# Patient Record
Sex: FEMALE | Race: WHITE | Employment: OTHER | URBAN - METROPOLITAN AREA
[De-identification: names, ages, dates, MRNs, and addresses within clinical notes are randomized per-mention and may not be internally consistent; named-entity substitution may affect disease eponyms.]

---

## 2022-05-18 ENCOUNTER — OFFICE VISIT (OUTPATIENT)
Dept: INTERNAL MEDICINE CLINIC | Facility: CLINIC | Age: 82
End: 2022-05-18
Payer: MEDICARE

## 2022-05-18 VITALS
HEART RATE: 58 BPM | SYSTOLIC BLOOD PRESSURE: 112 MMHG | BODY MASS INDEX: 32.1 KG/M2 | HEIGHT: 61 IN | WEIGHT: 170 LBS | TEMPERATURE: 97 F | DIASTOLIC BLOOD PRESSURE: 56 MMHG

## 2022-05-18 DIAGNOSIS — R01.1 MURMUR: ICD-10-CM

## 2022-05-18 DIAGNOSIS — I10 PRIMARY HYPERTENSION: Primary | ICD-10-CM

## 2022-05-18 DIAGNOSIS — Z12.31 ENCOUNTER FOR SCREENING MAMMOGRAM FOR MALIGNANT NEOPLASM OF BREAST: ICD-10-CM

## 2022-05-18 DIAGNOSIS — H93.13 TINNITUS OF BOTH EARS: ICD-10-CM

## 2022-05-18 DIAGNOSIS — E78.5 DYSLIPIDEMIA: ICD-10-CM

## 2022-05-18 DIAGNOSIS — H90.0 CONDUCTIVE HEARING LOSS, BILATERAL: ICD-10-CM

## 2022-05-18 DIAGNOSIS — F32.A ANXIETY AND DEPRESSION: ICD-10-CM

## 2022-05-18 DIAGNOSIS — F41.9 ANXIETY AND DEPRESSION: ICD-10-CM

## 2022-05-18 DIAGNOSIS — E04.1 THYROID NODULE: ICD-10-CM

## 2022-05-18 DIAGNOSIS — Z78.0 POSTMENOPAUSAL: ICD-10-CM

## 2022-05-18 DIAGNOSIS — G56.01 CARPAL TUNNEL SYNDROME OF RIGHT WRIST: ICD-10-CM

## 2022-05-18 DIAGNOSIS — M79.89 LEG SWELLING: ICD-10-CM

## 2022-05-18 DIAGNOSIS — Z87.898 HISTORY OF PALPITATIONS: ICD-10-CM

## 2022-05-18 PROCEDURE — 99204 OFFICE O/P NEW MOD 45 MIN: CPT | Performed by: NURSE PRACTITIONER

## 2022-05-18 RX ORDER — NADOLOL 40 MG/1
40 TABLET ORAL DAILY
COMMUNITY
Start: 2022-04-27

## 2022-05-18 RX ORDER — ALPRAZOLAM 0.5 MG/1
0.5 TABLET ORAL 3 TIMES DAILY PRN
COMMUNITY
Start: 2022-04-27 | End: 2022-05-18

## 2022-05-18 RX ORDER — SIMVASTATIN 20 MG
20 TABLET ORAL EVERY EVENING
COMMUNITY
Start: 2022-02-03

## 2022-05-18 RX ORDER — SERTRALINE HYDROCHLORIDE 100 MG/1
100 TABLET, FILM COATED ORAL DAILY
COMMUNITY
Start: 2022-02-04

## 2022-05-18 RX ORDER — ALPRAZOLAM 0.5 MG/1
0.5 TABLET ORAL 2 TIMES DAILY PRN
Refills: 0 | COMMUNITY
Start: 2022-05-18

## 2022-05-18 RX ORDER — FUROSEMIDE 40 MG/1
40 TABLET ORAL DAILY PRN
COMMUNITY
Start: 2022-03-01

## 2022-05-18 RX ORDER — AMLODIPINE BESYLATE AND BENAZEPRIL HYDROCHLORIDE 5; 10 MG/1; MG/1
1 CAPSULE ORAL DAILY
COMMUNITY
Start: 2022-02-04

## 2022-05-23 ENCOUNTER — LAB ENCOUNTER (OUTPATIENT)
Dept: LAB | Age: 82
End: 2022-05-23
Attending: NURSE PRACTITIONER
Payer: MEDICARE

## 2022-05-23 DIAGNOSIS — R73.9 HYPERGLYCEMIA: Primary | ICD-10-CM

## 2022-05-23 DIAGNOSIS — F41.9 ANXIETY AND DEPRESSION: ICD-10-CM

## 2022-05-23 DIAGNOSIS — R73.9 HYPERGLYCEMIA: ICD-10-CM

## 2022-05-23 DIAGNOSIS — E78.5 DYSLIPIDEMIA: ICD-10-CM

## 2022-05-23 DIAGNOSIS — I10 PRIMARY HYPERTENSION: ICD-10-CM

## 2022-05-23 DIAGNOSIS — F32.A ANXIETY AND DEPRESSION: ICD-10-CM

## 2022-05-23 LAB
ALBUMIN SERPL-MCNC: 3.5 G/DL (ref 3.4–5)
ALBUMIN/GLOB SERPL: 1 {RATIO} (ref 1–2)
ALP LIVER SERPL-CCNC: 64 U/L
ALT SERPL-CCNC: 23 U/L
ANION GAP SERPL CALC-SCNC: 7 MMOL/L (ref 0–18)
AST SERPL-CCNC: 17 U/L (ref 15–37)
BASOPHILS # BLD AUTO: 0.04 X10(3) UL (ref 0–0.2)
BASOPHILS NFR BLD AUTO: 0.6 %
BILIRUB SERPL-MCNC: 0.5 MG/DL (ref 0.1–2)
BUN BLD-MCNC: 12 MG/DL (ref 7–18)
CALCIUM BLD-MCNC: 8.9 MG/DL (ref 8.5–10.1)
CHLORIDE SERPL-SCNC: 107 MMOL/L (ref 98–112)
CHOLEST SERPL-MCNC: 155 MG/DL (ref ?–200)
CO2 SERPL-SCNC: 28 MMOL/L (ref 21–32)
CREAT BLD-MCNC: 0.81 MG/DL
EOSINOPHIL # BLD AUTO: 0.17 X10(3) UL (ref 0–0.7)
EOSINOPHIL NFR BLD AUTO: 2.7 %
ERYTHROCYTE [DISTWIDTH] IN BLOOD BY AUTOMATED COUNT: 13.2 %
EST. AVERAGE GLUCOSE BLD GHB EST-MCNC: 131 MG/DL (ref 68–126)
FASTING PATIENT LIPID ANSWER: YES
FASTING STATUS PATIENT QL REPORTED: YES
GLOBULIN PLAS-MCNC: 3.6 G/DL (ref 2.8–4.4)
GLUCOSE BLD-MCNC: 117 MG/DL (ref 70–99)
HBA1C MFR BLD: 6.2 % (ref ?–5.7)
HCT VFR BLD AUTO: 45.2 %
HDLC SERPL-MCNC: 57 MG/DL (ref 40–59)
HGB BLD-MCNC: 14.2 G/DL
IMM GRANULOCYTES # BLD AUTO: 0.02 X10(3) UL (ref 0–1)
IMM GRANULOCYTES NFR BLD: 0.3 %
LDLC SERPL CALC-MCNC: 79 MG/DL (ref ?–100)
LYMPHOCYTES # BLD AUTO: 1.42 X10(3) UL (ref 1–4)
LYMPHOCYTES NFR BLD AUTO: 22.6 %
MCH RBC QN AUTO: 28.6 PG (ref 26–34)
MCHC RBC AUTO-ENTMCNC: 31.4 G/DL (ref 31–37)
MCV RBC AUTO: 91.1 FL
MONOCYTES # BLD AUTO: 0.51 X10(3) UL (ref 0.1–1)
MONOCYTES NFR BLD AUTO: 8.1 %
NEUTROPHILS # BLD AUTO: 4.11 X10 (3) UL (ref 1.5–7.7)
NEUTROPHILS # BLD AUTO: 4.11 X10(3) UL (ref 1.5–7.7)
NEUTROPHILS NFR BLD AUTO: 65.7 %
NONHDLC SERPL-MCNC: 98 MG/DL (ref ?–130)
OSMOLALITY SERPL CALC.SUM OF ELEC: 295 MOSM/KG (ref 275–295)
PLATELET # BLD AUTO: 216 10(3)UL (ref 150–450)
POTASSIUM SERPL-SCNC: 3.9 MMOL/L (ref 3.5–5.1)
PROT SERPL-MCNC: 7.1 G/DL (ref 6.4–8.2)
RBC # BLD AUTO: 4.96 X10(6)UL
SODIUM SERPL-SCNC: 142 MMOL/L (ref 136–145)
TRIGL SERPL-MCNC: 106 MG/DL (ref 30–149)
TSI SER-ACNC: 1.56 MIU/ML (ref 0.36–3.74)
VLDLC SERPL CALC-MCNC: 17 MG/DL (ref 0–30)
WBC # BLD AUTO: 6.3 X10(3) UL (ref 4–11)

## 2022-05-23 PROCEDURE — 80061 LIPID PANEL: CPT

## 2022-05-23 PROCEDURE — 80053 COMPREHEN METABOLIC PANEL: CPT

## 2022-05-23 PROCEDURE — 85025 COMPLETE CBC W/AUTO DIFF WBC: CPT

## 2022-05-23 PROCEDURE — 84443 ASSAY THYROID STIM HORMONE: CPT

## 2022-05-23 PROCEDURE — 36415 COLL VENOUS BLD VENIPUNCTURE: CPT

## 2022-05-23 PROCEDURE — 83036 HEMOGLOBIN GLYCOSYLATED A1C: CPT

## 2022-05-25 ENCOUNTER — TELEPHONE (OUTPATIENT)
Dept: INTERNAL MEDICINE CLINIC | Facility: CLINIC | Age: 82
End: 2022-05-25

## 2022-05-25 DIAGNOSIS — R20.2 PARESTHESIAS: Primary | ICD-10-CM

## 2022-05-25 NOTE — TELEPHONE ENCOUNTER
Pts grand daughter Pilar Millan called (not on HIPPA but pt gave verbal OK to speak with her) and stated that pts Rt hand finger tips fell lie they are burning and its painful all the way to her elbow. Cant use hand to hold coffee or put hearing aid in. Pt was up all night due to the pain.      Took tylenol didn't help     Please advise

## 2022-05-25 NOTE — TELEPHONE ENCOUNTER
LOV 5/18/22    \"Carpal tunnel syndrome  Will try bracing. Would like to trial NSIDs but need labs first  She will have tomorrow am.\"    Reports pain to right forearm, fingertips burning/tingling, and  getting weaker. Brace not helping. Initial onset 1 month ago. Granddaughter states SD told pt to call if worse and we would give her a referral. They are requesting ortho. Aware will discuss with SD for recommendations and call back. Not interested in visit here.

## 2022-05-27 ENCOUNTER — OFFICE VISIT (OUTPATIENT)
Dept: ORTHOPEDICS CLINIC | Facility: CLINIC | Age: 82
End: 2022-05-27
Payer: MEDICARE

## 2022-05-27 VITALS — BODY MASS INDEX: 32.1 KG/M2 | HEIGHT: 61 IN | WEIGHT: 170 LBS

## 2022-05-27 DIAGNOSIS — G56.01 CARPAL TUNNEL SYNDROME OF RIGHT WRIST: Primary | ICD-10-CM

## 2022-05-27 PROCEDURE — 99203 OFFICE O/P NEW LOW 30 MIN: CPT | Performed by: ORTHOPAEDIC SURGERY

## 2022-06-01 NOTE — TELEPHONE ENCOUNTER
Which pharmacy:[facility]     Prescription Refill Request - Patient advised can take 48-72 hours. Name of Medication (strength, dose, qty requested:       sertraline 100 MG Oral Tab   2/4/2022     Sig - Route: Take 100 mg by mouth daily. - Oral    Class: Historical        amLODIPine Besy-Benazepril HCl 5-10 MG Oral Cap   2/4/2022     Sig - Route:  Take 1 capsule by mouth daily. - Oral

## 2022-06-02 RX ORDER — AMLODIPINE BESYLATE AND BENAZEPRIL HYDROCHLORIDE 5; 10 MG/1; MG/1
1 CAPSULE ORAL DAILY
Qty: 90 CAPSULE | Refills: 0 | Status: SHIPPED | OUTPATIENT
Start: 2022-06-02

## 2022-06-02 RX ORDER — SERTRALINE HYDROCHLORIDE 100 MG/1
100 TABLET, FILM COATED ORAL DAILY
Qty: 90 TABLET | Refills: 0 | Status: SHIPPED | OUTPATIENT
Start: 2022-06-02

## 2022-06-02 NOTE — TELEPHONE ENCOUNTER
Last VISIT 05/18/22     Last CPE Not on file    Last REFILL Not on file     Last LABS 05/23/22 CPE labs done    Future Appointments   Date Time Provider Cornelio Nelson                 6/16/2022 10:20 AM JENNIFER Seo [facility]         Per PROTOCOL? Not on protocol      Please Approve or Deny.

## 2022-06-08 ENCOUNTER — PROCEDURE VISIT (OUTPATIENT)
Dept: PHYSICAL MEDICINE AND REHAB | Facility: CLINIC | Age: 82
End: 2022-06-08
Payer: MEDICARE

## 2022-06-08 DIAGNOSIS — R20.2 NUMBNESS AND TINGLING IN RIGHT HAND: ICD-10-CM

## 2022-06-08 DIAGNOSIS — R20.0 NUMBNESS AND TINGLING IN RIGHT HAND: ICD-10-CM

## 2022-06-09 ENCOUNTER — OFFICE VISIT (OUTPATIENT)
Dept: ORTHOPEDICS CLINIC | Facility: CLINIC | Age: 82
End: 2022-06-09
Payer: MEDICARE

## 2022-06-09 ENCOUNTER — TELEPHONE (OUTPATIENT)
Dept: ORTHOPEDICS CLINIC | Facility: CLINIC | Age: 82
End: 2022-06-09

## 2022-06-09 VITALS — BODY MASS INDEX: 30.36 KG/M2 | WEIGHT: 165 LBS | HEIGHT: 62 IN

## 2022-06-09 DIAGNOSIS — G56.01 CARPAL TUNNEL SYNDROME ON RIGHT: Primary | ICD-10-CM

## 2022-06-09 DIAGNOSIS — G56.01 CARPAL TUNNEL SYNDROME OF RIGHT WRIST: Primary | ICD-10-CM

## 2022-06-09 PROCEDURE — 99214 OFFICE O/P EST MOD 30 MIN: CPT | Performed by: ORTHOPAEDIC SURGERY

## 2022-06-09 NOTE — TELEPHONE ENCOUNTER
Surgeon: Dr. Dany Hernandez    Date of Surgery: 6/17       Post Op Appt: 6/30 @ 1140AM    Prior Authorization:   Inpatient or Outpatient: Outpatient  Facility: [facility]  Work Comp: NO  CPT: 04646  DX: G56.01    Surgical Assistant: Regino Quinonez     Preadmission Testing: PER ANESTHESIA GUIDELINES     Pre-Op Clearance Requested: HISTORY AND PHYSICAL and NONE    DME:  NONE NEEDED    Rehab Services Ordered: NONE     Disability Paperwork: NONE    On [place] Calendar: YES    Notes: [facility]

## 2022-06-09 NOTE — PROGRESS NOTES
OR BOOKING SHEET   Nerve Decompression/Repair  Name: Estela Viera  MRN: [id number]   : 1940  Diagnosis:  [x] Carpal tunnel syndrome of right wrist [G56.01]    Disposition:    [x] Outpatient  [] Overnight for BLUE  [] Overnight for observation and pain control  [] Inpatient procedure    Operative Time:    [] 45 min     [x] 1 hr     []  1.5 hr     [] 2 hr     []  2.5 hr      [] 3 hr      Antibiotics:   [x]  Ancef   []  Clindamycin     Laterality:   [x] RIGHT [] LEFT        [] BILATERAL    Surgical Consent:  Right carpal tunnel release    Procedure Codes:  [] Endoscopic Carpal Tunnel Release (41965)    [] Cubital Tunnel Release (06438)    [x] Open Carpal Tunnel Release (47873)  [] Guyon's Canal Release (90351)  [] Digital Nerve Repair, one nerve (07964)  [] Digital Nerve Repair, additional nerve (72652)  [] Use of Operative Microscope (16973)    Anesthesia Type:  [] General  [] Regional  [] Brett Block  [] Monitored Anesthesia Care  [] Local Only (1% lidocaine, 0.5% marcaine - No Epi - 1:1 mix)  [x] Local Only (1% lidocaine WITH epi, 0.5% marcaine - 1:1 mix)    Additional info:   [] PCP Clearance Needed  [] Cardiac Clearance    Equipment:  []  Local (1% lidocaine, 0.5% marcaine - No Epi - 1:1 mix)  [] Local (1% lidocaine WITH epi, 0.5% marcaine - 1:1 mix)  []  Microaire Endoscopic System  [x]  Lead Hand  []  Sterile Tourniquet  [] Operative Microscope    Positioning:   []  Supine with arm table on OR Table  [x]  Supine with arm table on transport cart    Assistant request:   [x]  Yes - Chen Rice  []  No    Anesthesiologist Request:   None

## 2022-06-17 ENCOUNTER — HOSPITAL ENCOUNTER (OUTPATIENT)
Facility: HOSPITAL | Age: 82
Setting detail: HOSPITAL OUTPATIENT SURGERY
Discharge: HOME | End: 2022-06-17
Attending: ORTHOPAEDIC SURGERY | Admitting: ORTHOPAEDIC SURGERY
Payer: MEDICARE

## 2022-06-17 VITALS
SYSTOLIC BLOOD PRESSURE: 149 MMHG | OXYGEN SATURATION: 95 % | DIASTOLIC BLOOD PRESSURE: 67 MMHG | RESPIRATION RATE: 16 BRPM | HEIGHT: 62 IN | TEMPERATURE: 97 F | WEIGHT: 167.56 LBS | HEART RATE: 51 BPM | BODY MASS INDEX: 30.83 KG/M2

## 2022-06-17 DIAGNOSIS — G56.01 CARPAL TUNNEL SYNDROME ON RIGHT: ICD-10-CM

## 2022-06-17 PROCEDURE — 01N50ZZ RELEASE MEDIAN NERVE, OPEN APPROACH: ICD-10-PCS | Performed by: ORTHOPAEDIC SURGERY

## 2022-06-17 RX ORDER — CEFAZOLIN SODIUM/WATER 2 G/20 ML
2 SYRINGE (ML) INTRAVENOUS ONCE
Status: DISCONTINUED | OUTPATIENT
Start: 2022-06-17 | End: 2022-06-17 | Stop reason: HOSPADM

## 2022-06-17 RX ORDER — HYDROCODONE BITARTRATE AND ACETAMINOPHEN 5; 325 MG/1; MG/1
1 TABLET ORAL EVERY 6 HOURS PRN
Qty: 5 TABLET | Refills: 0 | Status: SHIPPED | OUTPATIENT
Start: 2022-06-17

## 2022-06-17 RX ORDER — LIDOCAINE HYDROCHLORIDE 10 MG/ML
INJECTION, SOLUTION INFILTRATION; PERINEURAL AS NEEDED
Status: DISCONTINUED | OUTPATIENT
Start: 2022-06-17 | End: 2022-06-17 | Stop reason: HOSPADM

## 2022-06-17 RX ORDER — BUPIVACAINE HYDROCHLORIDE 5 MG/ML
INJECTION, SOLUTION EPIDURAL; INTRACAUDAL AS NEEDED
Status: DISCONTINUED | OUTPATIENT
Start: 2022-06-17 | End: 2022-06-17 | Stop reason: HOSPADM

## 2022-06-17 NOTE — INTERVAL H&P NOTE
Pre-op Diagnosis: Carpal tunnel syndrome on right [G56.01]    The above referenced H&P was reviewed by CUCO Malave on 6/17/2022, the patient was examined and no significant changes have occurred in the patient's condition since the H&P was performed. I discussed with the patient and/or legal representative the potential benefits, risks and side effects of this procedure; the likelihood of the patient achieving goals; and potential problems that might occur during recuperation. I discussed reasonable alternatives to the procedure, including risks, benefits and side effects related to the alternatives and risks related to not receiving this procedure. We will proceed with procedure as planned.

## 2022-06-17 NOTE — OPERATIVE REPORT
Operative Note    Patient Name: Nohemy Last    Preoperative Diagnosis:     1. Carpal tunnel syndrome on right [G56.01]    Postoperative Diagnosis:     1. Carpal tunnel syndrome on right [G56.01]    Surgeon(s) and Role:     Marla Antoine MD - Primary     Assistant: PA: CUCO Norman     A PA was needed for the successful completion of this case. She was essential for the proper positioning of patient, manipulation of instruments, proper exposure, manipulation of soft tissue, and wound closure. Procedures:     1. Right carpal tunnel release (67987)    Antibiotics: Ancef 2g    Surgical Findings: thickened TCL    Anesthesia: Local    Complications: None    Implants: None    Specimen: None    Condition: Stable    Estimated Blood Loss: 1mL    Indications:  80year old female with EMG/NCV confirmed carpal tunnel syndrome. The risks associated with the procedure, expected  outcome, the expected time to recovery, and the rehab required were reviewed. Any patient's questions were answered. Procedure:  Patient was met in the preoperative holding area where consent was verified, laterality was marked with the surgeon's initials, and the H&P was updated. Patient was brought to the operating room on a transport cart. Patient was placed in supine position. SCDs were placed. An upper arm tourniquet was placed. The extremity was prepped and draped in usual sterile fashion. A surgical timeout was performed. Local (1% lidocaine with epi, 0.5% Marcaine) was infiltrated in the distal forearm and along the incision site of the carpal tunnel release. The limb was elevated and exsanguinated using Esmarch. Tourniquet was raised to 250 mmHg. A 15 blade was used to make an incision along the radial border of the ring finger with the distal extent being the hook of the hamate and the proximal extent being the pisiform. 15 blade was used to make an incision through the dermis.   A Ray-Catalina was used to perform blunt dissection onto the palmar fascia. Palmar fascia was incised longitudinally. Blunt dissection was once again used to identify the transverse carpal ligament which was then divided longitudinally along the entirety of its length. A tight carpal tunnel was noted. The soft tissue was dissected off the proximal edge of transverse carpal ligament and antebrachial fascia superficial and deep to it using RAFAL Rodríguez. Lance scissors was used to perform a push cut to release at. The wound was irrigated with sterile normal saline. Tourniquet was let down and bipolar cautery was used to achieve hemostasis. Closure: The incision was then closed with 4-0 nylon in a horizontal mattress fashion. Dressing/Splint:  Bacitracin, 4 x 4 gauze, and a loosely wrapped Ace wrap was used to hold the dressing in place. Post Operative:  Patient was woken up from anesthesia and taken to PACU for further recovery and discharge home. Nisreen Nogueira MD  Hand, Wrist, & Elbow Surgery  [facility]  [place] [address], [address]  [email or web address]  t: [id number]  f: [phone]

## 2022-06-30 ENCOUNTER — OFFICE VISIT (OUTPATIENT)
Dept: ORTHOPEDICS CLINIC | Facility: CLINIC | Age: 82
End: 2022-06-30
Payer: MEDICARE

## 2022-06-30 VITALS — BODY MASS INDEX: 30.73 KG/M2 | HEIGHT: 62 IN | WEIGHT: 167 LBS

## 2022-06-30 DIAGNOSIS — G56.01 CARPAL TUNNEL SYNDROME ON RIGHT: Primary | ICD-10-CM

## 2022-06-30 PROCEDURE — 99024 POSTOP FOLLOW-UP VISIT: CPT | Performed by: PHYSICIAN ASSISTANT

## 2022-07-15 RX ORDER — ALPRAZOLAM 0.5 MG/1
0.5 TABLET ORAL DAILY PRN
Qty: 30 TABLET | Refills: 0 | Status: SHIPPED | OUTPATIENT
Start: 2022-07-15

## 2022-07-15 NOTE — TELEPHONE ENCOUNTER
Last OV: 5/18/22 new pt est care    Future Appointments   Date Time Provider Cornelio Nelson   7/28/2022 10:15 AM [facility]   7/28/2022 10:45 AM Zan Sawant MD [facility]   8/4/2022 10:00 AM Clerance Dancer, MD [facility]        Latest labs: 5/23/22 a1C, TSH, Lipid, CMP and CBC    Latest RX: ALPRAZolam 0.5 MG Oral Tab no hx on historical reports    Per protocol, not on protocol. Rx pending.

## 2022-08-04 ENCOUNTER — OFFICE VISIT (OUTPATIENT)
Dept: INTERNAL MEDICINE CLINIC | Facility: CLINIC | Age: 82
End: 2022-08-04
Payer: MEDICARE

## 2022-08-04 VITALS
TEMPERATURE: 98 F | WEIGHT: 168 LBS | RESPIRATION RATE: 18 BRPM | HEIGHT: 61.81 IN | SYSTOLIC BLOOD PRESSURE: 126 MMHG | DIASTOLIC BLOOD PRESSURE: 74 MMHG | BODY MASS INDEX: 30.91 KG/M2 | HEART RATE: 68 BPM | OXYGEN SATURATION: 99 %

## 2022-08-04 DIAGNOSIS — M25.561 CHRONIC PAIN OF RIGHT KNEE: ICD-10-CM

## 2022-08-04 DIAGNOSIS — E04.1 THYROID NODULE: ICD-10-CM

## 2022-08-04 DIAGNOSIS — E78.5 DYSLIPIDEMIA: ICD-10-CM

## 2022-08-04 DIAGNOSIS — R01.1 MURMUR: ICD-10-CM

## 2022-08-04 DIAGNOSIS — G89.29 CHRONIC PAIN OF RIGHT KNEE: ICD-10-CM

## 2022-08-04 DIAGNOSIS — G56.01 CARPAL TUNNEL SYNDROME OF RIGHT WRIST: ICD-10-CM

## 2022-08-04 DIAGNOSIS — H90.0 CONDUCTIVE HEARING LOSS, BILATERAL: ICD-10-CM

## 2022-08-04 DIAGNOSIS — H93.13 TINNITUS OF BOTH EARS: ICD-10-CM

## 2022-08-04 DIAGNOSIS — I10 PRIMARY HYPERTENSION: ICD-10-CM

## 2022-08-04 DIAGNOSIS — Z00.00 ENCOUNTER FOR ANNUAL HEALTH EXAMINATION: Primary | ICD-10-CM

## 2022-08-04 DIAGNOSIS — R73.03 PREDIABETES: ICD-10-CM

## 2022-08-04 DIAGNOSIS — F32.A ANXIETY AND DEPRESSION: ICD-10-CM

## 2022-08-04 DIAGNOSIS — F41.9 ANXIETY AND DEPRESSION: ICD-10-CM

## 2022-08-04 PROCEDURE — G0438 PPPS, INITIAL VISIT: HCPCS | Performed by: INTERNAL MEDICINE

## 2022-08-04 PROCEDURE — 99214 OFFICE O/P EST MOD 30 MIN: CPT | Performed by: INTERNAL MEDICINE

## 2022-08-04 RX ORDER — ALPRAZOLAM 0.5 MG/1
0.5 TABLET ORAL 2 TIMES DAILY PRN
Qty: 60 TABLET | Refills: 0 | Status: SHIPPED | OUTPATIENT
Start: 2022-08-04

## 2022-08-05 ENCOUNTER — TELEPHONE (OUTPATIENT)
Dept: INTERNAL MEDICINE CLINIC | Facility: CLINIC | Age: 82
End: 2022-08-05

## 2022-08-05 NOTE — TELEPHONE ENCOUNTER
This was intentional. The last script sent was a temporary reduction. We are working to reduce this regimen.

## 2022-08-05 NOTE — TELEPHONE ENCOUNTER
Pt is calling to inform us pharmacy will not fill this medication until after the 15th of this month. Too soon to fill. Calling to see how we can help her. ALPRAZolam 0.5 MG Oral Tab 60 tablet 0 8/4/2022     Sig - Route:  Take 1 tablet (0.5 mg total) by mouth 2 (two) times daily as needed for Sleep or Anxiety. - Oral    Sent to pharmacy as: ALPRAZolam 0.5 MG Oral Tablet (Xanax)        Pt also stated she is no longer taking the Hydrocodone

## 2022-08-05 NOTE — TELEPHONE ENCOUNTER
CMA called pharmacy and discussed with tech. Per tech, they are in process of filling it and issue has been resolved on pharmacy end. Pt will get a call once available. CMA called and informed pt.

## 2022-08-08 ENCOUNTER — TELEPHONE (OUTPATIENT)
Dept: ORTHOPEDICS CLINIC | Facility: CLINIC | Age: 82
End: 2022-08-08

## 2022-08-08 DIAGNOSIS — M25.561 RIGHT KNEE PAIN, UNSPECIFIED CHRONICITY: ICD-10-CM

## 2022-08-08 DIAGNOSIS — M25.562 LEFT KNEE PAIN, UNSPECIFIED CHRONICITY: Primary | ICD-10-CM

## 2022-08-17 NOTE — TELEPHONE ENCOUNTER
Patient is coming in for Raman knee pain. Patient has not had any imaging done. Please place X-ray order accordingly. Patient has been notified to come in earlier prior to appointment. Thank you.     Future Appointments   Date Time Provider Cornelio Leslie   9/14/2022  4:00 PM Cody Saleh MD [facility]
Reviewed patients chart, xray orders are required. Order placed for buddy knee xrays.  Please contact patient advise to arrive 30 mins prior to patients appt to complete x-ray order and schedule patients xray appt-Thank you    Future Appointments   Date Time Provider Cornelio Leslie   9/14/2022  4:00 PM Deann Gardner MD [facility]
Simple: Patient demonstrates quick and easy understanding/Verbalized Understanding

## 2022-08-29 ENCOUNTER — WALK IN (OUTPATIENT)
Dept: URGENT CARE | Age: 82
End: 2022-08-29

## 2022-08-29 VITALS
SYSTOLIC BLOOD PRESSURE: 106 MMHG | WEIGHT: 170 LBS | RESPIRATION RATE: 20 BRPM | HEIGHT: 62 IN | TEMPERATURE: 97.7 F | OXYGEN SATURATION: 98 % | DIASTOLIC BLOOD PRESSURE: 70 MMHG | HEART RATE: 64 BPM | BODY MASS INDEX: 31.28 KG/M2

## 2022-08-29 DIAGNOSIS — J06.9 ACUTE UPPER RESPIRATORY INFECTION: Primary | ICD-10-CM

## 2022-08-29 DIAGNOSIS — R06.89 ADVENTITIOUS BREATH SOUNDS: ICD-10-CM

## 2022-08-29 DIAGNOSIS — H10.9 BACTERIAL CONJUNCTIVITIS: ICD-10-CM

## 2022-08-29 PROCEDURE — U0003 INFECTIOUS AGENT DETECTION BY NUCLEIC ACID (DNA OR RNA); SEVERE ACUTE RESPIRATORY SYNDROME CORONAVIRUS 2 (SARS-COV-2) (CORONAVIRUS DISEASE [COVID-19]), AMPLIFIED PROBE TECHNIQUE, MAKING USE OF HIGH THROUGHPUT TECHNOLOGIES AS DESCRIBED BY CMS-2020-01-R: HCPCS | Performed by: CLINICAL MEDICAL LABORATORY

## 2022-08-29 PROCEDURE — U0005 INFEC AGEN DETEC AMPLI PROBE: HCPCS | Performed by: CLINICAL MEDICAL LABORATORY

## 2022-08-29 PROCEDURE — 99203 OFFICE O/P NEW LOW 30 MIN: CPT | Performed by: NURSE PRACTITIONER

## 2022-08-29 RX ORDER — AMLODIPINE BESYLATE AND BENAZEPRIL HYDROCHLORIDE 5; 10 MG/1; MG/1
CAPSULE ORAL
COMMUNITY
Start: 2022-06-03

## 2022-08-29 RX ORDER — POLYMYXIN B SULFATE AND TRIMETHOPRIM 1; 10000 MG/ML; [USP'U]/ML
1 SOLUTION OPHTHALMIC EVERY 4 HOURS
Qty: 10 ML | Refills: 0 | Status: SHIPPED | OUTPATIENT
Start: 2022-08-29 | End: 2022-09-05

## 2022-08-29 RX ORDER — AZITHROMYCIN 250 MG/1
250 TABLET, FILM COATED ORAL DAILY
Qty: 6 TABLET | Refills: 0 | Status: SHIPPED | OUTPATIENT
Start: 2022-08-29 | End: 2022-09-03

## 2022-08-29 RX ORDER — SERTRALINE HYDROCHLORIDE 100 MG/1
TABLET, FILM COATED ORAL
COMMUNITY
Start: 2022-06-03

## 2022-08-29 RX ORDER — NADOLOL 40 MG/1
40 TABLET ORAL DAILY
COMMUNITY
Start: 2022-07-14

## 2022-08-29 RX ORDER — SIMVASTATIN 20 MG
20 TABLET ORAL DAILY
COMMUNITY
Start: 2022-07-14

## 2022-08-29 RX ORDER — ALPRAZOLAM 0.5 MG/1
TABLET ORAL
COMMUNITY
Start: 2022-08-05 | End: 2023-04-11 | Stop reason: DRUGHIGH

## 2022-08-29 RX ORDER — BENZONATATE 100 MG/1
CAPSULE ORAL
Qty: 30 CAPSULE | Refills: 0 | Status: SHIPPED | OUTPATIENT
Start: 2022-08-29 | End: 2023-04-11 | Stop reason: SDUPTHER

## 2022-08-29 RX ORDER — FUROSEMIDE 40 MG/1
40 TABLET ORAL DAILY
COMMUNITY
Start: 2022-07-14

## 2022-08-30 LAB
SARS-COV-2 RNA RESP QL NAA+PROBE: NOT DETECTED
SERVICE CMNT-IMP: NORMAL
SERVICE CMNT-IMP: NORMAL

## 2022-08-30 RX ORDER — ALPRAZOLAM 0.25 MG/1
0.25 TABLET ORAL 2 TIMES DAILY PRN
Qty: 60 TABLET | Refills: 0 | Status: SHIPPED | OUTPATIENT
Start: 2022-08-30 | End: 2022-09-09

## 2022-09-01 RX ORDER — ALPRAZOLAM 0.25 MG/1
0.25 TABLET ORAL 2 TIMES DAILY PRN
Qty: 60 TABLET | Refills: 0 | OUTPATIENT
Start: 2022-09-01

## 2022-10-10 ENCOUNTER — PATIENT MESSAGE (OUTPATIENT)
Dept: INTERNAL MEDICINE CLINIC | Facility: CLINIC | Age: 82
End: 2022-10-10

## 2022-10-10 RX ORDER — ALPRAZOLAM 0.25 MG/1
0.25 TABLET ORAL 2 TIMES DAILY PRN
Qty: 60 TABLET | Refills: 0 | Status: SHIPPED | OUTPATIENT
Start: 2022-10-10

## 2022-10-10 NOTE — TELEPHONE ENCOUNTER
CMA called and discussed with pt. ARELI then called Susanne Poole to ensure no meds were on hold at the pharmacy, which they are not. Pending new order of Alprazolam ( Last fill date 09/09/22 qty 60 w/0 refills) Please review and approve or deny.

## 2022-11-07 RX ORDER — ALPRAZOLAM 0.25 MG/1
TABLET ORAL
Qty: 60 TABLET | Refills: 0 | Status: SHIPPED | OUTPATIENT
Start: 2022-11-07

## 2022-11-08 ENCOUNTER — OFFICE VISIT (OUTPATIENT)
Dept: INTERNAL MEDICINE CLINIC | Facility: CLINIC | Age: 82
End: 2022-11-08
Payer: MEDICARE

## 2022-11-08 VITALS
WEIGHT: 161 LBS | DIASTOLIC BLOOD PRESSURE: 74 MMHG | RESPIRATION RATE: 12 BRPM | OXYGEN SATURATION: 97 % | SYSTOLIC BLOOD PRESSURE: 136 MMHG | HEIGHT: 62 IN | HEART RATE: 78 BPM | BODY MASS INDEX: 29.63 KG/M2

## 2022-11-08 DIAGNOSIS — R10.31 RLQ ABDOMINAL PAIN: Primary | ICD-10-CM

## 2022-11-08 PROCEDURE — 99213 OFFICE O/P EST LOW 20 MIN: CPT | Performed by: INTERNAL MEDICINE

## 2022-11-19 ENCOUNTER — HOSPITAL ENCOUNTER (OUTPATIENT)
Dept: ULTRASOUND IMAGING | Age: 82
Discharge: HOME | End: 2022-11-19
Attending: INTERNAL MEDICINE
Payer: MEDICARE

## 2022-11-19 ENCOUNTER — LAB ENCOUNTER (OUTPATIENT)
Dept: LAB | Age: 82
End: 2022-11-19
Attending: INTERNAL MEDICINE
Payer: MEDICARE

## 2022-11-19 DIAGNOSIS — R10.31 RLQ ABDOMINAL PAIN: ICD-10-CM

## 2022-11-19 LAB
ALBUMIN SERPL-MCNC: 3.9 G/DL (ref 3.4–5)
ALBUMIN/GLOB SERPL: 1 {RATIO} (ref 1–2)
ALP LIVER SERPL-CCNC: 67 U/L
ALT SERPL-CCNC: 24 U/L
ANION GAP SERPL CALC-SCNC: 5 MMOL/L (ref 0–18)
AST SERPL-CCNC: 23 U/L (ref 15–37)
BASOPHILS # BLD AUTO: 0.05 X10(3) UL (ref 0–0.2)
BASOPHILS NFR BLD AUTO: 0.7 %
BILIRUB SERPL-MCNC: 0.7 MG/DL (ref 0.1–2)
BILIRUB UR QL STRIP.AUTO: NEGATIVE
BUN BLD-MCNC: 15 MG/DL (ref 7–18)
CALCIUM BLD-MCNC: 9.2 MG/DL (ref 8.5–10.1)
CHLORIDE SERPL-SCNC: 103 MMOL/L (ref 98–112)
CLARITY UR REFRACT.AUTO: CLEAR
CO2 SERPL-SCNC: 32 MMOL/L (ref 21–32)
COLOR UR AUTO: YELLOW
CREAT BLD-MCNC: 0.76 MG/DL
EOSINOPHIL # BLD AUTO: 0.12 X10(3) UL (ref 0–0.7)
EOSINOPHIL NFR BLD AUTO: 1.7 %
ERYTHROCYTE [DISTWIDTH] IN BLOOD BY AUTOMATED COUNT: 14.1 %
FASTING STATUS PATIENT QL REPORTED: YES
GFR SERPLBLD BASED ON 1.73 SQ M-ARVRAT: 78 ML/MIN/1.73M2 (ref 60–?)
GLOBULIN PLAS-MCNC: 3.8 G/DL (ref 2.8–4.4)
GLUCOSE BLD-MCNC: 122 MG/DL (ref 70–99)
GLUCOSE UR STRIP.AUTO-MCNC: NEGATIVE MG/DL
HCT VFR BLD AUTO: 47.6 %
HGB BLD-MCNC: 14.9 G/DL
IMM GRANULOCYTES # BLD AUTO: 0.02 X10(3) UL (ref 0–1)
IMM GRANULOCYTES NFR BLD: 0.3 %
KETONES UR STRIP.AUTO-MCNC: NEGATIVE MG/DL
LYMPHOCYTES # BLD AUTO: 1.79 X10(3) UL (ref 1–4)
LYMPHOCYTES NFR BLD AUTO: 24.6 %
MCH RBC QN AUTO: 28.3 PG (ref 26–34)
MCHC RBC AUTO-ENTMCNC: 31.3 G/DL (ref 31–37)
MCV RBC AUTO: 90.5 FL
MONOCYTES # BLD AUTO: 0.71 X10(3) UL (ref 0.1–1)
MONOCYTES NFR BLD AUTO: 9.8 %
NEUTROPHILS # BLD AUTO: 4.58 X10 (3) UL (ref 1.5–7.7)
NEUTROPHILS # BLD AUTO: 4.58 X10(3) UL (ref 1.5–7.7)
NEUTROPHILS NFR BLD AUTO: 62.9 %
NITRITE UR QL STRIP.AUTO: NEGATIVE
OSMOLALITY SERPL CALC.SUM OF ELEC: 292 MOSM/KG (ref 275–295)
PH UR STRIP.AUTO: 6 [PH] (ref 5–8)
PLATELET # BLD AUTO: 241 10(3)UL (ref 150–450)
POTASSIUM SERPL-SCNC: 4.5 MMOL/L (ref 3.5–5.1)
PROT SERPL-MCNC: 7.7 G/DL (ref 6.4–8.2)
PROT UR STRIP.AUTO-MCNC: NEGATIVE MG/DL
RBC # BLD AUTO: 5.26 X10(6)UL
RBC UR QL AUTO: NEGATIVE
SODIUM SERPL-SCNC: 140 MMOL/L (ref 136–145)
SP GR UR STRIP.AUTO: 1.01 (ref 1–1.03)
UROBILINOGEN UR STRIP.AUTO-MCNC: <2 MG/DL
WBC # BLD AUTO: 7.3 X10(3) UL (ref 4–11)

## 2022-11-19 PROCEDURE — 80053 COMPREHEN METABOLIC PANEL: CPT

## 2022-11-19 PROCEDURE — 76700 US EXAM ABDOM COMPLETE: CPT | Performed by: INTERNAL MEDICINE

## 2022-11-19 PROCEDURE — 81001 URINALYSIS AUTO W/SCOPE: CPT

## 2022-11-19 PROCEDURE — 85025 COMPLETE CBC W/AUTO DIFF WBC: CPT

## 2022-11-19 PROCEDURE — 36415 COLL VENOUS BLD VENIPUNCTURE: CPT

## 2022-12-05 RX ORDER — ALPRAZOLAM 0.25 MG/1
0.25 TABLET ORAL 2 TIMES DAILY PRN
Qty: 60 TABLET | Refills: 0 | Status: SHIPPED | OUTPATIENT
Start: 2022-12-05

## 2022-12-05 NOTE — TELEPHONE ENCOUNTER
Which pharmacy:  [facility] [address], [phone], [phone]   [facility]  [place] [address]   Phone: [phone] Fax: [phone]         Prescription Refill Request - Patient advised can take 48-72 hours.       Name of Medication (strength, dose, qty requested:   ALPRAZOLAM 0.25 MG Oral Tab 60 tablet 0 11/7/2022     Sig: TAKE 1 TABLET(0.25 MG) BY MOUTH TWICE DAILY AS NEEDED    Sent to pharmacy as: ALPRAZolam 0.25 MG Oral Tablet (Xanax)

## 2022-12-09 ENCOUNTER — OFFICE VISIT (OUTPATIENT)
Dept: INTERNAL MEDICINE CLINIC | Facility: CLINIC | Age: 82
End: 2022-12-09
Payer: MEDICARE

## 2022-12-09 VITALS
HEIGHT: 62 IN | RESPIRATION RATE: 18 BRPM | DIASTOLIC BLOOD PRESSURE: 64 MMHG | SYSTOLIC BLOOD PRESSURE: 130 MMHG | HEART RATE: 78 BPM | WEIGHT: 160 LBS | OXYGEN SATURATION: 97 % | BODY MASS INDEX: 29.44 KG/M2

## 2022-12-09 DIAGNOSIS — G89.29 CHRONIC GENERALIZED ABDOMINAL PAIN: Primary | ICD-10-CM

## 2022-12-09 DIAGNOSIS — R10.84 CHRONIC GENERALIZED ABDOMINAL PAIN: Primary | ICD-10-CM

## 2022-12-09 PROCEDURE — 87086 URINE CULTURE/COLONY COUNT: CPT | Performed by: INTERNAL MEDICINE

## 2022-12-09 PROCEDURE — 81001 URINALYSIS AUTO W/SCOPE: CPT | Performed by: INTERNAL MEDICINE

## 2022-12-09 PROCEDURE — 99212 OFFICE O/P EST SF 10 MIN: CPT | Performed by: INTERNAL MEDICINE

## 2022-12-10 LAB
BILIRUB UR QL: NEGATIVE
CLARITY UR: CLEAR
COLOR UR: YELLOW
GLUCOSE UR-MCNC: NEGATIVE MG/DL
HGB UR QL STRIP.AUTO: NEGATIVE
KETONES UR-MCNC: NEGATIVE MG/DL
NITRITE UR QL STRIP.AUTO: NEGATIVE
PH UR: 7 [PH] (ref 5–8)
PROT UR-MCNC: NEGATIVE MG/DL
SP GR UR STRIP: 1.01 (ref 1–1.03)
UROBILINOGEN UR STRIP-ACNC: 0.2

## 2022-12-26 ENCOUNTER — HOSPITAL ENCOUNTER (OUTPATIENT)
Dept: CT IMAGING | Age: 82
Discharge: HOME | End: 2022-12-26
Attending: INTERNAL MEDICINE
Payer: MEDICARE

## 2022-12-26 DIAGNOSIS — G89.29 CHRONIC GENERALIZED ABDOMINAL PAIN: ICD-10-CM

## 2022-12-26 DIAGNOSIS — R10.84 CHRONIC GENERALIZED ABDOMINAL PAIN: ICD-10-CM

## 2022-12-26 LAB
CREAT BLD-MCNC: 0.7 MG/DL
GFR SERPLBLD BASED ON 1.73 SQ M-ARVRAT: 86 ML/MIN/1.73M2 (ref 60–?)

## 2022-12-26 PROCEDURE — 82565 ASSAY OF CREATININE: CPT

## 2022-12-26 PROCEDURE — 74177 CT ABD & PELVIS W/CONTRAST: CPT | Performed by: INTERNAL MEDICINE

## 2022-12-26 RX ORDER — IOHEXOL 350 MG/ML
80 INJECTION, SOLUTION INTRAVENOUS
Status: COMPLETED | OUTPATIENT
Start: 2022-12-26 | End: 2022-12-26

## 2022-12-26 RX ADMIN — IOHEXOL 80 ML: 350 INJECTION, SOLUTION INTRAVENOUS at 14:30:00

## 2022-12-27 DIAGNOSIS — D73.89 NODULE OF SPLEEN: Primary | ICD-10-CM

## 2023-01-03 RX ORDER — ALPRAZOLAM 0.25 MG/1
0.25 TABLET ORAL 2 TIMES DAILY PRN
Qty: 60 TABLET | Refills: 0 | OUTPATIENT
Start: 2023-01-03

## 2023-01-03 NOTE — TELEPHONE ENCOUNTER
[facility] STORE [address] AT [facility] [address] [facility], [phone], [phone]    Pt only has one day/2 pills left of the below. Pt requesting several refills on this script. High TE    ALPRAZolam 0.25 MG Oral Tab 60 tablet 0 12/5/2022     Sig - Route: Take 1 tablet (0.25 mg total) by mouth 2 (two) times daily as needed.  - Oral    Sent to pharmacy as: ALPRAZolam 0.25 MG Oral Tablet (Xanax)

## 2023-01-03 NOTE — TELEPHONE ENCOUNTER
Last VISIT 12/09/22    Last CPE 08/04/22    Last REFILL 12/05/22 qty 60 w/0 refills     Last LABS 12/26/22 Creatinine done    No future appointments. Please Approve or Deny.

## 2023-01-18 RX ORDER — ALPRAZOLAM 0.25 MG/1
0.25 TABLET ORAL DAILY PRN
Qty: 30 TABLET | Refills: 0 | Status: SHIPPED | OUTPATIENT
Start: 2023-01-18

## 2023-01-18 NOTE — TELEPHONE ENCOUNTER
Patient is calling and only has a couple of days left.   Patient did admit that she did not realize that the instructions for the last refill changed and she was taking the medication 2X a day instead of the new 1X a day  Would like to have a refill

## 2023-01-19 ENCOUNTER — TELEPHONE (OUTPATIENT)
Dept: INTERNAL MEDICINE CLINIC | Facility: CLINIC | Age: 83
End: 2023-01-19

## 2023-01-19 RX ORDER — ALPRAZOLAM 0.5 MG/1
0.5 TABLET ORAL 2 TIMES DAILY PRN
Qty: 60 TABLET | Refills: 0 | Status: SHIPPED | OUTPATIENT
Start: 2023-01-19

## 2023-01-19 NOTE — TELEPHONE ENCOUNTER
She has been on this reduced dose since September and is only now experiencing insomnia? Is this strictly being taken for sleep? Would like to facilitate her symptoms, however, she has really made progress. Please let me know and I will send today.

## 2023-01-19 NOTE — TELEPHONE ENCOUNTER
1 mg BID PRN ordered. However, if continues to require this dose needs to see a specilaist for further recommendations. Again, in light of the side effect profile of this medication I do not recommend continued use.

## 2023-01-19 NOTE — TELEPHONE ENCOUNTER
Hasn't been sleeping well for a while. She will not answer for how long. States when she doesn't sleep well, she falls asleep in her chair throughout the day and misses her exercise classes, etc.     States is not strictly taking for sleep. She can't clear her mind to get to sleep; states \"I'm a wreck\". She is requesting an increased dose.

## 2023-03-06 ENCOUNTER — OFFICE VISIT (OUTPATIENT)
Dept: ORTHOPEDICS CLINIC | Facility: CLINIC | Age: 83
End: 2023-03-06
Payer: MEDICARE

## 2023-03-06 ENCOUNTER — HOSPITAL ENCOUNTER (OUTPATIENT)
Dept: GENERAL RADIOLOGY | Age: 83
Discharge: HOME | End: 2023-03-06
Attending: ORTHOPAEDIC SURGERY
Payer: MEDICARE

## 2023-03-06 VITALS — HEIGHT: 62 IN | WEIGHT: 188.81 LBS | HEART RATE: 63 BPM | OXYGEN SATURATION: 98 % | BODY MASS INDEX: 34.74 KG/M2

## 2023-03-06 DIAGNOSIS — M25.562 LEFT KNEE PAIN, UNSPECIFIED CHRONICITY: ICD-10-CM

## 2023-03-06 DIAGNOSIS — M25.561 RIGHT KNEE PAIN, UNSPECIFIED CHRONICITY: ICD-10-CM

## 2023-03-06 DIAGNOSIS — M17.11 PRIMARY OSTEOARTHRITIS OF RIGHT KNEE: Primary | ICD-10-CM

## 2023-03-06 PROCEDURE — 73564 X-RAY EXAM KNEE 4 OR MORE: CPT | Performed by: ORTHOPAEDIC SURGERY

## 2023-03-10 ENCOUNTER — TELEPHONE (OUTPATIENT)
Dept: INTERNAL MEDICINE CLINIC | Facility: CLINIC | Age: 83
End: 2023-03-10

## 2023-03-10 ENCOUNTER — TELEPHONE (OUTPATIENT)
Dept: ORTHOPEDICS CLINIC | Facility: CLINIC | Age: 83
End: 2023-03-10

## 2023-03-10 DIAGNOSIS — M17.11 PRIMARY OSTEOARTHRITIS OF RIGHT KNEE: Primary | ICD-10-CM

## 2023-03-10 NOTE — TELEPHONE ENCOUNTER
Future Appointments   Date Time Provider Cornelio Olivieri   5/5/2023  1:40 PM Bill Valdivia MD [facility]     Patient having R knee replacement surgery with Dr. Murali Lozano on 5/23/23. Our forms faxed to Dr. Kelly Gallagher [phone] and put in brown folder.

## 2023-03-13 ENCOUNTER — TELEPHONE (OUTPATIENT)
Dept: INTERNAL MEDICINE CLINIC | Facility: CLINIC | Age: 83
End: 2023-03-13

## 2023-03-14 ENCOUNTER — DOCUMENTATION ONLY (OUTPATIENT)
Dept: ORTHOPEDICS CLINIC | Facility: CLINIC | Age: 83
End: 2023-03-14

## 2023-03-14 RX ORDER — ALPRAZOLAM 0.25 MG/1
0.25 TABLET ORAL DAILY PRN
Qty: 30 TABLET | Refills: 0 | Status: SHIPPED | OUTPATIENT
Start: 2023-03-14 | End: 2023-03-16

## 2023-03-14 RX ORDER — ALPRAZOLAM 0.5 MG/1
0.5 TABLET ORAL 2 TIMES DAILY PRN
Qty: 60 TABLET | Refills: 0 | Status: CANCELLED | OUTPATIENT
Start: 2023-03-14

## 2023-03-14 NOTE — PROGRESS NOTES
Surgery Scheduling Sheet for Total Knee Arthroplasty  Erica Schwartz  4/23/1940    Procedure: Right total knee arthroplasty  Diagnosis: Right knee osteoarthritis   CPT Code: 69529  Anesthesia: Choice  Length of Surgery: 2 hours  Instruments: Medacta TKA   Assist: If case scheduled on Thurs/Fri, then Sudheer Jennyfer first assist.  If case scheduled on Tues, then Fátima Schroeder ([phone]) first assist. If Missael[address] Rise unavailable, then contact Ban Parnell ([phone]) for first assist. If Orest Rise and Lamont Ceja unavailable, then contact [facility] for first assist.    TESTING NEEDED  PAT: MRSA/MSSA, Covid, CBC, CMP, INR, PTT, T&S, hemoglobin a1c  Medical Clearance: Yes  Cardiac Clearance: No    Dental Clearance: No      MEDICAL HISTORY   Pacemaker:   Jennifer Butcher Allergy:   Latex Allergy:   Anticoagulants:   Medication Allergy:   Pre-Op sent to PCP:     Case Number:  Authorization Number:   PA Completed:   Home Health Order:   Care Partner:     Date of Surgery:   Post op Follow up:     Johnny Quevedo MD, [address] Orthopedic Surgery  Phone [phone]  Fax [phone]

## 2023-03-16 NOTE — TELEPHONE ENCOUNTER
Pt requested . 5mg of below medication and .25mg was sent in. Pt doesn't understand why? She takes 2 x .5mg daily.

## 2023-03-29 ENCOUNTER — TELEPHONE (OUTPATIENT)
Dept: INTERNAL MEDICINE CLINIC | Facility: CLINIC | Age: 83
End: 2023-03-29

## 2023-04-11 ENCOUNTER — WALK IN (OUTPATIENT)
Dept: URGENT CARE | Age: 83
End: 2023-04-11

## 2023-04-11 VITALS
TEMPERATURE: 97 F | OXYGEN SATURATION: 97 % | DIASTOLIC BLOOD PRESSURE: 70 MMHG | RESPIRATION RATE: 16 BRPM | HEART RATE: 71 BPM | SYSTOLIC BLOOD PRESSURE: 162 MMHG

## 2023-04-11 DIAGNOSIS — J02.0 STREP PHARYNGITIS: Primary | ICD-10-CM

## 2023-04-11 PROBLEM — R60.0 BILATERAL LEG EDEMA: Status: ACTIVE | Noted: 2022-07-01

## 2023-04-11 PROBLEM — E78.2 HYPERLIPIDEMIA, MIXED: Status: ACTIVE | Noted: 2022-07-01

## 2023-04-11 PROBLEM — Z87.898 HISTORY OF PALPITATIONS: Status: ACTIVE | Noted: 2022-05-18

## 2023-04-11 PROBLEM — M79.89 LEG SWELLING: Status: ACTIVE | Noted: 2022-05-18

## 2023-04-11 PROBLEM — H90.0 CONDUCTIVE HEARING LOSS, BILATERAL: Status: ACTIVE | Noted: 2022-05-18

## 2023-04-11 PROBLEM — E78.5 DYSLIPIDEMIA: Status: ACTIVE | Noted: 2022-05-18

## 2023-04-11 PROBLEM — R42 DIZZINESS: Status: ACTIVE | Noted: 2022-07-01

## 2023-04-11 PROBLEM — F32.A ANXIETY AND DEPRESSION: Status: ACTIVE | Noted: 2022-05-18

## 2023-04-11 PROBLEM — R20.0 RIGHT ARM NUMBNESS: Status: ACTIVE | Noted: 2022-07-01

## 2023-04-11 PROBLEM — G89.29 CHRONIC PAIN OF RIGHT KNEE: Status: ACTIVE | Noted: 2022-08-04

## 2023-04-11 PROBLEM — R73.03 PREDIABETES: Status: ACTIVE | Noted: 2022-08-04

## 2023-04-11 PROBLEM — Z90.710 HISTORY OF HYSTERECTOMY: Status: ACTIVE | Noted: 2022-07-01

## 2023-04-11 PROBLEM — I10 PRIMARY HYPERTENSION: Status: ACTIVE | Noted: 2022-05-18

## 2023-04-11 PROBLEM — Z78.0 POSTMENOPAUSAL: Status: ACTIVE | Noted: 2022-05-18

## 2023-04-11 PROBLEM — R00.2 PALPITATIONS: Status: ACTIVE | Noted: 2022-07-01

## 2023-04-11 PROBLEM — F41.9 ANXIETY AND DEPRESSION: Status: ACTIVE | Noted: 2022-05-18

## 2023-04-11 PROBLEM — R01.1 MURMUR: Status: ACTIVE | Noted: 2022-05-18

## 2023-04-11 PROBLEM — E04.1 THYROID NODULE: Status: ACTIVE | Noted: 2022-05-18

## 2023-04-11 PROBLEM — Z98.890 HISTORY OF BILATERAL CARPAL TUNNEL RELEASE: Status: ACTIVE | Noted: 2022-07-01

## 2023-04-11 PROBLEM — M25.561 CHRONIC PAIN OF RIGHT KNEE: Status: ACTIVE | Noted: 2022-08-04

## 2023-04-11 PROBLEM — H93.13 TINNITUS OF BOTH EARS: Status: ACTIVE | Noted: 2022-05-18

## 2023-04-11 PROBLEM — Z90.49 HISTORY OF CHOLECYSTECTOMY: Status: ACTIVE | Noted: 2022-07-01

## 2023-04-11 PROBLEM — G56.01 CARPAL TUNNEL SYNDROME OF RIGHT WRIST: Status: ACTIVE | Noted: 2022-08-04

## 2023-04-11 LAB
INTERNAL PROCEDURAL CONTROLS ACCEPTABLE: YES
S PYO AG THROAT QL IA.RAPID: POSITIVE
TEST LOT EXPIRATION DATE: ABNORMAL
TEST LOT NUMBER: ABNORMAL

## 2023-04-11 PROCEDURE — 87880 STREP A ASSAY W/OPTIC: CPT | Performed by: NURSE PRACTITIONER

## 2023-04-11 PROCEDURE — 99213 OFFICE O/P EST LOW 20 MIN: CPT | Performed by: NURSE PRACTITIONER

## 2023-04-11 RX ORDER — PENICILLIN V POTASSIUM 500 MG/1
500 TABLET ORAL 2 TIMES DAILY
Qty: 20 TABLET | Refills: 0 | Status: SHIPPED | OUTPATIENT
Start: 2023-04-11 | End: 2023-04-21

## 2023-04-11 RX ORDER — BENZONATATE 100 MG/1
CAPSULE ORAL
Qty: 30 CAPSULE | Refills: 0 | Status: SHIPPED | OUTPATIENT
Start: 2023-04-11

## 2023-04-11 RX ORDER — ALPRAZOLAM 0.25 MG/1
TABLET ORAL
COMMUNITY
Start: 2023-03-14

## 2023-04-11 ASSESSMENT — ENCOUNTER SYMPTOMS
SORE THROAT: 0
WHEEZING: 0
COUGH: 1
CONSTITUTIONAL NEGATIVE: 1
SHORTNESS OF BREATH: 0
RHINORRHEA: 1
CHEST TIGHTNESS: 0

## 2023-04-13 RX ORDER — ACETAMINOPHEN 160 MG
2000 TABLET,DISINTEGRATING ORAL DAILY
COMMUNITY

## 2023-04-13 RX ORDER — MELATONIN
1000 DAILY
COMMUNITY

## 2023-04-13 RX ORDER — ASCORBIC ACID 500 MG
1000 TABLET ORAL DAILY
COMMUNITY

## 2023-04-13 RX ORDER — PENICILLIN V POTASSIUM 500 MG/1
500 TABLET ORAL 2 TIMES DAILY
Status: ON HOLD | COMMUNITY
End: 2023-05-23 | Stop reason: CLARIF

## 2023-04-13 RX ORDER — B-COMPLEX WITH VITAMIN C
1 TABLET ORAL DAILY
COMMUNITY

## 2023-05-01 ENCOUNTER — LABORATORY ENCOUNTER (OUTPATIENT)
Dept: LAB | Age: 83
End: 2023-05-01
Attending: ORTHOPAEDIC SURGERY
Payer: MEDICARE

## 2023-05-01 ENCOUNTER — HOSPITAL ENCOUNTER (OUTPATIENT)
Dept: GENERAL RADIOLOGY | Age: 83
Discharge: HOME | End: 2023-05-01
Attending: ORTHOPAEDIC SURGERY
Payer: MEDICARE

## 2023-05-01 DIAGNOSIS — F41.9 ANXIETY AND DEPRESSION: ICD-10-CM

## 2023-05-01 DIAGNOSIS — M17.11 PRIMARY OSTEOARTHRITIS OF RIGHT KNEE: ICD-10-CM

## 2023-05-01 DIAGNOSIS — M17.11 OSTEOARTHRITIS OF RIGHT KNEE: ICD-10-CM

## 2023-05-01 DIAGNOSIS — R73.03 PREDIABETES: ICD-10-CM

## 2023-05-01 DIAGNOSIS — E78.5 DYSLIPIDEMIA: ICD-10-CM

## 2023-05-01 DIAGNOSIS — F32.A ANXIETY AND DEPRESSION: ICD-10-CM

## 2023-05-01 DIAGNOSIS — I10 PRIMARY HYPERTENSION: ICD-10-CM

## 2023-05-01 LAB
ALBUMIN SERPL-MCNC: 3.7 G/DL (ref 3.4–5)
ALBUMIN/GLOB SERPL: 0.9 {RATIO} (ref 1–2)
ALP LIVER SERPL-CCNC: 71 U/L
ALT SERPL-CCNC: 27 U/L
ANION GAP SERPL CALC-SCNC: 2 MMOL/L (ref 0–18)
ANTIBODY SCREEN: NEGATIVE
APTT PPP: 28.7 SECONDS (ref 23.3–35.6)
AST SERPL-CCNC: 24 U/L (ref 15–37)
BASOPHILS # BLD AUTO: 0.03 X10(3) UL (ref 0–0.2)
BASOPHILS NFR BLD AUTO: 0.3 %
BILIRUB SERPL-MCNC: 0.6 MG/DL (ref 0.1–2)
BUN BLD-MCNC: 10 MG/DL (ref 7–18)
CALCIUM BLD-MCNC: 9.2 MG/DL (ref 8.5–10.1)
CHLORIDE SERPL-SCNC: 105 MMOL/L (ref 98–112)
CO2 SERPL-SCNC: 31 MMOL/L (ref 21–32)
CREAT BLD-MCNC: 0.68 MG/DL
EOSINOPHIL # BLD AUTO: 0.2 X10(3) UL (ref 0–0.7)
EOSINOPHIL NFR BLD AUTO: 2.2 %
ERYTHROCYTE [DISTWIDTH] IN BLOOD BY AUTOMATED COUNT: 13.8 %
EST. AVERAGE GLUCOSE BLD GHB EST-MCNC: 134 MG/DL (ref 68–126)
FASTING STATUS PATIENT QL REPORTED: YES
GFR SERPLBLD BASED ON 1.73 SQ M-ARVRAT: 86 ML/MIN/1.73M2 (ref 60–?)
GLOBULIN PLAS-MCNC: 4 G/DL (ref 2.8–4.4)
GLUCOSE BLD-MCNC: 125 MG/DL (ref 70–99)
HBA1C MFR BLD: 6.3 % (ref ?–5.7)
HCT VFR BLD AUTO: 46.4 %
HGB BLD-MCNC: 14.6 G/DL
IMM GRANULOCYTES # BLD AUTO: 0.03 X10(3) UL (ref 0–1)
IMM GRANULOCYTES NFR BLD: 0.3 %
INR BLD: 0.94 (ref 0.85–1.16)
LYMPHOCYTES # BLD AUTO: 1.45 X10(3) UL (ref 1–4)
LYMPHOCYTES NFR BLD AUTO: 16.3 %
MCH RBC QN AUTO: 28.1 PG (ref 26–34)
MCHC RBC AUTO-ENTMCNC: 31.5 G/DL (ref 31–37)
MCV RBC AUTO: 89.4 FL
MONOCYTES # BLD AUTO: 0.76 X10(3) UL (ref 0.1–1)
MONOCYTES NFR BLD AUTO: 8.5 %
NEUTROPHILS # BLD AUTO: 6.45 X10 (3) UL (ref 1.5–7.7)
NEUTROPHILS # BLD AUTO: 6.45 X10(3) UL (ref 1.5–7.7)
NEUTROPHILS NFR BLD AUTO: 72.4 %
OSMOLALITY SERPL CALC.SUM OF ELEC: 287 MOSM/KG (ref 275–295)
PLATELET # BLD AUTO: 229 10(3)UL (ref 150–450)
POTASSIUM SERPL-SCNC: 4 MMOL/L (ref 3.5–5.1)
PROT SERPL-MCNC: 7.7 G/DL (ref 6.4–8.2)
PROTHROMBIN TIME: 12.6 SECONDS (ref 11.6–14.8)
RBC # BLD AUTO: 5.19 X10(6)UL
RH BLOOD TYPE: POSITIVE
SODIUM SERPL-SCNC: 138 MMOL/L (ref 136–145)
WBC # BLD AUTO: 8.9 X10(3) UL (ref 4–11)

## 2023-05-01 PROCEDURE — 77073 BONE LENGTH STUDIES: CPT | Performed by: ORTHOPAEDIC SURGERY

## 2023-05-01 PROCEDURE — 80053 COMPREHEN METABOLIC PANEL: CPT

## 2023-05-01 PROCEDURE — 83036 HEMOGLOBIN GLYCOSYLATED A1C: CPT

## 2023-05-01 PROCEDURE — 86901 BLOOD TYPING SEROLOGIC RH(D): CPT

## 2023-05-01 PROCEDURE — 85025 COMPLETE CBC W/AUTO DIFF WBC: CPT

## 2023-05-01 PROCEDURE — 86850 RBC ANTIBODY SCREEN: CPT

## 2023-05-01 PROCEDURE — 85730 THROMBOPLASTIN TIME PARTIAL: CPT

## 2023-05-01 PROCEDURE — 87081 CULTURE SCREEN ONLY: CPT

## 2023-05-01 PROCEDURE — 85610 PROTHROMBIN TIME: CPT

## 2023-05-01 PROCEDURE — 86900 BLOOD TYPING SEROLOGIC ABO: CPT

## 2023-05-01 PROCEDURE — 36415 COLL VENOUS BLD VENIPUNCTURE: CPT

## 2023-05-05 ENCOUNTER — OFFICE VISIT (OUTPATIENT)
Dept: INTERNAL MEDICINE CLINIC | Facility: CLINIC | Age: 83
End: 2023-05-05
Payer: MEDICARE

## 2023-05-05 VITALS
TEMPERATURE: 97 F | BODY MASS INDEX: 32.22 KG/M2 | HEIGHT: 61 IN | OXYGEN SATURATION: 98 % | RESPIRATION RATE: 21 BRPM | WEIGHT: 170.63 LBS | SYSTOLIC BLOOD PRESSURE: 148 MMHG | HEART RATE: 68 BPM | DIASTOLIC BLOOD PRESSURE: 78 MMHG

## 2023-05-05 DIAGNOSIS — M17.11 PRIMARY OSTEOARTHRITIS OF RIGHT KNEE: ICD-10-CM

## 2023-05-05 DIAGNOSIS — Z01.818 PREOP EXAMINATION: Primary | ICD-10-CM

## 2023-05-05 DIAGNOSIS — E78.5 DYSLIPIDEMIA: ICD-10-CM

## 2023-05-05 DIAGNOSIS — I10 PRIMARY HYPERTENSION: ICD-10-CM

## 2023-05-05 DIAGNOSIS — R01.1 CARDIAC MURMUR: ICD-10-CM

## 2023-05-05 LAB
ATRIAL RATE: 63 BPM
P AXIS: 68 DEGREES
P-R INTERVAL: 190 MS
Q-T INTERVAL: 426 MS
QRS DURATION: 82 MS
QTC CALCULATION (BEZET): 435 MS
R AXIS: -2 DEGREES
T AXIS: 70 DEGREES
VENTRICULAR RATE: 63 BPM

## 2023-05-05 RX ORDER — LEVOCETIRIZINE DIHYDROCHLORIDE 5 MG/1
5 TABLET, FILM COATED ORAL EVERY EVENING
Qty: 90 TABLET | Refills: 0 | Status: SHIPPED | OUTPATIENT
Start: 2023-05-05

## 2023-05-08 RX ORDER — SIMVASTATIN 20 MG
20 TABLET ORAL EVERY EVENING
Qty: 30 TABLET | Refills: 0 | Status: SHIPPED | OUTPATIENT
Start: 2023-05-08 | End: 2023-05-08

## 2023-05-08 RX ORDER — ALPRAZOLAM 0.5 MG/1
0.5 TABLET ORAL 2 TIMES DAILY PRN
Qty: 60 TABLET | Refills: 0 | Status: SHIPPED | OUTPATIENT
Start: 2023-05-08

## 2023-05-08 RX ORDER — SIMVASTATIN 20 MG
TABLET ORAL
Qty: 90 TABLET | Refills: 0 | Status: SHIPPED | OUTPATIENT
Start: 2023-05-08

## 2023-05-08 NOTE — TELEPHONE ENCOUNTER
Last OV 5/5/23   Last PE 8/4/22   Last REFILL Alprazolam 3/16/23  Last LABS 5/1/23     Future Appointments   Date Time Provider Cornelio Nelson   6/7/2023 10:00 AM Donte Genao PA-C [facility]         Per PROTOCOL?     Please Advise

## 2023-05-15 ENCOUNTER — TELEPHONE (OUTPATIENT)
Dept: ORTHOPEDICS CLINIC | Facility: CLINIC | Age: 83
End: 2023-05-15

## 2023-05-15 ENCOUNTER — PATIENT MESSAGE (OUTPATIENT)
Dept: ORTHOPEDICS CLINIC | Facility: CLINIC | Age: 83
End: 2023-05-15

## 2023-05-17 ENCOUNTER — TELEPHONE (OUTPATIENT)
Dept: ORTHOPEDICS CLINIC | Facility: CLINIC | Age: 83
End: 2023-05-17

## 2023-05-17 ENCOUNTER — MED REC SCAN ONLY (OUTPATIENT)
Dept: ORTHOPEDICS CLINIC | Facility: CLINIC | Age: 83
End: 2023-05-17

## 2023-05-18 NOTE — TELEPHONE ENCOUNTER
Called pts daughter in law and she will come tomorrow 05/19 to pick it up. Fee has not been completed. Please collect fee when she comes to pick it up. Thanks!

## 2023-05-18 NOTE — TELEPHONE ENCOUNTER
Please call patient's daughter in law Laureen Minors to inform her parking placard has been completed  Per patient request at [phone]     Patient needs to pay $25 forms completion fee     Paper work placed at the front office of [employer] for patient  copy sent to scanning

## 2023-05-19 NOTE — TELEPHONE ENCOUNTER
Patient's daughter in law stopped by [facility] office to  placard form and paid $25 form fee.

## 2023-05-23 ENCOUNTER — APPOINTMENT (OUTPATIENT)
Dept: GENERAL RADIOLOGY | Facility: HOSPITAL | Age: 83
End: 2023-05-23
Attending: ORTHOPAEDIC SURGERY
Payer: MEDICARE

## 2023-05-23 ENCOUNTER — HOSPITAL ENCOUNTER (INPATIENT)
Facility: HOSPITAL | Age: 83
LOS: 1 days | Discharge: HOME HEALTH | End: 2023-05-24
Attending: ORTHOPAEDIC SURGERY | Admitting: ORTHOPAEDIC SURGERY
Payer: MEDICARE

## 2023-05-23 ENCOUNTER — HOSPITAL ENCOUNTER (INPATIENT)
Facility: HOSPITAL | Age: 83
LOS: 1 days | Discharge: HOME HEALTH | DRG: 470 | End: 2023-05-24
Attending: ORTHOPAEDIC SURGERY | Admitting: ORTHOPAEDIC SURGERY
Payer: MEDICARE

## 2023-05-23 ENCOUNTER — ANESTHESIA EVENT (OUTPATIENT)
Dept: SURGERY | Facility: HOSPITAL | Age: 83
End: 2023-05-23
Payer: MEDICARE

## 2023-05-23 ENCOUNTER — APPOINTMENT (OUTPATIENT)
Dept: GENERAL RADIOLOGY | Facility: HOSPITAL | Age: 83
DRG: 470 | End: 2023-05-23
Attending: ORTHOPAEDIC SURGERY
Payer: MEDICARE

## 2023-05-23 ENCOUNTER — ANESTHESIA (OUTPATIENT)
Dept: SURGERY | Facility: HOSPITAL | Age: 83
End: 2023-05-23
Payer: MEDICARE

## 2023-05-23 DIAGNOSIS — M17.11 OSTEOARTHRITIS OF RIGHT KNEE: Primary | ICD-10-CM

## 2023-05-23 DIAGNOSIS — E78.5 DYSLIPIDEMIA: ICD-10-CM

## 2023-05-23 DIAGNOSIS — R73.03 PREDIABETES: ICD-10-CM

## 2023-05-23 DIAGNOSIS — I10 PRIMARY HYPERTENSION: ICD-10-CM

## 2023-05-23 DIAGNOSIS — F41.9 ANXIETY AND DEPRESSION: ICD-10-CM

## 2023-05-23 DIAGNOSIS — F32.A ANXIETY AND DEPRESSION: ICD-10-CM

## 2023-05-23 DIAGNOSIS — M17.11 PRIMARY OSTEOARTHRITIS OF RIGHT KNEE: ICD-10-CM

## 2023-05-23 LAB
CREAT BLD-MCNC: 0.65 MG/DL
GFR SERPLBLD BASED ON 1.73 SQ M-ARVRAT: 87 ML/MIN/1.73M2 (ref 60–?)

## 2023-05-23 PROCEDURE — 73560 X-RAY EXAM OF KNEE 1 OR 2: CPT | Performed by: ORTHOPAEDIC SURGERY

## 2023-05-23 PROCEDURE — 27447 TOTAL KNEE ARTHROPLASTY: CPT | Performed by: ORTHOPAEDIC SURGERY

## 2023-05-23 PROCEDURE — 76942 ECHO GUIDE FOR BIOPSY: CPT | Performed by: STUDENT IN AN ORGANIZED HEALTH CARE EDUCATION/TRAINING PROGRAM

## 2023-05-23 PROCEDURE — 3E0T3BZ INTRODUCTION OF ANESTHETIC AGENT INTO PERIPHERAL NERVES AND PLEXI, PERCUTANEOUS APPROACH: ICD-10-PCS | Performed by: STUDENT IN AN ORGANIZED HEALTH CARE EDUCATION/TRAINING PROGRAM

## 2023-05-23 PROCEDURE — 99223 1ST HOSP IP/OBS HIGH 75: CPT | Performed by: HOSPITALIST

## 2023-05-23 PROCEDURE — 0SRC0J9 REPLACEMENT OF RIGHT KNEE JOINT WITH SYNTHETIC SUBSTITUTE, CEMENTED, OPEN APPROACH: ICD-10-PCS | Performed by: ORTHOPAEDIC SURGERY

## 2023-05-23 DEVICE — TIBIAL INSERT FIXED SPHERE FLEX  #4/11 MM R
Type: IMPLANTABLE DEVICE | Site: KNEE | Status: FUNCTIONAL
Brand: GMK SPHERE TOTAL KNEE SYSTEM

## 2023-05-23 DEVICE — FEMUR SPHERE CEMENTED RIGHT, SIZE 4
Type: IMPLANTABLE DEVICE | Site: KNEE | Status: FUNCTIONAL
Brand: GMK SPHERE TOTAL KNEE SYSTEM

## 2023-05-23 DEVICE — RESURFACING PATELLA SIZE 2
Type: IMPLANTABLE DEVICE | Site: KNEE | Status: FUNCTIONAL
Brand: GMK PRIMARY TOTAL KNEE SYSTEM

## 2023-05-23 DEVICE — FIXED TIBIAL TRAY CEMENTED RIGHT, SIZE 4
Type: IMPLANTABLE DEVICE | Site: KNEE | Status: FUNCTIONAL
Brand: GMK PRIMARY TOTAL KNEE SYSTEM

## 2023-05-23 DEVICE — IMPLANTABLE DEVICE
Type: IMPLANTABLE DEVICE | Site: KNEE | Status: FUNCTIONAL
Brand: BIOMET® BONE CEMENT R

## 2023-05-23 RX ORDER — CEFAZOLIN SODIUM/WATER 2 G/20 ML
SYRINGE (ML) INTRAVENOUS
Status: DISPENSED
Start: 2023-05-23 | End: 2023-05-23

## 2023-05-23 RX ORDER — BISACODYL 10 MG
10 SUPPOSITORY, RECTAL RECTAL
Status: DISCONTINUED | OUTPATIENT
Start: 2023-05-23 | End: 2023-05-24

## 2023-05-23 RX ORDER — ACETAMINOPHEN 500 MG
1000 TABLET ORAL ONCE
Status: DISCONTINUED | OUTPATIENT
Start: 2023-05-23 | End: 2023-05-23 | Stop reason: HOSPADM

## 2023-05-23 RX ORDER — SERTRALINE HYDROCHLORIDE 100 MG/1
100 TABLET, FILM COATED ORAL DAILY
Status: DISCONTINUED | OUTPATIENT
Start: 2023-05-24 | End: 2023-05-24

## 2023-05-23 RX ORDER — HYDROMORPHONE HYDROCHLORIDE 1 MG/ML
0.6 INJECTION, SOLUTION INTRAMUSCULAR; INTRAVENOUS; SUBCUTANEOUS EVERY 5 MIN PRN
Status: DISCONTINUED | OUTPATIENT
Start: 2023-05-23 | End: 2023-05-23 | Stop reason: HOSPADM

## 2023-05-23 RX ORDER — ACETAMINOPHEN 500 MG
1000 TABLET ORAL EVERY 8 HOURS
Qty: 90 TABLET | Refills: 0 | Status: SHIPPED | OUTPATIENT
Start: 2023-05-23 | End: 2023-06-07

## 2023-05-23 RX ORDER — TRAMADOL HYDROCHLORIDE 50 MG/1
50 TABLET ORAL EVERY 12 HOURS SCHEDULED
Qty: 30 TABLET | Refills: 0 | Status: SHIPPED | OUTPATIENT
Start: 2023-05-23 | End: 2023-06-07

## 2023-05-23 RX ORDER — CEFAZOLIN SODIUM/WATER 2 G/20 ML
2 SYRINGE (ML) INTRAVENOUS ONCE
Status: COMPLETED | OUTPATIENT
Start: 2023-05-23 | End: 2023-05-23

## 2023-05-23 RX ORDER — ONDANSETRON 2 MG/ML
INJECTION INTRAMUSCULAR; INTRAVENOUS AS NEEDED
Status: DISCONTINUED | OUTPATIENT
Start: 2023-05-23 | End: 2023-05-23 | Stop reason: SURG

## 2023-05-23 RX ORDER — DIPHENHYDRAMINE HYDROCHLORIDE 50 MG/ML
12.5 INJECTION INTRAMUSCULAR; INTRAVENOUS EVERY 4 HOURS PRN
Status: DISCONTINUED | OUTPATIENT
Start: 2023-05-23 | End: 2023-05-24

## 2023-05-23 RX ORDER — CELECOXIB 200 MG/1
200 CAPSULE ORAL DAILY
Qty: 30 CAPSULE | Refills: 0 | Status: SHIPPED | OUTPATIENT
Start: 2023-05-23 | End: 2023-06-22

## 2023-05-23 RX ORDER — TRANEXAMIC ACID 10 MG/ML
1000 INJECTION, SOLUTION INTRAVENOUS ONCE
Status: DISCONTINUED | OUTPATIENT
Start: 2023-05-23 | End: 2023-05-23 | Stop reason: HOSPADM

## 2023-05-23 RX ORDER — SENNOSIDES 8.6 MG
17.2 TABLET ORAL NIGHTLY
Status: DISCONTINUED | OUTPATIENT
Start: 2023-05-23 | End: 2023-05-24

## 2023-05-23 RX ORDER — SODIUM CHLORIDE, SODIUM LACTATE, POTASSIUM CHLORIDE, CALCIUM CHLORIDE 600; 310; 30; 20 MG/100ML; MG/100ML; MG/100ML; MG/100ML
INJECTION, SOLUTION INTRAVENOUS CONTINUOUS
Status: DISCONTINUED | OUTPATIENT
Start: 2023-05-23 | End: 2023-05-23 | Stop reason: HOSPADM

## 2023-05-23 RX ORDER — ACETAMINOPHEN 500 MG
1000 TABLET ORAL ONCE AS NEEDED
Status: DISCONTINUED | OUTPATIENT
Start: 2023-05-23 | End: 2023-05-23 | Stop reason: HOSPADM

## 2023-05-23 RX ORDER — AMLODIPINE BESYLATE AND BENAZEPRIL HYDROCHLORIDE 5; 10 MG/1; MG/1
1 CAPSULE ORAL DAILY
Status: DISCONTINUED | OUTPATIENT
Start: 2023-05-23 | End: 2023-05-23 | Stop reason: SDUPTHER

## 2023-05-23 RX ORDER — BUPIVACAINE HYDROCHLORIDE 5 MG/ML
INJECTION, SOLUTION EPIDURAL; INTRACAUDAL AS NEEDED
Status: DISCONTINUED | OUTPATIENT
Start: 2023-05-23 | End: 2023-05-23 | Stop reason: SURG

## 2023-05-23 RX ORDER — ASPIRIN 81 MG/1
81 TABLET ORAL 2 TIMES DAILY
Qty: 80 TABLET | Refills: 0 | Status: SHIPPED | OUTPATIENT
Start: 2023-05-23 | End: 2023-06-03

## 2023-05-23 RX ORDER — DEXAMETHASONE SODIUM PHOSPHATE 4 MG/ML
VIAL (ML) INJECTION AS NEEDED
Status: DISCONTINUED | OUTPATIENT
Start: 2023-05-23 | End: 2023-05-23 | Stop reason: SURG

## 2023-05-23 RX ORDER — HYDROMORPHONE HYDROCHLORIDE 1 MG/ML
0.4 INJECTION, SOLUTION INTRAMUSCULAR; INTRAVENOUS; SUBCUTANEOUS EVERY 5 MIN PRN
Status: DISCONTINUED | OUTPATIENT
Start: 2023-05-23 | End: 2023-05-23 | Stop reason: HOSPADM

## 2023-05-23 RX ORDER — ONDANSETRON 2 MG/ML
4 INJECTION INTRAMUSCULAR; INTRAVENOUS EVERY 6 HOURS PRN
Status: DISCONTINUED | OUTPATIENT
Start: 2023-05-23 | End: 2023-05-23 | Stop reason: HOSPADM

## 2023-05-23 RX ORDER — METOCLOPRAMIDE HYDROCHLORIDE 5 MG/ML
10 INJECTION INTRAMUSCULAR; INTRAVENOUS EVERY 8 HOURS PRN
Status: DISCONTINUED | OUTPATIENT
Start: 2023-05-23 | End: 2023-05-23 | Stop reason: HOSPADM

## 2023-05-23 RX ORDER — TRANEXAMIC ACID 10 MG/ML
INJECTION, SOLUTION INTRAVENOUS AS NEEDED
Status: DISCONTINUED | OUTPATIENT
Start: 2023-05-23 | End: 2023-05-23 | Stop reason: SURG

## 2023-05-23 RX ORDER — SODIUM CHLORIDE, SODIUM LACTATE, POTASSIUM CHLORIDE, CALCIUM CHLORIDE 600; 310; 30; 20 MG/100ML; MG/100ML; MG/100ML; MG/100ML
INJECTION, SOLUTION INTRAVENOUS CONTINUOUS
Status: DISCONTINUED | OUTPATIENT
Start: 2023-05-23 | End: 2023-05-24

## 2023-05-23 RX ORDER — OXYCODONE HYDROCHLORIDE 5 MG/1
5 TABLET ORAL EVERY 4 HOURS PRN
Status: DISCONTINUED | OUTPATIENT
Start: 2023-05-23 | End: 2023-05-24

## 2023-05-23 RX ORDER — METOCLOPRAMIDE HYDROCHLORIDE 5 MG/ML
10 INJECTION INTRAMUSCULAR; INTRAVENOUS EVERY 8 HOURS PRN
Status: DISCONTINUED | OUTPATIENT
Start: 2023-05-23 | End: 2023-05-24

## 2023-05-23 RX ORDER — ENEMA 19; 7 G/133ML; G/133ML
1 ENEMA RECTAL ONCE AS NEEDED
Status: DISCONTINUED | OUTPATIENT
Start: 2023-05-23 | End: 2023-05-24

## 2023-05-23 RX ORDER — OXYCODONE HYDROCHLORIDE 5 MG/1
2.5 TABLET ORAL EVERY 4 HOURS PRN
Status: DISCONTINUED | OUTPATIENT
Start: 2023-05-23 | End: 2023-05-24

## 2023-05-23 RX ORDER — OXYCODONE HYDROCHLORIDE 5 MG/1
TABLET ORAL EVERY 4 HOURS PRN
Qty: 40 TABLET | Refills: 0 | Status: SHIPPED | OUTPATIENT
Start: 2023-05-23

## 2023-05-23 RX ORDER — HYDROCODONE BITARTRATE AND ACETAMINOPHEN 5; 325 MG/1; MG/1
1 TABLET ORAL ONCE AS NEEDED
Status: DISCONTINUED | OUTPATIENT
Start: 2023-05-23 | End: 2023-05-23 | Stop reason: HOSPADM

## 2023-05-23 RX ORDER — CEFAZOLIN SODIUM/WATER 2 G/20 ML
2 SYRINGE (ML) INTRAVENOUS EVERY 8 HOURS
Status: COMPLETED | OUTPATIENT
Start: 2023-05-23 | End: 2023-05-24

## 2023-05-23 RX ORDER — PSEUDOEPHEDRINE HCL 30 MG
100 TABLET ORAL 2 TIMES DAILY PRN
Qty: 30 CAPSULE | Refills: 0 | Status: SHIPPED | OUTPATIENT
Start: 2023-05-23

## 2023-05-23 RX ORDER — DEXAMETHASONE SODIUM PHOSPHATE 10 MG/ML
INJECTION, SOLUTION INTRAMUSCULAR; INTRAVENOUS AS NEEDED
Status: DISCONTINUED | OUTPATIENT
Start: 2023-05-23 | End: 2023-05-23 | Stop reason: SURG

## 2023-05-23 RX ORDER — KETOROLAC TROMETHAMINE 15 MG/ML
15 INJECTION, SOLUTION INTRAMUSCULAR; INTRAVENOUS EVERY 6 HOURS
Status: COMPLETED | OUTPATIENT
Start: 2023-05-23 | End: 2023-05-24

## 2023-05-23 RX ORDER — MELATONIN
325
Status: DISCONTINUED | OUTPATIENT
Start: 2023-05-23 | End: 2023-05-24

## 2023-05-23 RX ORDER — NALOXONE HYDROCHLORIDE 0.4 MG/ML
80 INJECTION, SOLUTION INTRAMUSCULAR; INTRAVENOUS; SUBCUTANEOUS AS NEEDED
Status: DISCONTINUED | OUTPATIENT
Start: 2023-05-23 | End: 2023-05-23 | Stop reason: HOSPADM

## 2023-05-23 RX ORDER — SIMVASTATIN 20 MG
20 TABLET ORAL NIGHTLY
COMMUNITY

## 2023-05-23 RX ORDER — DOCUSATE SODIUM 100 MG/1
100 CAPSULE, LIQUID FILLED ORAL 2 TIMES DAILY
Status: DISCONTINUED | OUTPATIENT
Start: 2023-05-23 | End: 2023-05-24

## 2023-05-23 RX ORDER — ACETAMINOPHEN 325 MG/1
TABLET ORAL
Status: COMPLETED
Start: 2023-05-23 | End: 2023-05-23

## 2023-05-23 RX ORDER — ATORVASTATIN CALCIUM 10 MG/1
10 TABLET, FILM COATED ORAL NIGHTLY
Status: DISCONTINUED | OUTPATIENT
Start: 2023-05-23 | End: 2023-05-24

## 2023-05-23 RX ORDER — TRAMADOL HYDROCHLORIDE 50 MG/1
50 TABLET ORAL EVERY 12 HOURS SCHEDULED
Status: DISCONTINUED | OUTPATIENT
Start: 2023-05-23 | End: 2023-05-24

## 2023-05-23 RX ORDER — ASPIRIN 81 MG/1
81 TABLET ORAL 2 TIMES DAILY
Status: DISCONTINUED | OUTPATIENT
Start: 2023-05-23 | End: 2023-05-24

## 2023-05-23 RX ORDER — ONDANSETRON 2 MG/ML
4 INJECTION INTRAMUSCULAR; INTRAVENOUS EVERY 6 HOURS PRN
Status: DISCONTINUED | OUTPATIENT
Start: 2023-05-23 | End: 2023-05-24

## 2023-05-23 RX ORDER — DIPHENHYDRAMINE HYDROCHLORIDE 50 MG/ML
25 INJECTION INTRAMUSCULAR; INTRAVENOUS ONCE AS NEEDED
Status: ACTIVE | OUTPATIENT
Start: 2023-05-23 | End: 2023-05-23

## 2023-05-23 RX ORDER — HYDROCODONE BITARTRATE AND ACETAMINOPHEN 5; 325 MG/1; MG/1
2 TABLET ORAL ONCE AS NEEDED
Status: DISCONTINUED | OUTPATIENT
Start: 2023-05-23 | End: 2023-05-23 | Stop reason: HOSPADM

## 2023-05-23 RX ORDER — POLYETHYLENE GLYCOL 3350 17 G/17G
17 POWDER, FOR SOLUTION ORAL DAILY PRN
Status: DISCONTINUED | OUTPATIENT
Start: 2023-05-23 | End: 2023-05-24

## 2023-05-23 RX ORDER — ALPRAZOLAM 0.5 MG/1
0.5 TABLET ORAL 2 TIMES DAILY PRN
Status: DISCONTINUED | OUTPATIENT
Start: 2023-05-23 | End: 2023-05-24

## 2023-05-23 RX ORDER — HYDROMORPHONE HYDROCHLORIDE 1 MG/ML
0.2 INJECTION, SOLUTION INTRAMUSCULAR; INTRAVENOUS; SUBCUTANEOUS EVERY 5 MIN PRN
Status: DISCONTINUED | OUTPATIENT
Start: 2023-05-23 | End: 2023-05-23 | Stop reason: HOSPADM

## 2023-05-23 RX ORDER — HYDROMORPHONE HYDROCHLORIDE 1 MG/ML
0.2 INJECTION, SOLUTION INTRAMUSCULAR; INTRAVENOUS; SUBCUTANEOUS EVERY 2 HOUR PRN
Status: DISCONTINUED | OUTPATIENT
Start: 2023-05-23 | End: 2023-05-24

## 2023-05-23 RX ORDER — ACETAMINOPHEN 325 MG/1
650 TABLET ORAL 4 TIMES DAILY
Status: DISCONTINUED | OUTPATIENT
Start: 2023-05-23 | End: 2023-05-24

## 2023-05-23 RX ORDER — DIPHENHYDRAMINE HCL 25 MG
25 CAPSULE ORAL EVERY 4 HOURS PRN
Status: DISCONTINUED | OUTPATIENT
Start: 2023-05-23 | End: 2023-05-24

## 2023-05-23 RX ORDER — DEXAMETHASONE SODIUM PHOSPHATE 10 MG/ML
8 INJECTION, SOLUTION INTRAMUSCULAR; INTRAVENOUS ONCE
Status: COMPLETED | OUTPATIENT
Start: 2023-05-24 | End: 2023-05-24

## 2023-05-23 RX ORDER — HYDROMORPHONE HYDROCHLORIDE 1 MG/ML
0.4 INJECTION, SOLUTION INTRAMUSCULAR; INTRAVENOUS; SUBCUTANEOUS EVERY 2 HOUR PRN
Status: DISCONTINUED | OUTPATIENT
Start: 2023-05-23 | End: 2023-05-24

## 2023-05-23 RX ORDER — BUPRENORPHINE HYDROCHLORIDE 0.32 MG/ML
INJECTION INTRAMUSCULAR; INTRAVENOUS AS NEEDED
Status: DISCONTINUED | OUTPATIENT
Start: 2023-05-23 | End: 2023-05-23 | Stop reason: SURG

## 2023-05-23 RX ADMIN — BUPRENORPHINE HYDROCHLORIDE 150 MCG: 0.32 INJECTION INTRAMUSCULAR; INTRAVENOUS at 07:23:00

## 2023-05-23 RX ADMIN — TRANEXAMIC ACID 1000 MG: 10 INJECTION, SOLUTION INTRAVENOUS at 07:29:00

## 2023-05-23 RX ADMIN — DEXAMETHASONE SODIUM PHOSPHATE 2 MG: 10 INJECTION, SOLUTION INTRAMUSCULAR; INTRAVENOUS at 07:23:00

## 2023-05-23 RX ADMIN — DEXAMETHASONE SODIUM PHOSPHATE 4 MG: 4 MG/ML VIAL (ML) INJECTION at 07:44:00

## 2023-05-23 RX ADMIN — CEFAZOLIN SODIUM/WATER 2 G: 2 G/20 ML SYRINGE (ML) INTRAVENOUS at 07:29:00

## 2023-05-23 RX ADMIN — BUPIVACAINE HYDROCHLORIDE 2.7 ML: 5 INJECTION, SOLUTION EPIDURAL; INTRACAUDAL at 07:17:00

## 2023-05-23 RX ADMIN — ONDANSETRON 4 MG: 2 INJECTION INTRAMUSCULAR; INTRAVENOUS at 09:33:00

## 2023-05-23 RX ADMIN — SODIUM CHLORIDE, SODIUM LACTATE, POTASSIUM CHLORIDE, CALCIUM CHLORIDE: 600; 310; 30; 20 INJECTION, SOLUTION INTRAVENOUS at 09:42:00

## 2023-05-23 NOTE — ANESTHESIA PROCEDURE NOTES
Spinal Block    Performed by: Kathya Phillips DO  Authorized by: Kathya Phillips DO      General Information and Staff    Start Time:   Anesthesiologist:  Kathya Phillips DO  Performed by:   Anesthesiologist  Site identification: surface landmarks    Preanesthetic Checklist: patient identified, IV checked, risks and benefits discussed, monitors and equipment checked, pre-op evaluation, timeout performed, anesthesia consent and sterile technique used      Procedure Details    Patient Position:  Sitting  Prep: ChloraPrep    Monitoring:  Cardiac monitor, heart rate and continuous pulse ox  Approach:  Midline  Location:  L3-4  Injection Technique:  Single-shot    Needle    Needle Type:  Sprotte  Needle Gauge:  24 G  Needle Length:  3.5 in    Assessment    Sensory Level:  T10  Events: clear CSF, CSF aspirated, well tolerated and blood negative      Additional Comments

## 2023-05-23 NOTE — CM/SW NOTE
05/23/23 1500   CM/SW Referral Data   Referral Source Social Work (self-referral)   Reason for Referral Discharge planning   Informant EMR;Clinical Staff Member   Discharge Needs   Anticipated D/C needs Home health care       Patient is an 81 y/o woman admitted s/p TKA. Pt with pre-operative plan for Residential at [place]. PT recommending [facility] services at discharge. Jose Russ at [facility] [place] confirmed pt accepted for [facility] services at [place]. No other [place] needs/concerns identified at this time. / to remain available for support and/or discharge planning.      Sophia Eaton, [facility]  Discharge Planner  [phone]

## 2023-05-23 NOTE — OPERATIVE REPORT
Operative Note    Patient Name/: Erica Schwartz 1940  Date: 2023  Location: [facility]  Preoperative Diagnosis: Right knee osteoarthritis  Postoperative Diagnosis: Same  Operation: Right total knee arthroplasty mechanical alignment  Primary Surgeon: Maxwell Fu  Assistant: Steffany Plummer surgical assistant first assist.  Yola Salguero also assisted in this case. Please note a skilled surgical assistant was necessary for this case in order to assist with patient positioning, prepping, draping, incision, exposure, implant placement, wound closure. Indications: 80-year-old female with end-stage valgus osteoarthritis failed nonsurgical treatment  Surgical Findings: Valgus OA requiring lateral releases  Operative Report: After informed consent, the right lower extremity was marked. Patient was brought to the operating room where spinal anesthesia was induced. Preoperative exam demonstrated range of motion from 5 degrees short of full extension, gravity flexion to 90 degrees with a firm stop. The right lower extremity was prepped and draped in sterile fashion. Timeout was performed to verify correct surgical site and procedure. 2 g of Ancef was given within 1 hour of incision. Additionally, 1 g tranexamic acid was given intravenously. Next the limb was gravity exsanguinated and tourniquet inflated. Incision was made anteriorly from 2 fingerbreadths proximal to the patella down along the medial aspect of the tibial tubercle. This was carried down to the extensor mechanism. Medial and lateral flaps were developed. The VMO muscle belly was identified. Full-thickness medial parapatellar arthrotomy was made from 2 fingerbreadths proximal to the patella along the VMO muscle belly, around the medial patella and patellar tendon. The fat pad was dissected free from the patellar tendon and reflected medially.   Subperiosteal dissection was carried out posteriorly about the proximal medial aspect of the tibia. Osteophytes were removed from the proximal medial tibia. The lateral patellofemoral plica was incised, and the patella was everted and denervated. The synovium over the anterior distal femur was teed open. Next the knee was flexed up, and remnant of the ACL and lateral meniscus were excised. Whitesides line was marked on the trochlear groove. I used an intramedullary reamer to open up the femur for our intramedullary alignment guide. The distal femoral cutting block was placed on the distal femur to take off 10 mm of bone. It sat down on the distal medial femur, measuring to take off more medial bone than lateral.  This was pinned in place. A juju's wing was used to check the cut, and the cut was performed. The distal medial femur fragment measured 8 mm. Next the knee was flexed and the tibial crest was marked. An extra medullary tibial cutting guide was placed above the ankle, measuring to take off a few millimeters off the medial side, pinned in line with the tibial crest in the coronal plane and with a few degrees of posterior slope in the sagittal plane. The proximal tibia was cut, using a Z retractor to protect MCL, patellar tendon and lateral soft tissues. After this portion of bone was removed, the knee was brought into extension and a lamina  was used to open up the extension space. What remained of the medial and lateral menisci were removed. Spacer block was placed with a T-handle drop-down wilbert to verify appropriate cut angle. Next the knee was flexed up, and the femoral sizing block with stylus was used. This sized for a size 4 femur. Pins were placed, and the sizing block removed. Pins were noted to be parallel to the epicondylar axis and perpendicular to [employer]. The 4-in-1 cutting block was placed and screwed down. Anterior distal femur was cut, demonstrating a positive grand piano sign. Posterior condyles were cut, measuring 6 on the posterior medial cut. Chamfers were then cut. The cutting block was removed, and the knee was flexed with a lamina  to open up the posterior knee joint. Notch contents and PCL were removed. Posterior condylar recesses were opened, and posterior osteophytes chiseled out. The knee was brought into extension, and sized for a tibial baseplate. The knee was noted to be tight on the lateral side and extension more than flexion. Release was performed of the lateral capsule in full extension, from just anterior to the popliteus up until the IT band. Additionally 3 stab incisions pie crusting the IT band was performed. Following this, trials were then placed. With the 4 femur, the 4 tibia, and a 10 polyethylene liner, the knee came to full extension with excellent varus stability, less than half a millimeter valgus laxity. In mid flexion, there was 2 to 3 mm varus laxity, 2 mm valgus laxity, Lachman's translating. At 90 degrees, anterior drawer was 5 mm translating. Gravity flexion was to 110 with the patella tracking centrally. The tibial component rotation was marked, and the tibial trial removed. The femoral lugs were drilled, and the anterior chamfer finishing guide was placed to cut the finishing anterior chamfer cut. The knee was then flexed, and the tibia brought forward to place the tibial baseplate trial.  This was pinned in place, and the drill and keel punch were used to prepare the proximal tibia. The knee was then brought into extension, the patella everted, and it was freehand cut from a thickness of 21 mm and cut to 13 mm. Next final components were opened, bony surfaces were washed and dried, and periarticular pain cocktail was injected with 30 mL in the posterior capsule, 10 mL into lateral femoral and tibial periosteum, 10 mL into medial femoral and tibial periosteum, and 10 mL into extensor mechanism. Cement was then mixed.   The tibial component was cemented along with the proximal tibial bone surface, the component was impacted into place followed by removal of excess cement. The femur was reduced onto the tibia, and cement was placed on the cut surface of the femur. The cemented femoral component was then impacted down, with removal of excess cement. The femur was left up 1.5 mm on the medial side. The knee was brought into full extension and an axial load was held across the joint. The patella was cemented along with the button, and this was compressed and held in place. Once cement had hardened, the knee was trialed again. Optimal stability was achieved with a 11 mm polyethylene liner, with exam demonstrating full extension with excellent varus valgus stability. In mid flexion, there was 2 to 3 mm varus laxity, 1 to 2 mm valgus laxity with hard stress, Lachman's stable translating less than 5 mm. At 90 degrees, anterior drawer was largely translating/slightly rotating around the medial side less than 5 mm. Gravity flexion was to 110, easily got to 120 with gentle pressure, with the patella tracking centrally. The trial poly was removed, the knee was washed out, any loose bodies removed, and final poly was placed. Betadine lavage was performed. Closure was performed with 5 Ethibond for the arthrotomy, 2-0 Vicryl for skin, followed by staples. A sterile dressing was applied, and the patient was brought to PACU in good condition.   Anesthesia: Spinal plus adductor canal block  Complications: None  Specimens: None  Blood loss: 25 mL  Fluids: See anesthesia report  Implants: Medacta GM K sphere 4 femur, 4 tibia, 11 poly-, 2 patella  Drains: None  Condition: Good  Disposition: Floor    -Weightbearing as tolerated, PT OT  -DVT prophylaxis mechanical plus aspirin twice daily  -Pain control with oral meds  -Perioperative Adina Boykin MD, [address] Orthopedic Surgery  Phone [phone]  Fax [phone]

## 2023-05-23 NOTE — PLAN OF CARE
A&Ox4. VSS. On room air. . IS encouraged. SCDs on BLE. Ankle pumps encouraged. Tolerating regular diet. Last BM 5/22. Voiding freely. Pain controlled. Dressing to right knee, C/D/I. Ambulating with minimal assist. Plan is to work with PT/OT. Patient updated on plan of care. Safety precautions in place. Call light within reach.

## 2023-05-23 NOTE — ANESTHESIA PROCEDURE NOTES
Regional Block    Performed by: Jenaro Walton DO  Authorized by: Jenaro Walton DO      General Information and Staff    Start Time:   Anesthesiologist:  Jenaro Walton DO  Performed by: Anesthesiologist  Patient Location:  OR      Site Identification: real time ultrasound guided and image stored and retrievable    Block site/laterality marked before start: site marked  Reason for Block: at surgeon's request and post-op pain management    Preanesthetic Checklist: 2 patient identifers, IV checked, risks and benefits discussed, monitors and equipment checked, pre-op evaluation, timeout performed, anesthesia consent, sterile technique used, no prohibitive neurological deficits and no local skin infection at insertion site      Procedure Details    Patient Position:   Prep: ChloraPrep    Monitoring:  Cardiac monitor, continuous pulse ox and blood pressure cuff  Laterality:  Right  Injection Technique:  Single-shot    Needle    Needle Type:  Short-bevel and echogenic  Needle Localization:  Ultrasound guidance  Reason for Ultrasound Use: appropriate spread of the medication was noted in real time and no ultrasound evidence of intravascular and/or intraneural injection            Assessment    Injection Assessment:  Good spread noted, negative resistance, negative aspiration for heme, incremental injection and low pressure  Heart Rate Change: No    - Patient tolerated block procedure well without evidence of immediate block related complications.      Medications      Additional Comments    Medication:  Ropivacaine 0.375% 20mL with 2mg PF dexamethasone and 150 mcg buprenorphine

## 2023-05-23 NOTE — INTERVAL H&P NOTE
Pre-op Diagnosis: Primary osteoarthritis of right knee [M17.11]    The above referenced H&P was reviewed by Balbir Rosales MD on 5/23/2023, the patient was examined and no significant changes have occurred in the patient's condition since the H&P was performed. I discussed with the patient and/or legal representative the potential benefits, risks and side effects of this procedure; the likelihood of the patient achieving goals; and potential problems that might occur during recuperation. I discussed reasonable alternatives to the procedure, including risks, benefits and side effects related to the alternatives and risks related to not receiving this procedure. We will proceed with procedure as planned.

## 2023-05-24 ENCOUNTER — ORDER TRANSCRIPTION (OUTPATIENT)
Dept: PHYSICAL THERAPY | Facility: HOSPITAL | Age: 83
End: 2023-05-24

## 2023-05-24 VITALS
BODY MASS INDEX: 30.62 KG/M2 | RESPIRATION RATE: 18 BRPM | OXYGEN SATURATION: 100 % | WEIGHT: 166.38 LBS | HEIGHT: 62 IN | HEART RATE: 65 BPM | SYSTOLIC BLOOD PRESSURE: 113 MMHG | TEMPERATURE: 98 F | DIASTOLIC BLOOD PRESSURE: 73 MMHG

## 2023-05-24 DIAGNOSIS — Z96.651 STATUS POST TOTAL RIGHT KNEE REPLACEMENT: Primary | ICD-10-CM

## 2023-05-24 PROCEDURE — 99024 POSTOP FOLLOW-UP VISIT: CPT | Performed by: PHYSICIAN ASSISTANT

## 2023-05-24 PROCEDURE — 99232 SBSQ HOSP IP/OBS MODERATE 35: CPT | Performed by: INTERNAL MEDICINE

## 2023-05-24 NOTE — PROGRESS NOTES
[facility] Progress Note    Subjective: Doing very well, just saw support pet. Tolerating diet, voiding. Pain well controlled on oral meds. Ready to go home. Cleared PT/OT. Objective: Seated comfortably in hospital chair. Alert and oriented. Dressing C/D/I. Firing right EHL, TA.      Assessment/Plan: 80year old female postop day #1 status post right total knee arthroplasty.    -Weightbearing as tolerated, PT OT  -DVT prophylaxis mechanical plus aspirin twice daily  -Pain control with oral meds  Disposition: Home today    Reginald Rodríguez, [facility]  [address] [place],[address] Orthopedic Surgery  Phone [phone]  Fax [phone]

## 2023-05-24 NOTE — PROGRESS NOTES
Pt discharged home with daughter in law at this time. Scripts filled by our pharmacy to pt. She has all belongings. Taken via w/c to lobby by PCT at this time.

## 2023-05-24 NOTE — PHYSICAL THERAPY NOTE
PHYSICAL THERAPY TREATMENT NOTE - INPATIENT    Room Number: 357/357-A     Session: 1     Number of Visits to Meet Established Goals: 3    Presenting Problem: R TKA  Co-Morbidities : OA  ASSESSMENT     Pt continues to present with impaired strength and decreased ROM R knee  , decreased endurance and impaired balance below PLOF s/p R TKA  . Pt will continue to benefit from ongoing IP PT to maximize functional independence. The AM-PAC '6-Clicks' Inpatient Basic Mobility Short Form was completed and this patient is demonstrating a 21% degree of impairment in mobility. Research supports that patients with this level of impairment may benefit from [address]  . DISCHARGE RECOMMENDATIONS  PT Discharge Recommendations: Home with home health PT     PLAN  PT Treatment Plan: Bed mobility; Body mechanics; Patient education; Family education;Gait training;Range of motion;Stoop training;Stair training;Transfer training;Balance training  Rehab Potential : Good  Frequency (Obs): Daily    CURRENT GOALS        Goal #1  Patient is able to demonstrate supine - sit EOB @ level: supervision      Goal #2  Patient is able to demonstrate transfers Sit to/from Stand at assistance level: supervison         Goal #3     Patient is able to ambulate 150 feet with assistive device at assistance level: supervision   Goal #4     Patient will negotiate 4 stairs/one curb w/ assistive device and supervision   Goal #5     Patient verbalizes and/or demonstrates all precautions and safety concerns independently    Goal #6        Goal Comments: Goals established on 2023 all goals achieved       SUBJECTIVE  \"I was exercising at our [employer] house before the surgery\"     OBJECTIVE       WEIGHT BEARING RESTRICTION  Weight Bearing Restriction: None                PAIN ASSESSMENT   Ratin          BALANCE                                                                                                                       Static Sitting: Good  Dynamic Sitting: Good           Static Standing: Fair -  Dynamic Standing: Fair -    ACTIVITY TOLERANCE                         O2 WALK         AM-PAC '6-Clicks' INPATIENT SHORT FORM - BASIC MOBILITY  How much difficulty does the patient currently have. .. Patient Difficulty: Turning over in bed (including adjusting bedclothes, sheets and blankets)?: None   Patient Difficulty: Sitting down on and standing up from a chair with arms (e.g., wheelchair, bedside commode, etc.): None   Patient Difficulty: Moving from lying on back to sitting on the side of the bed?: None   How much help from another person does the patient currently need. .. Help from Another: Moving to and from a bed to a chair (including a wheelchair)?: None   Help from Another: Need to walk in hospital room?: A Little   Help from Another: Climbing 3-5 steps with a railing?: A Little       AM-PAC Score:  Raw Score: 22   Approx Degree of Impairment: 20.91%   Standardized Score (AM-PAC Scale): 53.28   CMS Modifier (G-Code): CJ    FUNCTIONAL ABILITY STATUS  Gait Assessment   Functional Mobility/Gait Assessment  Gait Assistance: Supervision  Distance (ft): 75,150  Assistive Device: Rolling walker  Pattern: R Decreased stance time  Stairs: Stairs;Stoop/curb; Car transfer  How Many Stairs: 4  Device: 2 Rails  Assist: Supervision  Pattern: Ascend and Descend  Ascend and Descend : Step to  Stoop/Curb: CGA  Car transfer: supervision    Skilled Therapy Provided  Pt presents seated EOB . Significance of achieving good ROM in a timely fashion explained. Pt performed seated and standing therex per TKA protocol. Pt gait trained c RW cues for reciprocal gait pattern, WBAT and proper integration of RW with good return demo. Pt t/f trained as noted above c cues for sequencing. Pt has been vended  RW for home use. Pt left in chair, needs met. All questions and concerns addressed.         Bed Mobility:  Rolling: NT   Supine<>Sit: mod I    Sit<>Supine: NT Transfer Mobility:  Sit<>Stand: mod I    Stand<>Sit: mod I    Gait: supervision to mod I c RW         THERAPEUTIC EXERCISES  Lower Extremity Ankle pumps  Hip AB/AD  Heel raises  Heel slides  Knee extension  SLR  HS curl     Upper Extremity      Position Sitting & Standing     Repetitions   10-15   Sets   1     Patient End of Session: Up in chair;Needs met;Call light within reach;RN aware of session/findings; All patient questions and concerns addressed;SCDs in place; Ice applied; Alarm set    PT Session Time: 38 minutes  Gait Training: 15 minutes  Therapeutic Activity: 15 minutes  Therapeutic Exercise: 8 minutes   Neuromuscular Re-education:  minutes

## 2023-05-24 NOTE — PLAN OF CARE
Pt A & O x4, on RA. /IS. SCDs. Regular diet. Last BM 5/22. WBAT. Up x min assist/walker. Pt cleared PT/OT. Aquacel dressing CDI. Spanda . Ice wrap. Pain controlled by scheduled tylenol/tramadol. Pt is set up with [facility] for DC. Scripts to be filled by our pharmacy to pt. DC later after Dr. Kareem Figueroa sees pt.

## 2023-05-24 NOTE — PROGRESS NOTES
AVS reviewed, Discharge video viewed, IV dc'd, will dc [facility] once ride arrives & meds are delivered.

## 2023-05-24 NOTE — PLAN OF CARE
Patient A & O x4. VSS, on RA. Denies pain at this time. Dressing to knee has small amount of drainage noted. Compression sleeve in place. Gel ice wrap. Voiding freely. Up min assist with walker and gait belt. Safety measures in place. Instructed to use call light.

## 2023-05-25 ENCOUNTER — TELEPHONE (OUTPATIENT)
Dept: PHYSICAL THERAPY | Facility: HOSPITAL | Age: 83
End: 2023-05-25

## 2023-05-25 ENCOUNTER — PATIENT OUTREACH (OUTPATIENT)
Dept: CASE MANAGEMENT | Age: 83
End: 2023-05-25

## 2023-05-29 ENCOUNTER — HOSPITAL ENCOUNTER (INPATIENT)
Facility: HOSPITAL | Age: 83
LOS: 5 days | Discharge: HOME | DRG: 378 | End: 2023-06-03
Attending: EMERGENCY MEDICINE | Admitting: STUDENT IN AN ORGANIZED HEALTH CARE EDUCATION/TRAINING PROGRAM
Payer: MEDICARE

## 2023-05-29 ENCOUNTER — HOSPITAL ENCOUNTER (INPATIENT)
Facility: HOSPITAL | Age: 83
LOS: 5 days | Discharge: HOME HEALTH | DRG: 378 | End: 2023-06-03
Attending: EMERGENCY MEDICINE | Admitting: STUDENT IN AN ORGANIZED HEALTH CARE EDUCATION/TRAINING PROGRAM
Payer: MEDICARE

## 2023-05-29 DIAGNOSIS — K92.2 GI BLEED: ICD-10-CM

## 2023-05-29 DIAGNOSIS — K92.2 LOWER GI BLEED: Primary | ICD-10-CM

## 2023-05-29 LAB
ALBUMIN SERPL-MCNC: 2.6 G/DL (ref 3.4–5)
ALBUMIN/GLOB SERPL: 0.8 {RATIO} (ref 1–2)
ALP LIVER SERPL-CCNC: 53 U/L
ALT SERPL-CCNC: 21 U/L
ANION GAP SERPL CALC-SCNC: 6 MMOL/L (ref 0–18)
ANTIBODY SCREEN: NEGATIVE
APTT PPP: 30.1 SECONDS (ref 23.3–35.6)
AST SERPL-CCNC: 17 U/L (ref 15–37)
BASOPHILS # BLD AUTO: 0.03 X10(3) UL (ref 0–0.2)
BASOPHILS NFR BLD AUTO: 0.3 %
BILIRUB SERPL-MCNC: 1 MG/DL (ref 0.1–2)
BUN BLD-MCNC: 32 MG/DL (ref 7–18)
CALCIUM BLD-MCNC: 8.5 MG/DL (ref 8.5–10.1)
CHLORIDE SERPL-SCNC: 105 MMOL/L (ref 98–112)
CO2 SERPL-SCNC: 26 MMOL/L (ref 21–32)
CREAT BLD-MCNC: 0.74 MG/DL
EOSINOPHIL # BLD AUTO: 0.25 X10(3) UL (ref 0–0.7)
EOSINOPHIL NFR BLD AUTO: 2.3 %
ERYTHROCYTE [DISTWIDTH] IN BLOOD BY AUTOMATED COUNT: 14 %
GFR SERPLBLD BASED ON 1.73 SQ M-ARVRAT: 80 ML/MIN/1.73M2 (ref 60–?)
GLOBULIN PLAS-MCNC: 3.4 G/DL (ref 2.8–4.4)
GLUCOSE BLD-MCNC: 162 MG/DL (ref 70–99)
HCT VFR BLD AUTO: 21.5 %
HCT VFR BLD AUTO: 24.5 %
HGB BLD-MCNC: 6.8 G/DL
HGB BLD-MCNC: 7.9 G/DL
IMM GRANULOCYTES # BLD AUTO: 0.08 X10(3) UL (ref 0–1)
IMM GRANULOCYTES NFR BLD: 0.7 %
INR BLD: 1.1 (ref 0.85–1.16)
LIPASE SERPL-CCNC: 18 U/L (ref 13–75)
LYMPHOCYTES # BLD AUTO: 1.35 X10(3) UL (ref 1–4)
LYMPHOCYTES NFR BLD AUTO: 12.4 %
MCH RBC QN AUTO: 28.2 PG (ref 26–34)
MCHC RBC AUTO-ENTMCNC: 32.2 G/DL (ref 31–37)
MCV RBC AUTO: 87.5 FL
MONOCYTES # BLD AUTO: 0.75 X10(3) UL (ref 0.1–1)
MONOCYTES NFR BLD AUTO: 6.9 %
NEUTROPHILS # BLD AUTO: 8.46 X10 (3) UL (ref 1.5–7.7)
NEUTROPHILS # BLD AUTO: 8.46 X10(3) UL (ref 1.5–7.7)
NEUTROPHILS NFR BLD AUTO: 77.4 %
OSMOLALITY SERPL CALC.SUM OF ELEC: 294 MOSM/KG (ref 275–295)
PLATELET # BLD AUTO: 250 10(3)UL (ref 150–450)
POTASSIUM SERPL-SCNC: 4.6 MMOL/L (ref 3.5–5.1)
PROT SERPL-MCNC: 6 G/DL (ref 6.4–8.2)
PROTHROMBIN TIME: 14.2 SECONDS (ref 11.6–14.8)
RBC # BLD AUTO: 2.8 X10(6)UL
RH BLOOD TYPE: POSITIVE
SARS-COV-2 RNA RESP QL NAA+PROBE: NOT DETECTED
SODIUM SERPL-SCNC: 137 MMOL/L (ref 136–145)
TROPONIN I HIGH SENSITIVITY: 10 NG/L
WBC # BLD AUTO: 10.9 X10(3) UL (ref 4–11)

## 2023-05-29 PROCEDURE — 30233N1 TRANSFUSION OF NONAUTOLOGOUS RED BLOOD CELLS INTO PERIPHERAL VEIN, PERCUTANEOUS APPROACH: ICD-10-PCS | Performed by: STUDENT IN AN ORGANIZED HEALTH CARE EDUCATION/TRAINING PROGRAM

## 2023-05-29 PROCEDURE — 99223 1ST HOSP IP/OBS HIGH 75: CPT | Performed by: STUDENT IN AN ORGANIZED HEALTH CARE EDUCATION/TRAINING PROGRAM

## 2023-05-29 RX ORDER — MORPHINE SULFATE 2 MG/ML
1 INJECTION, SOLUTION INTRAMUSCULAR; INTRAVENOUS EVERY 2 HOUR PRN
Status: DISCONTINUED | OUTPATIENT
Start: 2023-05-29 | End: 2023-06-03

## 2023-05-29 RX ORDER — TRAMADOL HYDROCHLORIDE 50 MG/1
50 TABLET ORAL EVERY 12 HOURS SCHEDULED
Status: DISCONTINUED | OUTPATIENT
Start: 2023-05-29 | End: 2023-06-03

## 2023-05-29 RX ORDER — ECHINACEA PURPUREA EXTRACT 125 MG
1 TABLET ORAL
Status: DISCONTINUED | OUTPATIENT
Start: 2023-05-29 | End: 2023-06-03

## 2023-05-29 RX ORDER — SERTRALINE HYDROCHLORIDE 100 MG/1
100 TABLET, FILM COATED ORAL DAILY
Status: DISCONTINUED | OUTPATIENT
Start: 2023-05-29 | End: 2023-06-03

## 2023-05-29 RX ORDER — ONDANSETRON 2 MG/ML
4 INJECTION INTRAMUSCULAR; INTRAVENOUS EVERY 6 HOURS PRN
Status: DISCONTINUED | OUTPATIENT
Start: 2023-05-29 | End: 2023-06-03

## 2023-05-29 RX ORDER — ACETAMINOPHEN 500 MG
500 TABLET ORAL EVERY 4 HOURS PRN
Status: DISCONTINUED | OUTPATIENT
Start: 2023-05-29 | End: 2023-06-03

## 2023-05-29 RX ORDER — POLYETHYLENE GLYCOL 3350 17 G/17G
17 POWDER, FOR SOLUTION ORAL DAILY PRN
Status: DISCONTINUED | OUTPATIENT
Start: 2023-05-29 | End: 2023-06-03

## 2023-05-29 RX ORDER — METOCLOPRAMIDE HYDROCHLORIDE 5 MG/ML
10 INJECTION INTRAMUSCULAR; INTRAVENOUS EVERY 8 HOURS PRN
Status: DISCONTINUED | OUTPATIENT
Start: 2023-05-29 | End: 2023-06-03

## 2023-05-29 RX ORDER — ENEMA 19; 7 G/133ML; G/133ML
1 ENEMA RECTAL ONCE AS NEEDED
Status: DISCONTINUED | OUTPATIENT
Start: 2023-05-29 | End: 2023-06-03

## 2023-05-29 RX ORDER — ONDANSETRON 2 MG/ML
4 INJECTION INTRAMUSCULAR; INTRAVENOUS EVERY 4 HOURS PRN
Status: ACTIVE | OUTPATIENT
Start: 2023-05-29 | End: 2023-05-30

## 2023-05-29 RX ORDER — SODIUM CHLORIDE 9 MG/ML
INJECTION, SOLUTION INTRAVENOUS CONTINUOUS
Status: ACTIVE | OUTPATIENT
Start: 2023-05-29 | End: 2023-05-30

## 2023-05-29 RX ORDER — ONDANSETRON 2 MG/ML
INJECTION INTRAMUSCULAR; INTRAVENOUS
Status: COMPLETED
Start: 2023-05-29 | End: 2023-05-29

## 2023-05-29 RX ORDER — SODIUM CHLORIDE 9 MG/ML
INJECTION, SOLUTION INTRAVENOUS CONTINUOUS
Status: DISCONTINUED | OUTPATIENT
Start: 2023-05-29 | End: 2023-06-01

## 2023-05-29 RX ORDER — MORPHINE SULFATE 2 MG/ML
2 INJECTION, SOLUTION INTRAMUSCULAR; INTRAVENOUS EVERY 2 HOUR PRN
Status: DISCONTINUED | OUTPATIENT
Start: 2023-05-29 | End: 2023-06-03

## 2023-05-29 RX ORDER — SENNOSIDES 8.6 MG
17.2 TABLET ORAL NIGHTLY PRN
Status: DISCONTINUED | OUTPATIENT
Start: 2023-05-29 | End: 2023-06-03

## 2023-05-29 RX ORDER — MORPHINE SULFATE 4 MG/ML
4 INJECTION, SOLUTION INTRAMUSCULAR; INTRAVENOUS EVERY 2 HOUR PRN
Status: DISCONTINUED | OUTPATIENT
Start: 2023-05-29 | End: 2023-06-03

## 2023-05-29 RX ORDER — CALCIUM CARBONATE 500 MG/1
500 TABLET, CHEWABLE ORAL 3 TIMES DAILY PRN
Status: DISCONTINUED | OUTPATIENT
Start: 2023-05-29 | End: 2023-06-03

## 2023-05-29 RX ORDER — BISACODYL 10 MG
10 SUPPOSITORY, RECTAL RECTAL
Status: DISCONTINUED | OUTPATIENT
Start: 2023-05-29 | End: 2023-06-03

## 2023-05-29 RX ORDER — PRAVASTATIN SODIUM 20 MG
20 TABLET ORAL NIGHTLY
Status: DISCONTINUED | OUTPATIENT
Start: 2023-05-29 | End: 2023-06-03

## 2023-05-30 ENCOUNTER — ANESTHESIA (OUTPATIENT)
Dept: ENDOSCOPY | Facility: HOSPITAL | Age: 83
DRG: 378 | End: 2023-05-30
Payer: MEDICARE

## 2023-05-30 ENCOUNTER — ANESTHESIA EVENT (OUTPATIENT)
Dept: ENDOSCOPY | Facility: HOSPITAL | Age: 83
DRG: 378 | End: 2023-05-30
Payer: MEDICARE

## 2023-05-30 ENCOUNTER — TELEPHONE (OUTPATIENT)
Dept: ORTHOPEDICS CLINIC | Facility: CLINIC | Age: 83
End: 2023-05-30

## 2023-05-30 DIAGNOSIS — K92.2 GI BLEED: Primary | ICD-10-CM

## 2023-05-30 LAB
ANION GAP SERPL CALC-SCNC: 4 MMOL/L (ref 0–18)
ATRIAL RATE: 70 BPM
BUN BLD-MCNC: 24 MG/DL (ref 7–18)
C DIFF TOX B STL QL: NEGATIVE
CALCIUM BLD-MCNC: 7.7 MG/DL (ref 8.5–10.1)
CHLORIDE SERPL-SCNC: 110 MMOL/L (ref 98–112)
CO2 SERPL-SCNC: 24 MMOL/L (ref 21–32)
CREAT BLD-MCNC: 0.48 MG/DL
GFR SERPLBLD BASED ON 1.73 SQ M-ARVRAT: 94 ML/MIN/1.73M2 (ref 60–?)
GLUCOSE BLD-MCNC: 134 MG/DL (ref 70–99)
HCT VFR BLD AUTO: 21.6 %
HCT VFR BLD AUTO: 22.6 %
HCT VFR BLD AUTO: 23.8 %
HGB BLD-MCNC: 7.3 G/DL
HGB BLD-MCNC: 7.5 G/DL
HGB BLD-MCNC: 7.7 G/DL
OSMOLALITY SERPL CALC.SUM OF ELEC: 292 MOSM/KG (ref 275–295)
P AXIS: -10 DEGREES
P-R INTERVAL: 152 MS
POTASSIUM SERPL-SCNC: 4.4 MMOL/L (ref 3.5–5.1)
Q-T INTERVAL: 406 MS
QRS DURATION: 78 MS
QTC CALCULATION (BEZET): 438 MS
R AXIS: -4 DEGREES
SODIUM SERPL-SCNC: 138 MMOL/L (ref 136–145)
T AXIS: 43 DEGREES
VENTRICULAR RATE: 70 BPM

## 2023-05-30 PROCEDURE — 0DJD8ZZ INSPECTION OF LOWER INTESTINAL TRACT, VIA NATURAL OR ARTIFICIAL OPENING ENDOSCOPIC: ICD-10-PCS | Performed by: STUDENT IN AN ORGANIZED HEALTH CARE EDUCATION/TRAINING PROGRAM

## 2023-05-30 PROCEDURE — 0W3P8ZZ CONTROL BLEEDING IN GASTROINTESTINAL TRACT, VIA NATURAL OR ARTIFICIAL OPENING ENDOSCOPIC: ICD-10-PCS | Performed by: STUDENT IN AN ORGANIZED HEALTH CARE EDUCATION/TRAINING PROGRAM

## 2023-05-30 PROCEDURE — 3E0G8GC INTRODUCTION OF OTHER THERAPEUTIC SUBSTANCE INTO UPPER GI, VIA NATURAL OR ARTIFICIAL OPENING ENDOSCOPIC: ICD-10-PCS | Performed by: STUDENT IN AN ORGANIZED HEALTH CARE EDUCATION/TRAINING PROGRAM

## 2023-05-30 PROCEDURE — 99233 SBSQ HOSP IP/OBS HIGH 50: CPT | Performed by: HOSPITALIST

## 2023-05-30 PROCEDURE — 0DB68ZX EXCISION OF STOMACH, VIA NATURAL OR ARTIFICIAL OPENING ENDOSCOPIC, DIAGNOSTIC: ICD-10-PCS | Performed by: STUDENT IN AN ORGANIZED HEALTH CARE EDUCATION/TRAINING PROGRAM

## 2023-05-30 PROCEDURE — 0DB98ZX EXCISION OF DUODENUM, VIA NATURAL OR ARTIFICIAL OPENING ENDOSCOPIC, DIAGNOSTIC: ICD-10-PCS | Performed by: STUDENT IN AN ORGANIZED HEALTH CARE EDUCATION/TRAINING PROGRAM

## 2023-05-30 RX ORDER — SODIUM CHLORIDE, SODIUM LACTATE, POTASSIUM CHLORIDE, CALCIUM CHLORIDE 600; 310; 30; 20 MG/100ML; MG/100ML; MG/100ML; MG/100ML
INJECTION, SOLUTION INTRAVENOUS CONTINUOUS PRN
Status: DISCONTINUED | OUTPATIENT
Start: 2023-05-30 | End: 2023-05-30 | Stop reason: SURG

## 2023-05-30 RX ORDER — LIDOCAINE HYDROCHLORIDE 10 MG/ML
INJECTION, SOLUTION EPIDURAL; INFILTRATION; INTRACAUDAL; PERINEURAL AS NEEDED
Status: DISCONTINUED | OUTPATIENT
Start: 2023-05-30 | End: 2023-05-30 | Stop reason: SURG

## 2023-05-30 RX ORDER — SODIUM CHLORIDE 9 MG/ML
500 INJECTION, SOLUTION INTRAVENOUS ONCE
Status: COMPLETED | OUTPATIENT
Start: 2023-05-30 | End: 2023-05-30

## 2023-05-30 RX ORDER — ONDANSETRON 2 MG/ML
4 INJECTION INTRAMUSCULAR; INTRAVENOUS AS NEEDED
Status: DISCONTINUED | OUTPATIENT
Start: 2023-05-30 | End: 2023-05-30 | Stop reason: HOSPADM

## 2023-05-30 RX ORDER — PHENYLEPHRINE HCL 10 MG/ML
VIAL (ML) INJECTION AS NEEDED
Status: DISCONTINUED | OUTPATIENT
Start: 2023-05-30 | End: 2023-05-30 | Stop reason: SURG

## 2023-05-30 RX ORDER — SODIUM CHLORIDE, SODIUM LACTATE, POTASSIUM CHLORIDE, CALCIUM CHLORIDE 600; 310; 30; 20 MG/100ML; MG/100ML; MG/100ML; MG/100ML
INJECTION, SOLUTION INTRAVENOUS CONTINUOUS
Status: DISCONTINUED | OUTPATIENT
Start: 2023-05-30 | End: 2023-06-01

## 2023-05-30 RX ORDER — SERTRALINE HYDROCHLORIDE 100 MG/1
100 TABLET, FILM COATED ORAL DAILY
Qty: 90 TABLET | Refills: 0 | OUTPATIENT
Start: 2023-05-30 | End: 2023-06-03

## 2023-05-30 RX ORDER — SODIUM CHLORIDE 9 MG/ML
INJECTION, SOLUTION INTRAVENOUS ONCE
Status: COMPLETED | OUTPATIENT
Start: 2023-05-30 | End: 2023-05-30

## 2023-05-30 RX ORDER — NALOXONE HYDROCHLORIDE 0.4 MG/ML
80 INJECTION, SOLUTION INTRAMUSCULAR; INTRAVENOUS; SUBCUTANEOUS AS NEEDED
Status: DISCONTINUED | OUTPATIENT
Start: 2023-05-30 | End: 2023-05-30 | Stop reason: HOSPADM

## 2023-05-30 RX ADMIN — PHENYLEPHRINE HCL 100 MCG: 10 MG/ML VIAL (ML) INJECTION at 15:22:00

## 2023-05-30 RX ADMIN — PHENYLEPHRINE HCL 100 MCG: 10 MG/ML VIAL (ML) INJECTION at 15:57:00

## 2023-05-30 RX ADMIN — PHENYLEPHRINE HCL 200 MCG: 10 MG/ML VIAL (ML) INJECTION at 15:25:00

## 2023-05-30 RX ADMIN — SODIUM CHLORIDE, SODIUM LACTATE, POTASSIUM CHLORIDE, CALCIUM CHLORIDE: 600; 310; 30; 20 INJECTION, SOLUTION INTRAVENOUS at 15:03:00

## 2023-05-30 RX ADMIN — LIDOCAINE HYDROCHLORIDE 50 MG: 10 INJECTION, SOLUTION EPIDURAL; INFILTRATION; INTRACAUDAL; PERINEURAL at 15:03:00

## 2023-05-30 RX ADMIN — PHENYLEPHRINE HCL 100 MCG: 10 MG/ML VIAL (ML) INJECTION at 15:17:00

## 2023-05-30 NOTE — ANESTHESIA POSTPROCEDURE EVALUATION
[facility] Patient Status:  Inpatient   Age/Gender 80year old female MRN [id number]   Location [address] [address] Attending Selma Calle MD   Hosp Day # 1 PCP Emil Rivers MD       Anesthesia Post-op Note    ESOPHAGOGASTRODUODENOSCOPY (EGD) with biopsies, epi injection and clip placement and Colonoscopy    Procedure Summary     Date: 05/30/23 Room / Location: [facility] ENDOSCOPY 04 / 1404 [facility] ENDOSCOPY    Anesthesia Start: 1501 Anesthesia Stop: 4822    Procedures:       ESOPHAGOGASTRODUODENOSCOPY (EGD) with biopsies, epi injection and clip placement and Colonoscopy      COLONOSCOPY Diagnosis:       GI bleed      (egd: gastric ulcers, duodenal submucosal lesions, gastritis; colon: old blood noted throughout colon and terminal ilium, sigmoid diverticulosis)    Surgeons: Angela Rodriguez DO Anesthesiologist: Jerald Brown MD    Anesthesia Type: MAC ASA Status: Not recorded          Anesthesia Type: MAC    Vitals Value Taken Time   BP 96/54 05/30/23 1615   Temp 97 05/30/23 1615   Pulse 77 05/30/23 1615   Resp 16 05/30/23 1615   SpO2 100 05/30/23 1615       Patient Location: PACU    Anesthesia Type: general    Airway Patency: patent    Postop Pain Control: adequate    Mental Status: preanesthetic baseline    Nausea/Vomiting: none    Cardiopulmonary/Hydration status: stable euvolemic    Complications: no apparent anesthesia related complications    Postop vital signs: stable    Dental Exam: Unchanged from Preop    Patient to be discharged from PACU when criteria met.

## 2023-05-30 NOTE — PLAN OF CARE
Pt resting in bed on room air. Follows commands. Complains of pain on right side of abdomen, Tramadol given. Complains of nausea, antimetics given. Rectal tube in place, leaking frequently. Reinserted and inflated. Large amounts of  Red maroon stool. GI notified of situation. Encouraged pt to drink prep multiple times. Consent obtained after Gi went over procedure. Family at bedside and updated. Systolic bp in the 50'P APN notified, 500 bolus given. 1 unit PRBC transfused. Per surgery around 1115 stop the prep. See flowsheets for more details. Came back from surgery with rectal tube out.

## 2023-05-30 NOTE — PLAN OF CARE
Received patient from ER alert and oriented x4, on RA. Pt c/o right abd/flank pain when having a bowel movement. Several large maroon colored stools. GI bedside. Order for rectal tube to be placed. Pt drinking Golytely. Received 2U PRBCs. Hgb 7.7.

## 2023-05-30 NOTE — ED QUICK NOTES
Orders for admission, patient is aware of plan and ready to go upstairs. Any questions, please call ED RN Ava at extension [phone].      Patient Covid vaccination status: Fully vaccinated     COVID Test Ordered in ED: None    COVID Suspicion at Admission: N/A    Running Infusions:   0.9NS @ gravity    Mental Status/LOC at time of transport: A&Ox3    Other pertinent information:   CIWA score: N/A   NIH score:  N/A

## 2023-05-30 NOTE — ED INITIAL ASSESSMENT (HPI)
Pt. States that she was having a bowel movement and noticed blood in her stool and when she was wiping. Also vomited x1 and c/o diarrhea. Denies abdominal pain.

## 2023-05-31 LAB
ALBUMIN SERPL-MCNC: 2.1 G/DL (ref 3.4–5)
ALBUMIN/GLOB SERPL: 0.9 {RATIO} (ref 1–2)
ALP LIVER SERPL-CCNC: 26 U/L
ALT SERPL-CCNC: 12 U/L
ANION GAP SERPL CALC-SCNC: 0 MMOL/L (ref 0–18)
AST SERPL-CCNC: 13 U/L (ref 15–37)
BASOPHILS # BLD AUTO: 0.04 X10(3) UL (ref 0–0.2)
BASOPHILS NFR BLD AUTO: 0.3 %
BILIRUB SERPL-MCNC: 0.8 MG/DL (ref 0.1–2)
BLOOD TYPE BARCODE: 5100
BLOOD TYPE BARCODE: 5100
BUN BLD-MCNC: 16 MG/DL (ref 7–18)
CALCIUM BLD-MCNC: 7.3 MG/DL (ref 8.5–10.1)
CHLORIDE SERPL-SCNC: 112 MMOL/L (ref 98–112)
CO2 SERPL-SCNC: 27 MMOL/L (ref 21–32)
CREAT BLD-MCNC: 0.55 MG/DL
EOSINOPHIL # BLD AUTO: 0.34 X10(3) UL (ref 0–0.7)
EOSINOPHIL NFR BLD AUTO: 2.7 %
ERYTHROCYTE [DISTWIDTH] IN BLOOD BY AUTOMATED COUNT: 15.4 %
GFR SERPLBLD BASED ON 1.73 SQ M-ARVRAT: 91 ML/MIN/1.73M2 (ref 60–?)
GLOBULIN PLAS-MCNC: 2.4 G/DL (ref 2.8–4.4)
GLUCOSE BLD-MCNC: 137 MG/DL (ref 70–99)
HCT VFR BLD AUTO: 19.9 %
HCT VFR BLD AUTO: 22.6 %
HCT VFR BLD AUTO: 22.6 %
HCT VFR BLD AUTO: 27.3 %
HGB BLD-MCNC: 6.5 G/DL
HGB BLD-MCNC: 7.4 G/DL
HGB BLD-MCNC: 7.4 G/DL
HGB BLD-MCNC: 9.1 G/DL
IMM GRANULOCYTES # BLD AUTO: 0.23 X10(3) UL (ref 0–1)
IMM GRANULOCYTES NFR BLD: 1.8 %
LYMPHOCYTES # BLD AUTO: 2.64 X10(3) UL (ref 1–4)
LYMPHOCYTES NFR BLD AUTO: 20.6 %
MCH RBC QN AUTO: 28.5 PG (ref 26–34)
MCHC RBC AUTO-ENTMCNC: 32.7 G/DL (ref 31–37)
MCV RBC AUTO: 87.3 FL
MONOCYTES # BLD AUTO: 1.41 X10(3) UL (ref 0.1–1)
MONOCYTES NFR BLD AUTO: 11 %
NEUTROPHILS # BLD AUTO: 8.17 X10 (3) UL (ref 1.5–7.7)
NEUTROPHILS # BLD AUTO: 8.17 X10(3) UL (ref 1.5–7.7)
NEUTROPHILS NFR BLD AUTO: 63.6 %
OSMOLALITY SERPL CALC.SUM OF ELEC: 291 MOSM/KG (ref 275–295)
PLATELET # BLD AUTO: 199 10(3)UL (ref 150–450)
POTASSIUM SERPL-SCNC: 4 MMOL/L (ref 3.5–5.1)
PROT SERPL-MCNC: 4.5 G/DL (ref 6.4–8.2)
RBC # BLD AUTO: 2.28 X10(6)UL
SODIUM SERPL-SCNC: 139 MMOL/L (ref 136–145)
UNIT VOLUME: 350 ML
UNIT VOLUME: 350 ML
WBC # BLD AUTO: 12.8 X10(3) UL (ref 4–11)

## 2023-05-31 PROCEDURE — 99233 SBSQ HOSP IP/OBS HIGH 50: CPT | Performed by: STUDENT IN AN ORGANIZED HEALTH CARE EDUCATION/TRAINING PROGRAM

## 2023-05-31 RX ORDER — SODIUM CHLORIDE 9 MG/ML
INJECTION, SOLUTION INTRAVENOUS ONCE
Status: DISCONTINUED | OUTPATIENT
Start: 2023-05-31 | End: 2023-06-03

## 2023-05-31 NOTE — PLAN OF CARE
Pt resting in bed on room air. Denies pain, follows commands. Tolerating clear liquid diet. Purewick in place. Dr. Lennox Lima and CUCO Crisostomo evaluated pt legs and new dressing placed. See flowsheet for more details. Called report to nurse receiving pt at 1450.

## 2023-05-31 NOTE — PLAN OF CARE
Pt AOx4. VSS. RA. No complain of pain N/V/D. LLE edema/ hx knee replacement. Hgb 7.4. Good appetite. Will continue plan of care. Problem: CARDIOVASCULAR - ADULT  Goal: Maintains optimal cardiac output and hemodynamic stability  Description: INTERVENTIONS:  - Monitor vital signs, rhythm, and trends  - Monitor for bleeding, hypotension and signs of decreased cardiac output  - Evaluate effectiveness of vasoactive medications to optimize hemodynamic stability  - Monitor arterial and/or venous puncture sites for bleeding and/or hematoma  - Assess quality of pulses, skin color and temperature  - Assess for signs of decreased coronary artery perfusion - ex.  Angina  - Evaluate fluid balance, assess for edema, trend weights  Outcome: Progressing     Problem: RESPIRATORY - ADULT  Goal: Achieves optimal ventilation and oxygenation  Description: INTERVENTIONS:  - Assess for changes in respiratory status  - Assess for changes in mentation and behavior  - Position to facilitate oxygenation and minimize respiratory effort  - Oxygen supplementation based on oxygen saturation or ABGs  - Provide Smoking Cessation handout, if applicable  - Encourage broncho-pulmonary hygiene including cough, deep breathe, Incentive Spirometry  - Assess the need for suctioning and perform as needed  - Assess and instruct to report SOB or any respiratory difficulty  - Respiratory Therapy support as indicated  - Manage/alleviate anxiety  - Monitor for signs/symptoms of CO2 retention  Outcome: Progressing     Problem: GASTROINTESTINAL - ADULT  Goal: Minimal or absence of nausea and vomiting  Description: INTERVENTIONS:  - Maintain adequate hydration with IV or PO as ordered and tolerated  - Nasogastric tube to low intermittent suction as ordered  - Evaluate effectiveness of ordered antiemetic medications  - Provide nonpharmacologic comfort measures as appropriate  - Advance diet as tolerated, if ordered  - Obtain nutritional consult as needed  - Evaluate fluid balance  Outcome: Progressing  Goal: Maintains or returns to baseline bowel function  Description: INTERVENTIONS:  - Assess bowel function  - Maintain adequate hydration with IV or PO as ordered and tolerated  - Evaluate effectiveness of GI medications  - Encourage mobilization and activity  - Obtain nutritional consult as needed  - Establish a toileting routine/schedule  - Consider collaborating with pharmacy to review patient's medication profile  Outcome: Progressing  Goal: Maintains adequate nutritional intake (undernourished)  Description: INTERVENTIONS:  - Monitor percentage of each meal consumed  - Identify factors contributing to decreased intake, treat as appropriate  - Assist with meals as needed  - Monitor I&O, WT and lab values  - Obtain nutritional consult as needed  - Optimize oral hygiene and moisture  - Encourage food from home; allow for food preferences  - Enhance eating environment  Outcome: Progressing     Problem: SKIN/TISSUE INTEGRITY - ADULT  Goal: Skin integrity remains intact  Description: INTERVENTIONS  - Assess and document risk factors for pressure ulcer development  - Assess and document skin integrity  - Monitor for areas of redness and/or skin breakdown  - Initiate interventions, skin care algorithm/standards of care as needed  Outcome: Progressing

## 2023-05-31 NOTE — PLAN OF CARE
Patient A&Ox4 on Ra. Afebrile. VSS. Hgb 9.1 then 6.5. No bloody bowel movements. No c/o pain or N&V. 1u PRBC started. See flowsheet for further assessment.

## 2023-06-01 LAB
ALBUMIN SERPL-MCNC: 2.3 G/DL (ref 3.4–5)
ALBUMIN/GLOB SERPL: 0.8 {RATIO} (ref 1–2)
ALP LIVER SERPL-CCNC: 34 U/L
ALT SERPL-CCNC: 15 U/L
ANION GAP SERPL CALC-SCNC: 1 MMOL/L (ref 0–18)
AST SERPL-CCNC: 18 U/L (ref 15–37)
BASOPHILS # BLD AUTO: 0.03 X10(3) UL (ref 0–0.2)
BASOPHILS NFR BLD AUTO: 0.3 %
BILIRUB SERPL-MCNC: 0.9 MG/DL (ref 0.1–2)
BLOOD TYPE BARCODE: 9500
BUN BLD-MCNC: 7 MG/DL (ref 7–18)
CALCIUM BLD-MCNC: 7.7 MG/DL (ref 8.5–10.1)
CHLORIDE SERPL-SCNC: 112 MMOL/L (ref 98–112)
CO2 SERPL-SCNC: 29 MMOL/L (ref 21–32)
CREAT BLD-MCNC: 0.55 MG/DL
EOSINOPHIL # BLD AUTO: 0.39 X10(3) UL (ref 0–0.7)
EOSINOPHIL NFR BLD AUTO: 4.1 %
ERYTHROCYTE [DISTWIDTH] IN BLOOD BY AUTOMATED COUNT: 16.6 %
GFR SERPLBLD BASED ON 1.73 SQ M-ARVRAT: 91 ML/MIN/1.73M2 (ref 60–?)
GLOBULIN PLAS-MCNC: 2.8 G/DL (ref 2.8–4.4)
GLUCOSE BLD-MCNC: 134 MG/DL (ref 70–99)
HCT VFR BLD AUTO: 22.6 %
HCT VFR BLD AUTO: 25.2 %
HGB BLD-MCNC: 7 G/DL
HGB BLD-MCNC: 7.4 G/DL
HGB BLD-MCNC: 7.4 G/DL
HGB BLD-MCNC: 8 G/DL
IMM GRANULOCYTES # BLD AUTO: 0.24 X10(3) UL (ref 0–1)
IMM GRANULOCYTES NFR BLD: 2.5 %
LYMPHOCYTES # BLD AUTO: 1.62 X10(3) UL (ref 1–4)
LYMPHOCYTES NFR BLD AUTO: 17.1 %
MCH RBC QN AUTO: 29 PG (ref 26–34)
MCHC RBC AUTO-ENTMCNC: 32.7 G/DL (ref 31–37)
MCV RBC AUTO: 88.6 FL
MONOCYTES # BLD AUTO: 0.81 X10(3) UL (ref 0.1–1)
MONOCYTES NFR BLD AUTO: 8.5 %
NEUTROPHILS # BLD AUTO: 6.41 X10 (3) UL (ref 1.5–7.7)
NEUTROPHILS # BLD AUTO: 6.41 X10(3) UL (ref 1.5–7.7)
NEUTROPHILS NFR BLD AUTO: 67.5 %
OSMOLALITY SERPL CALC.SUM OF ELEC: 294 MOSM/KG (ref 275–295)
PLATELET # BLD AUTO: 176 10(3)UL (ref 150–450)
POTASSIUM SERPL-SCNC: 4.3 MMOL/L (ref 3.5–5.1)
PROT SERPL-MCNC: 5.1 G/DL (ref 6.4–8.2)
RBC # BLD AUTO: 2.55 X10(6)UL
SODIUM SERPL-SCNC: 142 MMOL/L (ref 136–145)
UNIT VOLUME: 283 ML
WBC # BLD AUTO: 9.5 X10(3) UL (ref 4–11)

## 2023-06-01 PROCEDURE — 99222 1ST HOSP IP/OBS MODERATE 55: CPT | Performed by: ORTHOPAEDIC SURGERY

## 2023-06-01 PROCEDURE — 99232 SBSQ HOSP IP/OBS MODERATE 35: CPT | Performed by: STUDENT IN AN ORGANIZED HEALTH CARE EDUCATION/TRAINING PROGRAM

## 2023-06-01 NOTE — CM/SW NOTE
SW noted that patient is current with [facility] for home RN and PT. SW placed resume of care orders due to patient being inpatient status. SW will continue to follow for plan of care changes and remain available for any additional DC needs or concerns.      Tara Alva MSW, LSW  Discharge Planner   [phone]

## 2023-06-01 NOTE — PLAN OF CARE
Patient alert and oriented x4. On room air. Vital signs stable. Afebrile. No complaints of pain at this time. Expresses anxiety over plan of care, d/w physician. H&H q8. Last at 0616 Hgb 7.4 Hematocrit 22.6 Continent x2. Up with walker. Safety precautions in place. Call light within reach. Will continue to monitor. Problem: CARDIOVASCULAR - ADULT  Goal: Maintains optimal cardiac output and hemodynamic stability  Description: INTERVENTIONS:  - Monitor vital signs, rhythm, and trends  - Monitor for bleeding, hypotension and signs of decreased cardiac output  - Evaluate effectiveness of vasoactive medications to optimize hemodynamic stability  - Monitor arterial and/or venous puncture sites for bleeding and/or hematoma  - Assess quality of pulses, skin color and temperature  - Assess for signs of decreased coronary artery perfusion - ex.  Angina  - Evaluate fluid balance, assess for edema, trend weights  Outcome: Progressing     Problem: RESPIRATORY - ADULT  Goal: Achieves optimal ventilation and oxygenation  Description: INTERVENTIONS:  - Assess for changes in respiratory status  - Assess for changes in mentation and behavior  - Position to facilitate oxygenation and minimize respiratory effort  - Oxygen supplementation based on oxygen saturation or ABGs  - Provide Smoking Cessation handout, if applicable  - Encourage broncho-pulmonary hygiene including cough, deep breathe, Incentive Spirometry  - Assess the need for suctioning and perform as needed  - Assess and instruct to report SOB or any respiratory difficulty  - Respiratory Therapy support as indicated  - Manage/alleviate anxiety  - Monitor for signs/symptoms of CO2 retention  Outcome: Progressing     Problem: GASTROINTESTINAL - ADULT  Goal: Minimal or absence of nausea and vomiting  Description: INTERVENTIONS:  - Maintain adequate hydration with IV or PO as ordered and tolerated  - Nasogastric tube to low intermittent suction as ordered  - Evaluate effectiveness of ordered antiemetic medications  - Provide nonpharmacologic comfort measures as appropriate  - Advance diet as tolerated, if ordered  - Obtain nutritional consult as needed  - Evaluate fluid balance  Outcome: Progressing  Goal: Maintains or returns to baseline bowel function  Description: INTERVENTIONS:  - Assess bowel function  - Maintain adequate hydration with IV or PO as ordered and tolerated  - Evaluate effectiveness of GI medications  - Encourage mobilization and activity  - Obtain nutritional consult as needed  - Establish a toileting routine/schedule  - Consider collaborating with pharmacy to review patient's medication profile  Outcome: Progressing  Goal: Maintains adequate nutritional intake (undernourished)  Description: INTERVENTIONS:  - Monitor percentage of each meal consumed  - Identify factors contributing to decreased intake, treat as appropriate  - Assist with meals as needed  - Monitor I&O, WT and lab values  - Obtain nutritional consult as needed  - Optimize oral hygiene and moisture  - Encourage food from home; allow for food preferences  - Enhance eating environment  Outcome: Progressing     Problem: SKIN/TISSUE INTEGRITY - ADULT  Goal: Skin integrity remains intact  Description: INTERVENTIONS  - Assess and document risk factors for pressure ulcer development  - Assess and document skin integrity  - Monitor for areas of redness and/or skin breakdown  - Initiate interventions, skin care algorithm/standards of care as needed  Outcome: Progressing

## 2023-06-01 NOTE — PLAN OF CARE
Pt a/o x4. VSS on RA. No c/o pain at the moment. Meds per MAR. IVF per order. 1x [place] recorded. Hgb q8h. Latest hgb 7.4 this AM. LLE w/ bruising & edema. Pt reports missing walker. ICU called to investigate. Fall risk protocol followed, call light within reach.

## 2023-06-02 LAB
ALBUMIN SERPL-MCNC: 2.4 G/DL (ref 3.4–5)
ALBUMIN/GLOB SERPL: 0.8 {RATIO} (ref 1–2)
ALP LIVER SERPL-CCNC: 47 U/L
ALT SERPL-CCNC: 16 U/L
ANION GAP SERPL CALC-SCNC: 7 MMOL/L (ref 0–18)
AST SERPL-CCNC: 20 U/L (ref 15–37)
BASOPHILS # BLD AUTO: 0.02 X10(3) UL (ref 0–0.2)
BASOPHILS NFR BLD AUTO: 0.2 %
BILIRUB SERPL-MCNC: 0.9 MG/DL (ref 0.1–2)
BUN BLD-MCNC: 7 MG/DL (ref 7–18)
CALCIUM BLD-MCNC: 8 MG/DL (ref 8.5–10.1)
CHLORIDE SERPL-SCNC: 109 MMOL/L (ref 98–112)
CO2 SERPL-SCNC: 27 MMOL/L (ref 21–32)
CREAT BLD-MCNC: 0.63 MG/DL
EOSINOPHIL # BLD AUTO: 0.24 X10(3) UL (ref 0–0.7)
EOSINOPHIL NFR BLD AUTO: 2.7 %
ERYTHROCYTE [DISTWIDTH] IN BLOOD BY AUTOMATED COUNT: 17.7 %
GFR SERPLBLD BASED ON 1.73 SQ M-ARVRAT: 88 ML/MIN/1.73M2 (ref 60–?)
GLOBULIN PLAS-MCNC: 3 G/DL (ref 2.8–4.4)
GLUCOSE BLD-MCNC: 148 MG/DL (ref 70–99)
HCT VFR BLD AUTO: 22.5 %
HCT VFR BLD AUTO: 23.7 %
HCT VFR BLD AUTO: 23.9 %
HCT VFR BLD AUTO: 24.1 %
HGB BLD-MCNC: 7.3 G/DL
HGB BLD-MCNC: 7.5 G/DL
HGB BLD-MCNC: 7.6 G/DL
HGB BLD-MCNC: 7.7 G/DL
IMM GRANULOCYTES # BLD AUTO: 0.16 X10(3) UL (ref 0–1)
IMM GRANULOCYTES NFR BLD: 1.8 %
LYMPHOCYTES # BLD AUTO: 1.41 X10(3) UL (ref 1–4)
LYMPHOCYTES NFR BLD AUTO: 15.7 %
MCH RBC QN AUTO: 29.3 PG (ref 26–34)
MCHC RBC AUTO-ENTMCNC: 31.5 G/DL (ref 31–37)
MCV RBC AUTO: 93.1 FL
MONOCYTES # BLD AUTO: 0.71 X10(3) UL (ref 0.1–1)
MONOCYTES NFR BLD AUTO: 7.9 %
NEUTROPHILS # BLD AUTO: 6.43 X10 (3) UL (ref 1.5–7.7)
NEUTROPHILS # BLD AUTO: 6.43 X10(3) UL (ref 1.5–7.7)
NEUTROPHILS NFR BLD AUTO: 71.7 %
OSMOLALITY SERPL CALC.SUM OF ELEC: 297 MOSM/KG (ref 275–295)
PLATELET # BLD AUTO: 196 10(3)UL (ref 150–450)
POTASSIUM SERPL-SCNC: 3.6 MMOL/L (ref 3.5–5.1)
PROT SERPL-MCNC: 5.4 G/DL (ref 6.4–8.2)
RBC # BLD AUTO: 2.59 X10(6)UL
SODIUM SERPL-SCNC: 143 MMOL/L (ref 136–145)
WBC # BLD AUTO: 9 X10(3) UL (ref 4–11)

## 2023-06-02 PROCEDURE — 99233 SBSQ HOSP IP/OBS HIGH 50: CPT | Performed by: STUDENT IN AN ORGANIZED HEALTH CARE EDUCATION/TRAINING PROGRAM

## 2023-06-02 PROCEDURE — 99232 SBSQ HOSP IP/OBS MODERATE 35: CPT | Performed by: ORTHOPAEDIC SURGERY

## 2023-06-02 RX ORDER — ENOXAPARIN SODIUM 100 MG/ML
40 INJECTION SUBCUTANEOUS DAILY
Qty: 8 ML | Refills: 0 | Status: SHIPPED | OUTPATIENT
Start: 2023-06-02 | End: 2023-06-22

## 2023-06-02 RX ORDER — ENOXAPARIN SODIUM 100 MG/ML
40 INJECTION SUBCUTANEOUS DAILY
Status: DISCONTINUED | OUTPATIENT
Start: 2023-06-02 | End: 2023-06-03

## 2023-06-02 NOTE — PLAN OF CARE
Pt AOx4. VSS. RA. No complain of pain N/V/D. LLE edema/ hx knee replacement. Dressing changed. Hgb 7.5. Monitoring. Good appetite. Will continue plan of care. Problem: CARDIOVASCULAR - ADULT  Goal: Maintains optimal cardiac output and hemodynamic stability  Description: INTERVENTIONS:  - Monitor vital signs, rhythm, and trends  - Monitor for bleeding, hypotension and signs of decreased cardiac output  - Evaluate effectiveness of vasoactive medications to optimize hemodynamic stability  - Monitor arterial and/or venous puncture sites for bleeding and/or hematoma  - Assess quality of pulses, skin color and temperature  - Assess for signs of decreased coronary artery perfusion - ex.  Angina  - Evaluate fluid balance, assess for edema, trend weights  Outcome: Progressing     Problem: RESPIRATORY - ADULT  Goal: Achieves optimal ventilation and oxygenation  Description: INTERVENTIONS:  - Assess for changes in respiratory status  - Assess for changes in mentation and behavior  - Position to facilitate oxygenation and minimize respiratory effort  - Oxygen supplementation based on oxygen saturation or ABGs  - Provide Smoking Cessation handout, if applicable  - Encourage broncho-pulmonary hygiene including cough, deep breathe, Incentive Spirometry  - Assess the need for suctioning and perform as needed  - Assess and instruct to report SOB or any respiratory difficulty  - Respiratory Therapy support as indicated  - Manage/alleviate anxiety  - Monitor for signs/symptoms of CO2 retention  Outcome: Progressing     Problem: GASTROINTESTINAL - ADULT  Goal: Minimal or absence of nausea and vomiting  Description: INTERVENTIONS:  - Maintain adequate hydration with IV or PO as ordered and tolerated  - Nasogastric tube to low intermittent suction as ordered  - Evaluate effectiveness of ordered antiemetic medications  - Provide nonpharmacologic comfort measures as appropriate  - Advance diet as tolerated, if ordered  - Obtain nutritional consult as needed  - Evaluate fluid balance  Outcome: Progressing  Goal: Maintains or returns to baseline bowel function  Description: INTERVENTIONS:  - Assess bowel function  - Maintain adequate hydration with IV or PO as ordered and tolerated  - Evaluate effectiveness of GI medications  - Encourage mobilization and activity  - Obtain nutritional consult as needed  - Establish a toileting routine/schedule  - Consider collaborating with pharmacy to review patient's medication profile  Outcome: Progressing  Goal: Maintains adequate nutritional intake (undernourished)  Description: INTERVENTIONS:  - Monitor percentage of each meal consumed  - Identify factors contributing to decreased intake, treat as appropriate  - Assist with meals as needed  - Monitor I&O, WT and lab values  - Obtain nutritional consult as needed  - Optimize oral hygiene and moisture  - Encourage food from home; allow for food preferences  - Enhance eating environment  Outcome: Progressing     Problem: SKIN/TISSUE INTEGRITY - ADULT  Goal: Skin integrity remains intact  Description: INTERVENTIONS  - Assess and document risk factors for pressure ulcer development  - Assess and document skin integrity  - Monitor for areas of redness and/or skin breakdown  - Initiate interventions, skin care algorithm/standards of care as needed  Outcome: Progressing

## 2023-06-02 NOTE — PLAN OF CARE
Pt a/o x4. VSS on RA. Pt declines need for pain medication. Meds per MAR. Pt up to bathroom w/ walker & SBA. H&H q8h. Latest Hgb 7.3. No bleeding noted. Pt reports 1x BM. Fall risk protocol followed, call light within reach.

## 2023-06-02 NOTE — PHYSICAL THERAPY NOTE
PHYSICAL THERAPY TREATMENT NOTE - INPATIENT    Room Number: 877/345-Q     Session: 1     Number of Visits to Meet Established Goals: 3    Presenting Problem: GI bleed  Co-Morbidities : R TKA , OA  ASSESSMENT     Pt continues to progress toward functional goals and presents with decreased ROM R knee, impaired strength and decreased endurance below PLOF. Pt reports compliance c seated therex per TKA protocol. Pt will continue to benefit from ongoing skilled therapy to maximize functional independence. The AM-PAC '6-Clicks' Inpatient Basic Mobility Short Form was completed and this patient is demonstrating a 21% degree of impairment in mobility. Research supports that patients with this level of impairment may benefit from [address]  .       DISCHARGE RECOMMENDATIONS  PT Discharge Recommendations: Home with home health PT     PLAN  PT Treatment Plan: Gait training;Range of motion;Strengthening;Balance training  Rehab Potential : Good  Frequency (Obs): 3x/week    CURRENT GOALS     Goal #1 Patient is able to demonstrate supine - sit EOB @ level: modified independent      Goal #2 Patient is able to demonstrate transfers Sit to/from Stand at assistance level: modified independent      Goal #3 Patient is able to ambulate 300 feet with assist device: LRAD at assistance level: modified independent      Goal #4 Pt will be ind/mod I in HEP for B LE while seated/supine      Goal #5     Goal #6     Goal Comments: Goals established on 2023 all goals progressing       SUBJECTIVE  \"I was home and cooking and walking with out a walker \"     OBJECTIVE       WEIGHT BEARING RESTRICTION  Weight Bearing Restriction: None                PAIN ASSESSMENT   Ratin  Location: pt denies       BALANCE                                                                                                                       Static Sitting: Good  Dynamic Sitting: Fair +           Static Standing: Fair -  Dynamic Standing: Fair -    ACTIVITY TOLERANCE                         O2 WALK         AM-PAC '6-Clicks' INPATIENT SHORT FORM - BASIC MOBILITY  How much difficulty does the patient currently have. .. Patient Difficulty: Turning over in bed (including adjusting bedclothes, sheets and blankets)?: None   Patient Difficulty: Sitting down on and standing up from a chair with arms (e.g., wheelchair, bedside commode, etc.): None   Patient Difficulty: Moving from lying on back to sitting on the side of the bed?: None   How much help from another person does the patient currently need. .. Help from Another: Moving to and from a bed to a chair (including a wheelchair)?: None   Help from Another: Need to walk in hospital room?: A Little   Help from Another: Climbing 3-5 steps with a railing?: A Little       AM-PAC Score:  Raw Score: 22   Approx Degree of Impairment: 20.91%   Standardized Score (AM-PAC Scale): 53.28   CMS Modifier (G-Code): CJ    FUNCTIONAL ABILITY STATUS  Gait Assessment   Functional Mobility/Gait Assessment  Gait Assistance: Supervision  Distance (ft): 150,15  Assistive Device: Rolling walker  Pattern: R Decreased stance time (decreased R knee flexion during swing phase)    Skilled Therapy Provided  Pt presents in semi sup. Pt gait trained c RW cues for increased R knee flexion during swing phase, scapular depression and reciprocal gait . Toilet t/f to toilet CGA, min A from low toilet c use of grab bar. Pt refuses riser over toilet d/t difficulty wiping d/t shoulder. Pt has comfort height toilet at home. Pt performed seated and standing therex per TKA protocol. Significance of achieving good ROM in a timely fashion explained. Pt encouraged to amb c nsg staff 3-4 x /day, pt verbalizes understanding.     Bed Mobility:  Rolling:    Supine<>Sit: supervision, HOB elevated    Sit<>Supine: nt      Transfer Mobility:  Sit<>Stand: supervision    Stand<>Sit: supervision    Gait: supervision           THERAPEUTIC EXERCISES  Lower Extremity Ankle pumps  Heel raises  Heel slides  HS curls      Upper Extremity Scapular Retraction     Position Sitting & Standing     Repetitions   15   Sets   1     Patient End of Session: Up in chair;Needs met;Call light within reach;RN aware of session/findings; All patient questions and concerns addressed; Alarm set    PT Session Time: 38 minutes  Gait Training: 15 minutes  Therapeutic Activity: 15 minutes  Therapeutic Exercise: 8 minutes   Neuromuscular Re-education:  minutes

## 2023-06-02 NOTE — PROGRESS NOTES
[facility] Progress Note:    S: Doing well. Feels pain is very well controlled, minimal. Happy with the straightness of her knee. Denies any lightheadedness/SOB/dizziness. O:  RLE NVI, incision CDI    Labs: Hgb stable at 7.5    A/P: 80 female 1.5 weeks postop from R TKA admitted for UGI bleed. Stable following EGD procedure. D/w Hospitalist/GI - will start lovenox and plan for discharge as long as no s/sx of GI bleed. Plan for 3 weeks lovenox for VTED ppx postop from knee replacement.     Ama Graham MD, [address] Orthopedic Surgery  Phone [phone]  Fax [phone]

## 2023-06-03 VITALS
WEIGHT: 174.81 LBS | BODY MASS INDEX: 32 KG/M2 | TEMPERATURE: 98 F | OXYGEN SATURATION: 98 % | RESPIRATION RATE: 18 BRPM | DIASTOLIC BLOOD PRESSURE: 69 MMHG | SYSTOLIC BLOOD PRESSURE: 125 MMHG | HEART RATE: 80 BPM

## 2023-06-03 LAB
HCT VFR BLD AUTO: 23.1 %
HCT VFR BLD AUTO: 29.8 %
HGB BLD-MCNC: 7.4 G/DL
HGB BLD-MCNC: 9.3 G/DL

## 2023-06-03 PROCEDURE — 99239 HOSP IP/OBS DSCHRG MGMT >30: CPT | Performed by: STUDENT IN AN ORGANIZED HEALTH CARE EDUCATION/TRAINING PROGRAM

## 2023-06-03 RX ORDER — FUROSEMIDE 40 MG/1
40 TABLET ORAL DAILY
Status: DISCONTINUED | OUTPATIENT
Start: 2023-06-03 | End: 2023-06-03

## 2023-06-03 RX ORDER — ALPRAZOLAM 0.5 MG/1
0.5 TABLET ORAL 2 TIMES DAILY PRN
Refills: 0 | Status: SHIPPED | COMMUNITY
Start: 2023-06-03

## 2023-06-03 RX ORDER — ALPRAZOLAM 0.5 MG/1
0.5 TABLET ORAL 2 TIMES DAILY PRN
Status: DISCONTINUED | OUTPATIENT
Start: 2023-06-03 | End: 2023-06-03

## 2023-06-03 RX ORDER — PANTOPRAZOLE SODIUM 40 MG/1
40 TABLET, DELAYED RELEASE ORAL
Qty: 180 TABLET | Refills: 1 | Status: SHIPPED | OUTPATIENT
Start: 2023-06-03 | End: 2023-09-01

## 2023-06-03 NOTE — PLAN OF CARE
Pt A&Ox4, VSS, on RA. Pt denies pain. Pt reports x1BM today with no blood noted. Q8 hour Hgb-last draw 7.4. /72 recheck 125/69. Meds administered per MAR. Fall precautions in place. Call light within reach. Will continue to monitor.

## 2023-06-03 NOTE — PLAN OF CARE
Discharged Home via Wheelchair. Accompanied by Family member  Belongings Taken by patient/family. Discharge teaching provided and verbalized understanding.      Problem: SKIN/TISSUE INTEGRITY - ADULT  Goal: Skin integrity remains intact  Description: INTERVENTIONS  - Assess and document risk factors for pressure ulcer development  - Assess and document skin integrity  - Monitor for areas of redness and/or skin breakdown  - Initiate interventions, skin care algorithm/standards of care as needed  Outcome: Adequate for Discharge     Problem: GASTROINTESTINAL - ADULT  Goal: Minimal or absence of nausea and vomiting  Description: INTERVENTIONS:  - Maintain adequate hydration with IV or PO as ordered and tolerated  - Nasogastric tube to low intermittent suction as ordered  - Evaluate effectiveness of ordered antiemetic medications  - Provide nonpharmacologic comfort measures as appropriate  - Advance diet as tolerated, if ordered  - Obtain nutritional consult as needed  - Evaluate fluid balance  Outcome: Adequate for Discharge  Goal: Maintains or returns to baseline bowel function  Description: INTERVENTIONS:  - Assess bowel function  - Maintain adequate hydration with IV or PO as ordered and tolerated  - Evaluate effectiveness of GI medications  - Encourage mobilization and activity  - Obtain nutritional consult as needed  - Establish a toileting routine/schedule  - Consider collaborating with pharmacy to review patient's medication profile  Outcome: Adequate for Discharge  Goal: Maintains adequate nutritional intake (undernourished)  Description: INTERVENTIONS:  - Monitor percentage of each meal consumed  - Identify factors contributing to decreased intake, treat as appropriate  - Assist with meals as needed  - Monitor I&O, WT and lab values  - Obtain nutritional consult as needed  - Optimize oral hygiene and moisture  - Encourage food from home; allow for food preferences  - Enhance eating environment  Outcome: Adequate for Discharge

## 2023-06-05 ENCOUNTER — PATIENT OUTREACH (OUTPATIENT)
Dept: CASE MANAGEMENT | Age: 83
End: 2023-06-05

## 2023-06-05 DIAGNOSIS — Z02.9 ENCOUNTERS FOR ADMINISTRATIVE PURPOSE: ICD-10-CM

## 2023-06-05 PROCEDURE — 1111F DSCHRG MED/CURRENT MED MERGE: CPT

## 2023-06-06 ENCOUNTER — TELEPHONE (OUTPATIENT)
Dept: PHYSICAL THERAPY | Facility: HOSPITAL | Age: 83
End: 2023-06-06

## 2023-06-07 ENCOUNTER — OFFICE VISIT (OUTPATIENT)
Dept: ORTHOPEDICS CLINIC | Facility: CLINIC | Age: 83
End: 2023-06-07
Payer: MEDICARE

## 2023-06-07 ENCOUNTER — LAB ENCOUNTER (OUTPATIENT)
Dept: LAB | Age: 83
End: 2023-06-07
Attending: STUDENT IN AN ORGANIZED HEALTH CARE EDUCATION/TRAINING PROGRAM
Payer: MEDICARE

## 2023-06-07 ENCOUNTER — OFFICE VISIT (OUTPATIENT)
Dept: INTERNAL MEDICINE CLINIC | Facility: CLINIC | Age: 83
End: 2023-06-07
Payer: MEDICARE

## 2023-06-07 VITALS
HEIGHT: 62 IN | TEMPERATURE: 97 F | BODY MASS INDEX: 31.65 KG/M2 | SYSTOLIC BLOOD PRESSURE: 112 MMHG | DIASTOLIC BLOOD PRESSURE: 58 MMHG | WEIGHT: 172 LBS | HEART RATE: 76 BPM

## 2023-06-07 VITALS — HEIGHT: 62 IN | BODY MASS INDEX: 30.46 KG/M2 | WEIGHT: 165.5 LBS

## 2023-06-07 DIAGNOSIS — Z87.11 HISTORY OF GASTRIC ULCER: ICD-10-CM

## 2023-06-07 DIAGNOSIS — Z96.651 STATUS POST RIGHT KNEE REPLACEMENT: Primary | ICD-10-CM

## 2023-06-07 DIAGNOSIS — Z96.651 HISTORY OF TOTAL RIGHT KNEE REPLACEMENT: ICD-10-CM

## 2023-06-07 DIAGNOSIS — K57.90 DIVERTICULOSIS: ICD-10-CM

## 2023-06-07 DIAGNOSIS — K92.2 LOWER GI BLEED: ICD-10-CM

## 2023-06-07 DIAGNOSIS — K29.80 DUODENITIS: ICD-10-CM

## 2023-06-07 DIAGNOSIS — L24.89 IRRITANT CONTACT DERMATITIS DUE TO OTHER AGENTS: ICD-10-CM

## 2023-06-07 DIAGNOSIS — D50.0 BLOOD LOSS ANEMIA: Primary | ICD-10-CM

## 2023-06-07 LAB
BASOPHILS # BLD AUTO: 0.04 X10(3) UL (ref 0–0.2)
BASOPHILS NFR BLD AUTO: 0.5 %
EOSINOPHIL # BLD AUTO: 0.2 X10(3) UL (ref 0–0.7)
EOSINOPHIL NFR BLD AUTO: 2.7 %
ERYTHROCYTE [DISTWIDTH] IN BLOOD BY AUTOMATED COUNT: 18.4 %
HCT VFR BLD AUTO: 29.3 %
HGB BLD-MCNC: 8.9 G/DL
IMM GRANULOCYTES # BLD AUTO: 0.03 X10(3) UL (ref 0–1)
IMM GRANULOCYTES NFR BLD: 0.4 %
LYMPHOCYTES # BLD AUTO: 0.97 X10(3) UL (ref 1–4)
LYMPHOCYTES NFR BLD AUTO: 13 %
MCH RBC QN AUTO: 29.2 PG (ref 26–34)
MCHC RBC AUTO-ENTMCNC: 30.4 G/DL (ref 31–37)
MCV RBC AUTO: 96.1 FL
MONOCYTES # BLD AUTO: 0.56 X10(3) UL (ref 0.1–1)
MONOCYTES NFR BLD AUTO: 7.5 %
NEUTROPHILS # BLD AUTO: 5.64 X10 (3) UL (ref 1.5–7.7)
NEUTROPHILS # BLD AUTO: 5.64 X10(3) UL (ref 1.5–7.7)
NEUTROPHILS NFR BLD AUTO: 75.9 %
PLATELET # BLD AUTO: 348 10(3)UL (ref 150–450)
RBC # BLD AUTO: 3.05 X10(6)UL
WBC # BLD AUTO: 7.4 X10(3) UL (ref 4–11)

## 2023-06-07 PROCEDURE — 99024 POSTOP FOLLOW-UP VISIT: CPT | Performed by: PHYSICIAN ASSISTANT

## 2023-06-07 PROCEDURE — 1111F DSCHRG MED/CURRENT MED MERGE: CPT | Performed by: INTERNAL MEDICINE

## 2023-06-07 PROCEDURE — 85025 COMPLETE CBC W/AUTO DIFF WBC: CPT

## 2023-06-07 PROCEDURE — 36415 COLL VENOUS BLD VENIPUNCTURE: CPT

## 2023-06-07 PROCEDURE — 1126F AMNT PAIN NOTED NONE PRSNT: CPT | Performed by: PHYSICIAN ASSISTANT

## 2023-06-07 PROCEDURE — 1111F DSCHRG MED/CURRENT MED MERGE: CPT | Performed by: PHYSICIAN ASSISTANT

## 2023-06-07 PROCEDURE — 99496 TRANSJ CARE MGMT HIGH F2F 7D: CPT | Performed by: INTERNAL MEDICINE

## 2023-06-07 NOTE — PROGRESS NOTES
[facility] Progress Note    Subjective: Patient returns to clinic status post right total knee arthroplasty performed on 5/23/2023. She has been doing very well since surgery with improvement of preoperative knee pain. She now only experiences pain around the thigh. Her course was unfortunately complicated by upper GI bleed but this has been well controlled since discharge from the hospital.  She is on Lovenox for DVT prophylaxis and will do so for another 2 weeks at which point she will be discontinued from DVT prophylaxis completely. She has been taking Tylenol as needed and oxycodone 2-3 times per day for pain control which has been helpful. Her physical therapy was also unfortunately delayed and she will just be attending her second session of home physical therapy this week. She has been ambulating with a rolling walker. Objective: Patient seated comfortably in the exam chair. Alert and oriented x3. Nonlabored breathing. Grossly neurologically intact. Incision is clean, dry, and intact with staples in place without any redness or drainage concerning for infection. Calves are soft and nontender. There is some mild pitting edema of the right calf. Ambulating with a rolling walker. Assessment/Plan: Megan Golden were removed in clinic today. The incision was swabbed with Betadine, Mastisol was applied to either side of the incision, and Steri-Strips were applied over the incision. I instructed the patient to avoid getting the incision wet in the shower for the next 2 days to allow the staple sites to reepithelialize. I also recommended avoiding soaking the incision in a pool or tub until the patient is 6 weeks postop. Continue with Lovenox for DVT prophylaxis for another 2 weeks. No refills of medications were needed at today's visit. Outpatient physical therapy referral written.   Patient will follow-up in 4 weeks with Dr. Avinash Montejo for routine 6-week postoperative visit or sooner if any concerns. The patient and her daughter's questions were sought and answered satisfactorily. She is happy with the plan will follow as advised. X-rays needed at next visit: Postoperative radiographs right knee and full leg length films        Meagan Barcenas PA-C  [facility]    This note was dictated using Dragon software. While it was briefly proofread prior to completion, some grammatical, spelling, and word choice errors due to dictation may still occur.

## 2023-06-08 ENCOUNTER — APPOINTMENT (OUTPATIENT)
Facility: LOCATION | Age: 83
End: 2023-06-08
Attending: ORTHOPAEDIC SURGERY
Payer: MEDICARE

## 2023-06-08 RX ORDER — SIMVASTATIN 20 MG
20 TABLET ORAL NIGHTLY
Qty: 90 TABLET | Refills: 0 | Status: SHIPPED | OUTPATIENT
Start: 2023-06-08

## 2023-06-13 ENCOUNTER — APPOINTMENT (OUTPATIENT)
Facility: LOCATION | Age: 83
End: 2023-06-13
Attending: ORTHOPAEDIC SURGERY
Payer: MEDICARE

## 2023-06-13 PROBLEM — E78.2 HYPERLIPIDEMIA, MIXED: Status: ACTIVE | Noted: 2022-07-01

## 2023-06-13 PROBLEM — R00.2 PALPITATIONS: Status: ACTIVE | Noted: 2022-07-01

## 2023-06-13 PROBLEM — R42 DIZZINESS: Status: ACTIVE | Noted: 2022-07-01

## 2023-06-13 PROBLEM — R60.0 BILATERAL LEG EDEMA: Status: ACTIVE | Noted: 2022-07-01

## 2023-06-13 PROBLEM — R20.0 RIGHT ARM NUMBNESS: Status: ACTIVE | Noted: 2022-07-01

## 2023-06-15 ENCOUNTER — APPOINTMENT (OUTPATIENT)
Facility: LOCATION | Age: 83
End: 2023-06-15
Attending: ORTHOPAEDIC SURGERY
Payer: MEDICARE

## 2023-06-20 ENCOUNTER — APPOINTMENT (OUTPATIENT)
Facility: LOCATION | Age: 83
End: 2023-06-20
Attending: ORTHOPAEDIC SURGERY
Payer: MEDICARE

## 2023-06-20 ENCOUNTER — LAB ENCOUNTER (OUTPATIENT)
Dept: LAB | Age: 83
End: 2023-06-20
Attending: INTERNAL MEDICINE
Payer: MEDICARE

## 2023-06-20 ENCOUNTER — OFFICE VISIT (OUTPATIENT)
Facility: LOCATION | Age: 83
End: 2023-06-20
Attending: ORTHOPAEDIC SURGERY
Payer: MEDICARE

## 2023-06-20 DIAGNOSIS — Z96.651 STATUS POST TOTAL RIGHT KNEE REPLACEMENT: ICD-10-CM

## 2023-06-20 DIAGNOSIS — D50.0 BLOOD LOSS ANEMIA: ICD-10-CM

## 2023-06-20 LAB
BASOPHILS # BLD AUTO: 0.05 X10(3) UL (ref 0–0.2)
BASOPHILS NFR BLD AUTO: 0.7 %
EOSINOPHIL # BLD AUTO: 0.16 X10(3) UL (ref 0–0.7)
EOSINOPHIL NFR BLD AUTO: 2.4 %
ERYTHROCYTE [DISTWIDTH] IN BLOOD BY AUTOMATED COUNT: 15.7 %
HCT VFR BLD AUTO: 38.2 %
HGB BLD-MCNC: 11.4 G/DL
IMM GRANULOCYTES # BLD AUTO: 0.01 X10(3) UL (ref 0–1)
IMM GRANULOCYTES NFR BLD: 0.1 %
LYMPHOCYTES # BLD AUTO: 1.5 X10(3) UL (ref 1–4)
LYMPHOCYTES NFR BLD AUTO: 22.4 %
MCH RBC QN AUTO: 28.4 PG (ref 26–34)
MCHC RBC AUTO-ENTMCNC: 29.8 G/DL (ref 31–37)
MCV RBC AUTO: 95 FL
MONOCYTES # BLD AUTO: 0.47 X10(3) UL (ref 0.1–1)
MONOCYTES NFR BLD AUTO: 7 %
NEUTROPHILS # BLD AUTO: 4.51 X10 (3) UL (ref 1.5–7.7)
NEUTROPHILS # BLD AUTO: 4.51 X10(3) UL (ref 1.5–7.7)
NEUTROPHILS NFR BLD AUTO: 67.4 %
PLATELET # BLD AUTO: 389 10(3)UL (ref 150–450)
RBC # BLD AUTO: 4.02 X10(6)UL
WBC # BLD AUTO: 6.7 X10(3) UL (ref 4–11)

## 2023-06-20 PROCEDURE — 85025 COMPLETE CBC W/AUTO DIFF WBC: CPT

## 2023-06-20 PROCEDURE — 97162 PT EVAL MOD COMPLEX 30 MIN: CPT

## 2023-06-20 PROCEDURE — 36415 COLL VENOUS BLD VENIPUNCTURE: CPT

## 2023-06-20 PROCEDURE — 97110 THERAPEUTIC EXERCISES: CPT

## 2023-06-22 ENCOUNTER — TELEPHONE (OUTPATIENT)
Dept: PHYSICAL THERAPY | Facility: HOSPITAL | Age: 83
End: 2023-06-22

## 2023-06-22 ENCOUNTER — OFFICE VISIT (OUTPATIENT)
Facility: LOCATION | Age: 83
End: 2023-06-22
Attending: ORTHOPAEDIC SURGERY
Payer: MEDICARE

## 2023-06-22 PROCEDURE — 97110 THERAPEUTIC EXERCISES: CPT

## 2023-06-22 NOTE — PROGRESS NOTES
Dx: Status post right knee replacement (H51.249)            Authorized # of Visits:  12  Fall Risk: standard         Precautions: n/a             Subjective: The patient reports her knee is feeling [id number]  Current Pain Ratin/10  Objective:   See flow sheet    Assessment:   The patient tolerated treatment well with improving knee ROM and no significantly increased complaints of pain    Plan:   Continue PT to address soft tissue and joint restrictions and provide instruction in a progressive therapeutic exercise program with manual and verbal cueing for proper form and technique    Date: 2023  Tx#:  Date: Tx#: 3/ Date: Tx#: 4/ Date: Tx#: 5/ Date: Tx#: 6/ Date: Tx#: 7/ Date: Tx#: 8/   TherEx TherEx TherEx TherEx TherEx TherEx TherEx   Nustep L5 x 10 min         Supine PROM knee flexion and extension         Heel slides x 10         SAQ 2 x 10 with 5 sec holds         Seated LAQ 2 x 10 with 5 sec holds         Seated knee flexion and extension stretches         Slantboard gastroc stretch 3 x 30 sec         Reviewed HEP         -         HEP: SAQ, LAQ, Seated knee flexion/extension stretches    Charges:  TherEx x 3       Total Timed Treatment: 45 min  Total Treatment Time: 45 min

## 2023-06-27 ENCOUNTER — OFFICE VISIT (OUTPATIENT)
Facility: LOCATION | Age: 83
End: 2023-06-27
Attending: ORTHOPAEDIC SURGERY
Payer: MEDICARE

## 2023-06-27 PROCEDURE — 97110 THERAPEUTIC EXERCISES: CPT

## 2023-06-29 ENCOUNTER — OFFICE VISIT (OUTPATIENT)
Facility: LOCATION | Age: 83
End: 2023-06-29
Attending: ORTHOPAEDIC SURGERY
Payer: MEDICARE

## 2023-06-29 ENCOUNTER — TELEPHONE (OUTPATIENT)
Dept: ORTHOPEDICS CLINIC | Facility: CLINIC | Age: 83
End: 2023-06-29

## 2023-06-29 PROCEDURE — 97110 THERAPEUTIC EXERCISES: CPT

## 2023-07-03 ENCOUNTER — OFFICE VISIT (OUTPATIENT)
Facility: LOCATION | Age: 83
End: 2023-07-03
Attending: ORTHOPAEDIC SURGERY
Payer: MEDICARE

## 2023-07-03 PROCEDURE — 97110 THERAPEUTIC EXERCISES: CPT

## 2023-07-03 RX ORDER — ALPRAZOLAM 0.5 MG/1
0.5 TABLET ORAL 2 TIMES DAILY PRN
Qty: 60 TABLET | Refills: 0 | Status: SHIPPED | OUTPATIENT
Start: 2023-07-03

## 2023-07-06 ENCOUNTER — APPOINTMENT (OUTPATIENT)
Facility: LOCATION | Age: 83
End: 2023-07-06
Attending: ORTHOPAEDIC SURGERY
Payer: MEDICARE

## 2023-07-06 ENCOUNTER — OFFICE VISIT (OUTPATIENT)
Facility: LOCATION | Age: 83
End: 2023-07-06
Attending: ORTHOPAEDIC SURGERY
Payer: MEDICARE

## 2023-07-06 PROCEDURE — 97110 THERAPEUTIC EXERCISES: CPT

## 2023-07-06 NOTE — PROGRESS NOTES
Dx: Status post right knee replacement (T84.834)            Authorized # of Visits:  12  Fall Risk: standard         Precautions: n/a             Subjective: The patient reports she has been walking without her walker more at home lately  Current Pain Ratin/10  Objective:   See flow sheet  ROM: Right knee 2-112 degrees    Assessment:   The patient tolerated exercises well with improving mobility and good tolerance for ambulation without her walker    Plan:   Progress ROM and strengthening as tolerated    Date: 2023  Tx#:  Date:   2023  Tx#: 3/12 Date: 2023  Tx#:  Date:   7/3/2023  Tx#:  Date:   2023  Tx#:  Date: Tx#:  Date:    Tx#: 8/   TherEx TherEx TherEx TherEx TherEx TherEx TherEx   Nustep L5 x 10 min Nustep L5 x 10 min Nustep L5 x 10 min Nustep L5 x 10 min Nustep L5 x 10 min     Supine PROM knee flexion and extension Supine PROM knee flexion/extension Supine PROM knee flexion/extension as tolerated Supine PROM knee flexion/extension as tolerated Supine heel slides with strap     Heel slides x 10 Supine heel slides with strap x 10 with 10 sec holds Supine heel slides with strap x 10 with 10 sec holds Seated PROM knee flexion SAQ 2# 2 x 15 with 5 sec holds     SAQ 2 x 10 with 5 sec holds SAQ 2 x 15 with 5 sec holds SAQ 2# 2 x 15 with 5 sec holds Standing HS stretch 3 x 30 sec Shuttle Bilateral 125# 3 x 15     Seated LAQ 2 x 10 with 5 sec holds Slantboard gastroc stretch 3 x 30 sec Standing HS stretch 2 x 30 sec Standing knee flexion stretch with foot on step Standing HS stretch 3 x 30 sec     Seated knee flexion and extension stretches Shuttle Bilateral 125# 3 x 15 Standing knee flexion stretch with foot on step 2 x 30 sec Slantboard gastroc stretch 3 x 30 sec Standing knee flexion stretch with foot on step     Slantboard gastroc stretch 3 x 30 sec - Shuttle Bilateral 125# 3 x 15 Shuttle Bilateral 125# 3 x 15 Standing TKE x 10 with 5 sec holds     Reviewed HEP - Reviewed HEP Reviewed HEP Reviewed HEP     - - -  -     HEP: SAQ, LAQ, Seated knee flexion/extension stretches    Charges:  TherEx x 3       Total Timed Treatment: 40 min  Total Treatment Time: 40 min

## 2023-07-10 ENCOUNTER — TELEPHONE (OUTPATIENT)
Dept: ORTHOPEDICS CLINIC | Facility: CLINIC | Age: 83
End: 2023-07-10

## 2023-07-10 ENCOUNTER — OFFICE VISIT (OUTPATIENT)
Dept: ORTHOPEDICS CLINIC | Facility: CLINIC | Age: 83
End: 2023-07-10
Payer: MEDICARE

## 2023-07-10 ENCOUNTER — OFFICE VISIT (OUTPATIENT)
Dept: INTERNAL MEDICINE CLINIC | Facility: CLINIC | Age: 83
End: 2023-07-10
Payer: MEDICARE

## 2023-07-10 ENCOUNTER — HOSPITAL ENCOUNTER (OUTPATIENT)
Dept: GENERAL RADIOLOGY | Age: 83
Discharge: HOME | End: 2023-07-10
Attending: ORTHOPAEDIC SURGERY
Payer: MEDICARE

## 2023-07-10 VITALS
WEIGHT: 169 LBS | TEMPERATURE: 98 F | HEART RATE: 72 BPM | HEIGHT: 61 IN | SYSTOLIC BLOOD PRESSURE: 118 MMHG | RESPIRATION RATE: 16 BRPM | DIASTOLIC BLOOD PRESSURE: 82 MMHG | BODY MASS INDEX: 31.91 KG/M2 | OXYGEN SATURATION: 98 %

## 2023-07-10 VITALS — HEIGHT: 61 IN | OXYGEN SATURATION: 97 % | HEART RATE: 95 BPM | WEIGHT: 169 LBS | BODY MASS INDEX: 31.91 KG/M2

## 2023-07-10 DIAGNOSIS — Z96.651 STATUS POST RIGHT KNEE REPLACEMENT: Primary | ICD-10-CM

## 2023-07-10 DIAGNOSIS — Z96.651 STATUS POST RIGHT KNEE REPLACEMENT: ICD-10-CM

## 2023-07-10 DIAGNOSIS — Z87.19 HISTORY OF DUODENAL ULCER: ICD-10-CM

## 2023-07-10 DIAGNOSIS — F32.A ANXIETY AND DEPRESSION: ICD-10-CM

## 2023-07-10 DIAGNOSIS — F41.9 ANXIETY AND DEPRESSION: ICD-10-CM

## 2023-07-10 DIAGNOSIS — K29.80 DUODENITIS: ICD-10-CM

## 2023-07-10 DIAGNOSIS — Z96.651 HISTORY OF TOTAL RIGHT KNEE REPLACEMENT: ICD-10-CM

## 2023-07-10 DIAGNOSIS — Z87.11 HISTORY OF GASTRIC ULCER: ICD-10-CM

## 2023-07-10 DIAGNOSIS — D50.0 BLOOD LOSS ANEMIA: Primary | ICD-10-CM

## 2023-07-10 PROCEDURE — 99214 OFFICE O/P EST MOD 30 MIN: CPT | Performed by: INTERNAL MEDICINE

## 2023-07-10 PROCEDURE — 99024 POSTOP FOLLOW-UP VISIT: CPT | Performed by: ORTHOPAEDIC SURGERY

## 2023-07-10 PROCEDURE — 73562 X-RAY EXAM OF KNEE 3: CPT | Performed by: ORTHOPAEDIC SURGERY

## 2023-07-10 PROCEDURE — 1126F AMNT PAIN NOTED NONE PRSNT: CPT | Performed by: INTERNAL MEDICINE

## 2023-07-10 PROCEDURE — 77073 BONE LENGTH STUDIES: CPT | Performed by: ORTHOPAEDIC SURGERY

## 2023-07-10 PROCEDURE — 1126F AMNT PAIN NOTED NONE PRSNT: CPT | Performed by: ORTHOPAEDIC SURGERY

## 2023-07-11 ENCOUNTER — OFFICE VISIT (OUTPATIENT)
Facility: LOCATION | Age: 83
End: 2023-07-11
Attending: ORTHOPAEDIC SURGERY
Payer: MEDICARE

## 2023-07-11 PROCEDURE — 97110 THERAPEUTIC EXERCISES: CPT

## 2023-07-11 PROCEDURE — 97140 MANUAL THERAPY 1/> REGIONS: CPT

## 2023-07-11 NOTE — PROGRESS NOTES
Dx: Status post right knee replacement (N15.243)            Authorized # of Visits:  12  Fall Risk: standard         Precautions: n/a             Subjective: The patient reports she has been doing better overall   Current Pain Ratin/10  Objective:   See flow sheet  ROM: Right knee 2-112 degrees    Assessment:   The patient tolerated treatment well and demonstrated a good understanding of self scar mobilization    Plan:   Progress ROM and strengthening as tolerated    Date: 2023  Tx#:  Date:   2023  Tx#: 3/12 Date: 2023  Tx#:  Date:   7/3/2023  Tx#:  Date:   2023  Tx#:  Date:  2023   Tx#:  Date:    Tx#: 8/   TherEx TherEx TherEx TherEx TherEx TherEx TherEx   Nustep L5 x 10 min Nustep L5 x 10 min Nustep L5 x 10 min Nustep L5 x 10 min Nustep L5 x 10 min Nustep L5 x 10 min    Supine PROM knee flexion and extension Supine PROM knee flexion/extension Supine PROM knee flexion/extension as tolerated Supine PROM knee flexion/extension as tolerated Supine heel slides with strap Supine PROM knee flexion/extension    Heel slides x 10 Supine heel slides with strap x 10 with 10 sec holds Supine heel slides with strap x 10 with 10 sec holds Seated PROM knee flexion SAQ 2# 2 x 15 with 5 sec holds SAQ 2# 2 x 15    SAQ 2 x 10 with 5 sec holds SAQ 2 x 15 with 5 sec holds SAQ 2# 2 x 15 with 5 sec holds Standing HS stretch 3 x 30 sec Shuttle Bilateral 125# 3 x 15 Shuttle Bilateral 125# 3 x 15    Seated LAQ 2 x 10 with 5 sec holds Slantboard gastroc stretch 3 x 30 sec Standing HS stretch 2 x 30 sec Standing knee flexion stretch with foot on step Standing HS stretch 3 x 30 sec Reviewed HEP    Seated knee flexion and extension stretches Shuttle Bilateral 125# 3 x 15 Standing knee flexion stretch with foot on step 2 x 30 sec Slantboard gastroc stretch 3 x 30 sec Standing knee flexion stretch with foot on step Manual Therapy    Slantboard gastroc stretch 3 x 30 sec - Shuttle Bilateral 125# 3 x 15 Shuttle Bilateral 125# 3 x 15 Standing TKE x 10 with 5 sec holds Scar mobilization     Reviewed HEP - Reviewed HEP Reviewed HEP Reviewed HEP Inferior patellar glides grade 3    - - -  - -    HEP: SAQ, LAQ, Seated knee flexion/extension stretches    Charges:  TherEx x 2; MT x 1       Total Timed Treatment: 38 min  Total Treatment Time: 38 min

## 2023-07-13 ENCOUNTER — OFFICE VISIT (OUTPATIENT)
Facility: LOCATION | Age: 83
End: 2023-07-13
Attending: ORTHOPAEDIC SURGERY
Payer: MEDICARE

## 2023-07-13 PROCEDURE — 97110 THERAPEUTIC EXERCISES: CPT

## 2023-07-13 NOTE — PROGRESS NOTES
Dx: Status post right knee replacement (Y10.217)            Authorized # of Visits:  12  Fall Risk: standard         Precautions: n/a             Subjective:    The patient reports her knee felt more stiff/tight yesterday  Current Pain Ratin/10  Objective:   See flow sheet  ROM: Right knee 2-112 degrees    Assessment:   The patient demonstrated good ROM, despite reports of her knee feeling more stiff/tight yesterday    Plan:   Progress ROM and strengthening as tolerated    Date: 2023  Tx#:  Date:   2023  Tx#: 3/12 Date: 2023  Tx#:  Date:   7/3/2023  Tx#:  Date:   2023  Tx#:  Date:  2023   Tx#:  Date:  2023   Tx#:    TherEx TherEx TherEx TherEx TherEx TherEx TherEx   Nustep L5 x 10 min Nustep L5 x 10 min Nustep L5 x 10 min Nustep L5 x 10 min Nustep L5 x 10 min Nustep L5 x 10 min Nustep L5 x 10 min   Supine PROM knee flexion and extension Supine PROM knee flexion/extension Supine PROM knee flexion/extension as tolerated Supine PROM knee flexion/extension as tolerated Supine heel slides with strap Supine PROM knee flexion/extension Supine PROM knee flexion/extension   Heel slides x 10 Supine heel slides with strap x 10 with 10 sec holds Supine heel slides with strap x 10 with 10 sec holds Seated PROM knee flexion SAQ 2# 2 x 15 with 5 sec holds SAQ 2# 2 x 15 Shuttle Bilateral 125# 3 x 15   SAQ 2 x 10 with 5 sec holds SAQ 2 x 15 with 5 sec holds SAQ 2# 2 x 15 with 5 sec holds Standing HS stretch 3 x 30 sec Shuttle Bilateral 125# 3 x 15 Shuttle Bilateral 125# 3 x 15 Standing knee flexion stretch with foot on step 3 x 30 sec   Seated LAQ 2 x 10 with 5 sec holds Slantboard gastroc stretch 3 x 30 sec Standing HS stretch 2 x 30 sec Standing knee flexion stretch with foot on step Standing HS stretch 3 x 30 sec Reviewed HEP Standing HS stretch 3 x 30 sec   Seated knee flexion and extension stretches Shuttle Bilateral 125# 3 x 15 Standing knee flexion stretch with foot on step 2 x 30 sec Slantboard gastroc stretch 3 x 30 sec Standing knee flexion stretch with foot on step Manual Therapy Sidestepping at mirror 2 x to fatigue   Slantboard gastroc stretch 3 x 30 sec - Shuttle Bilateral 125# 3 x 15 Shuttle Bilateral 125# 3 x 15 Standing TKE x 10 with 5 sec holds Scar mobilization  Reviewed HEP   Reviewed HEP - Reviewed HEP Reviewed HEP Reviewed HEP Inferior patellar glides grade 3 -   - - -  - - -   HEP: SAQ, LAQ, Seated knee flexion/extension stretches    Charges:  TherEx x 3       Total Timed Treatment: 40 min  Total Treatment Time: 40 min

## 2023-07-25 ENCOUNTER — OFFICE VISIT (OUTPATIENT)
Facility: LOCATION | Age: 83
End: 2023-07-25
Attending: ORTHOPAEDIC SURGERY
Payer: MEDICARE

## 2023-07-25 PROCEDURE — 97110 THERAPEUTIC EXERCISES: CPT

## 2023-07-25 NOTE — PROGRESS NOTES
Dx: Status post right knee replacement (B79.446)            Authorized # of Visits:  12  Fall Risk: standard         Precautions: n/a             Subjective:    The patient reports she's continued to feel good without significant knee pain and improving mobility  Current Pain Ratin/10  Objective:   See flow sheet  ROM: Right knee 2-112 degrees    Assessment:   The patient tolerated exercises well without significant complaints of knee pain    Plan:   Progress ROM and strengthening as tolerated    Date: 2023  Tx#:  Date:   7/3/2023  Tx#:  Date:   2023  Tx#:  Date:  2023   Tx#:  Date:  2023   Tx#:  Date:  2023T  Tx#:    TherEx TherEx TherEx TherEx TherEx TherEx   Nustep L5 x 10 min Nustep L5 x 10 min Nustep L5 x 10 min Nustep L5 x 10 min Nustep L5 x 10 min Nustep L5 x 10 min   Supine PROM knee flexion/extension as tolerated Supine PROM knee flexion/extension as tolerated Supine heel slides with strap Supine PROM knee flexion/extension Supine PROM knee flexion/extension Supine PROM knee flexion/extension   Supine heel slides with strap x 10 with 10 sec holds Seated PROM knee flexion SAQ 2# 2 x 15 with 5 sec holds SAQ 2# 2 x 15 Shuttle Bilateral 125# 3 x 15 Shuttle Bilateral 125# 3 x 15   SAQ 2# 2 x 15 with 5 sec holds Standing HS stretch 3 x 30 sec Shuttle Bilateral 125# 3 x 15 Shuttle Bilateral 125# 3 x 15 Standing knee flexion stretch with foot on step 3 x 30 sec Standing knee flexion AROM   Standing HS stretch 2 x 30 sec Standing knee flexion stretch with foot on step Standing HS stretch 3 x 30 sec Reviewed HEP Standing HS stretch 3 x 30 sec Standing knee flexion/extension stretch with foot on step 3 x 30 sec each   Standing knee flexion stretch with foot on step 2 x 30 sec Slantboard gastroc stretch 3 x 30 sec Standing knee flexion stretch with foot on step Manual Therapy Sidestepping at mirror 2 x to fatigue SAQ 2# 2 x 15 with 5 sec holds   Shuttle Bilateral 125# 3 x 15 Shuttle Bilateral 125# 3 x 15 Standing TKE x 10 with 5 sec holds Scar mobilization  Reviewed HEP Sidestepping with yellow band to fatigue   Reviewed HEP Reviewed HEP Reviewed HEP Inferior patellar glides grade 3 - Slantboard gastroc stretch 3 x 30 sec   -  - - - Reviewed HEP   HEP: SAQ, LAQ, Seated knee flexion/extension stretches    Charges:  TherEx x 3       Total Timed Treatment: 45 min  Total Treatment Time: 45 min

## 2023-07-27 ENCOUNTER — OFFICE VISIT (OUTPATIENT)
Facility: LOCATION | Age: 83
End: 2023-07-27
Attending: ORTHOPAEDIC SURGERY
Payer: MEDICARE

## 2023-07-27 PROCEDURE — 97110 THERAPEUTIC EXERCISES: CPT

## 2023-07-27 NOTE — PROGRESS NOTES
Dx: Status post right knee replacement (V16.538)            Authorized # of Visits:  12  Fall Risk: standard         Precautions: n/a            Progress Summary  Pt has attended 10 visits in Physical Therapy. Subjective: The patient reports she's been exercising at home regularly and overall her knee has been feeling pretty good  Current Pain Ratin/10  Objective:   Gait: The patient ambulates independently without an assistive device      Bed Mobility/Transfers: The patient is able to perform supine<>sit modified independently. The patient is able to perform sit to stand modified independently      ROM: PROM right knee 0-120 degrees     Flexibility: Minimal limitations in bilateral hamstrings. Moderate limitations in quadriceps     Strength/MMT: Hip flexors 5/5, Quadriceps 5/5, Hamstrings 5/5, Ankle dorsiflexors 5/5    Assessment:   The patient has demonstrated improvements in ROM, strength, tolerance for activity/exercise and ambulation.  She would benefit from continued skilled physical therapy to address remaining deficits and functional limitations    Goals:  (To be met in 12 visits)  Patient to demonstrate independence and compliance with a comprehensive home exercise program Progressing towards  Patient to demonstrate improved right knee AROM to be 0-120 degrees to facilitate performance of transfers and stairs Progressing towards  Patient to ambulate community distances of at least 2000 feet independently without an assistive device Progressing towards  Patient to ascend/descend 12 steps with reciprocal pattern safely to facilitate participation in community activities Progressing towards    Plan:   Continue PT 2 additional treatment sessions    Date:   2023  Tx#:  Date:  2023   Tx#:  Date:  2023   Tx#:  Date:  2023  Tx#:  Date:  2023  Tx#: 10/12   TherEx TherEx TherEx TherEx TherEx   Nustep L5 x 10 min Nustep L5 x 10 min Nustep L5 x 10 min Nustep L5 x 10 min Nustep L5 x 10 min   Supine heel slides with strap Supine PROM knee flexion/extension Supine PROM knee flexion/extension Supine PROM knee flexion/extension Supine PROM knee flexion/extension   SAQ 2# 2 x 15 with 5 sec holds SAQ 2# 2 x 15 Shuttle Bilateral 125# 3 x 15 Shuttle Bilateral 125# 3 x 15 Sidestepping with yellow band 2 x to fatigue   Shuttle Bilateral 125# 3 x 15 Shuttle Bilateral 125# 3 x 15 Standing knee flexion stretch with foot on step 3 x 30 sec Standing knee flexion AROM Standing HS stretch 3 x 30 sec   Standing HS stretch 3 x 30 sec Reviewed HEP Standing HS stretch 3 x 30 sec Standing knee flexion/extension stretch with foot on step 3 x 30 sec each Shuttle Bilateral 125   Standing knee flexion stretch with foot on step Manual Therapy Sidestepping at mirror 2 x to fatigue SAQ 2# 2 x 15 with 5 sec holds    Standing TKE x 10 with 5 sec holds Scar mobilization  Reviewed HEP Sidestepping with yellow band to fatigue    Reviewed HEP Inferior patellar glides grade 3 - Slantboard gastroc stretch 3 x 30 sec    - - - Reviewed HEP    HEP: SAQ, LAQ, Seated knee flexion/extension stretches    Charges:  TherEx x 3       Total Timed Treatment: 45 min  Total Treatment Time: 45 min

## 2023-07-31 ENCOUNTER — TELEPHONE (OUTPATIENT)
Dept: PHYSICAL THERAPY | Facility: HOSPITAL | Age: 83
End: 2023-07-31

## 2023-08-01 ENCOUNTER — APPOINTMENT (OUTPATIENT)
Facility: LOCATION | Age: 83
End: 2023-08-01
Attending: ORTHOPAEDIC SURGERY
Payer: MEDICARE

## 2023-08-02 NOTE — TELEPHONE ENCOUNTER
Disp Refills Start End    ALPRAZolam 0.5 MG Oral Tab 60 tablet 0 7/3/2023     Sig - Route: Take 1 tablet (0.5 mg total) by mouth 2 (two) times daily as needed for Sleep or Anxiety. - Oral    Sent to pharmacy as: ALPRAZolam 0.5 MG Oral Tablet (Xanax)    E-Prescribing Status: Receipt confirmed by pharmacy (7/3/2023  3:13 PM CDT)      Pharmacy    [address] [address] AT [address], [phone], [phone]       Pt requested this via [employer], not autofill    Last visit with MP with request to     Return in 4 weeks (on 7/5/2023).

## 2023-08-03 ENCOUNTER — OFFICE VISIT (OUTPATIENT)
Facility: LOCATION | Age: 83
End: 2023-08-03
Attending: ORTHOPAEDIC SURGERY
Payer: MEDICARE

## 2023-08-03 ENCOUNTER — TELEPHONE (OUTPATIENT)
Dept: INTERNAL MEDICINE CLINIC | Facility: CLINIC | Age: 83
End: 2023-08-03

## 2023-08-03 PROCEDURE — 97110 THERAPEUTIC EXERCISES: CPT

## 2023-08-03 RX ORDER — ALPRAZOLAM 0.5 MG/1
0.5 TABLET ORAL 2 TIMES DAILY PRN
Qty: 60 TABLET | Refills: 0 | Status: SHIPPED | OUTPATIENT
Start: 2023-08-02

## 2023-08-03 NOTE — TELEPHONE ENCOUNTER
Future Appointments   Date Time Provider Cornelio Nelson   10/13/2023 11:20 AM Uma Calix MD [facility]     Patient is scheduled for [address] Please place orders with Jonny Walton. Patient aware to fast.  No call back required. Patient informed that labs need to be completed no sooner than 2 weeks prior to the appointment.

## 2023-08-03 NOTE — PROGRESS NOTES
Dx: Status post right knee replacement (F97.888)            Authorized # of Visits:  12  Fall Risk: standard         Precautions: n/a             Subjective: The patient reports her knee has continued to feel good  Current Pain Ratin/10  Objective:   See flow sheet    Assessment:   The patient tolerated treatment well without any significant complaints of increased knee pain.  The knee continues to be limited into extension initially, but following PROM ROM improves    Plan:   Continue PT 1 additional treatment session    Date:   2023  Tx#:  Date:  2023   Tx#:  Date:  2023   Tx#:  Date:  2023  Tx#:  Date:  2023  Tx#: 10/12 Date:  8/3/2023  Tx#:    TherEx TherEx TherEx TherEx TherEx TherEx   Nustep L5 x 10 min Nustep L5 x 10 min Nustep L5 x 10 min Nustep L5 x 10 min Nustep L5 x 10 min Nustep L5 x 10 min   Supine heel slides with strap Supine PROM knee flexion/extension Supine PROM knee flexion/extension Supine PROM knee flexion/extension Supine PROM knee flexion/extension Supine PROM right knee as tolerated   SAQ 2# 2 x 15 with 5 sec holds SAQ 2# 2 x 15 Shuttle Bilateral 125# 3 x 15 Shuttle Bilateral 125# 3 x 15 Sidestepping with yellow band 2 x to fatigue Shuttle Bilateral 125# 3 x 15   Shuttle Bilateral 125# 3 x 15 Shuttle Bilateral 125# 3 x 15 Standing knee flexion stretch with foot on step 3 x 30 sec Standing knee flexion AROM Standing HS stretch 3 x 30 sec Standing HS stretch 3 x 30 sec   Standing HS stretch 3 x 30 sec Reviewed HEP Standing HS stretch 3 x 30 sec Standing knee flexion/extension stretch with foot on step 3 x 30 sec each Shuttle Bilateral 125 Slantboard gastroc stretch 3 x 30 sec   Standing knee flexion stretch with foot on step Manual Therapy Sidestepping at mirror 2 x to fatigue SAQ 2# 2 x 15 with 5 sec holds  Standing knee flexion stretch with foot on step   Standing TKE x 10 with 5 sec holds Scar mobilization  Reviewed HEP Sidestepping with yellow band to fatigue  SAQ 2# 2 x 15 with 5 sec holds   Reviewed HEP Inferior patellar glides grade 3 - Slantboard gastroc stretch 3 x 30 sec  6 inch step FSU 2 x 10   - - - Reviewed HEP  Reviewed HEP   HEP: SAQ, LAQ, Seated knee flexion/extension stretches    Charges:  TherEx x 3       Total Timed Treatment: 45 min  Total Treatment Time: 45 min

## 2023-08-08 ENCOUNTER — OFFICE VISIT (OUTPATIENT)
Facility: LOCATION | Age: 83
End: 2023-08-08
Attending: ORTHOPAEDIC SURGERY
Payer: MEDICARE

## 2023-08-08 PROCEDURE — 97110 THERAPEUTIC EXERCISES: CPT

## 2023-08-08 NOTE — PROGRESS NOTES
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 The patient is able to perform sit to stand modified independently      ROM: PROM right knee 0-120 degrees     Flexibility: Minimal limitations in bilateral hamstrings. Moderate limitations in quadriceps     Strength/MMT: Hip flexors 5/5, Quadriceps 5/5, Hamstrings 5/5, Ankle dorsiflexors 5/5     Assessment:   The patient has demonstrated improvements in ROM, strength, tolerance for activity/exercise and ambulation. She has been given a home exercise program and has demonstrated/verbalized a good understanding of the exercises.       Goals:  (To be met in 12 visits)  Patient to demonstrate independence and compliance with a comprehensive home exercise program Met  Patient to demonstrate improved right knee AROM to be 0-120 degrees to facilitate performance of transfers and stairs Progressing towards  Patient to ambulate community distances of at least 2000 feet independently without an assistive device Progressing towards   Patient to ascend/descend 12 steps with reciprocal pattern safely to facilitate participation in community activities Met     Plan:   The patient will be discharged from outpatient physical therapy and will continue independently with a home exercise program    Date:  2023   Tx#:  Date:  2023  Tx#:  Date:  2023  Tx#: 10/12 Date:  8/3/2023  Tx#:  Date:  2023  Tx#:    TherEx TherEx TherEx TherEx TherEx   Nustep L5 x 10 min Nustep L5 x 10 min Nustep L5 x 10 min Nustep L5 x 10 min Nustep L5 x 10 min   Supine PROM knee flexion/extension Supine PROM knee flexion/extension Supine PROM knee flexion/extension Supine PROM right knee as tolerated Shuttle Bilateral 125# 3 x 15   Shuttle Bilateral 125# 3 x 15 Shuttle Bilateral 125# 3 x 15 Sidestepping with yellow band 2 x to fatigue Shuttle Bilateral 125# 3 x 15 Supine PROM right knee as tolerated   Standing knee flexion stretch with foot on step 3 x 30 sec Standing knee flexion AROM Standing HS stretch 3 x 30 sec Standing HS stretch 3 x 30 sec Slantboard gastroc stretch 3 x 30 sec   Standing HS stretch 3 x 30 sec Standing knee flexion/extension stretch with foot on step 3 x 30 sec each Shuttle Bilateral 125 Slantboard gastroc stretch 3 x 30 sec Standing HS stretch 3 x 30 sec   Sidestepping at mirror 2 x to fatigue SAQ 2# 2 x 15 with 5 sec holds  Standing knee flexion stretch with foot on step Standing knee flexion stretch with foot on step   Reviewed HEP Sidestepping with yellow band to fatigue  SAQ 2# 2 x 15 with 5 sec holds 6 inch step FSU 2 x 10   - Slantboard gastroc stretch 3 x 30 sec  6 inch step FSU 2 x 10 Standing hip flexor stretch x 30 sec R/L   - Reviewed HEP  Reviewed HEP    HEP: SAQ, LAQ, Seated knee flexion/extension stretches, Standing hip flexor stretch, standing HS stretch    Charges:  TherEx x 3       Total Timed Treatment: 45 min  Total Treatment Time: 45 min

## 2023-08-15 ENCOUNTER — LAB ENCOUNTER (OUTPATIENT)
Dept: LAB | Age: 83
End: 2023-08-15
Attending: INTERNAL MEDICINE
Payer: MEDICARE

## 2023-08-15 DIAGNOSIS — D50.0 BLOOD LOSS ANEMIA: ICD-10-CM

## 2023-08-15 LAB
HCT VFR BLD AUTO: 40.6 %
HGB BLD-MCNC: 12.9 G/DL

## 2023-08-15 PROCEDURE — 85018 HEMOGLOBIN: CPT

## 2023-08-15 PROCEDURE — 36415 COLL VENOUS BLD VENIPUNCTURE: CPT

## 2023-08-15 PROCEDURE — 85014 HEMATOCRIT: CPT

## 2023-08-21 ENCOUNTER — ANESTHESIA EVENT (OUTPATIENT)
Dept: ENDOSCOPY | Facility: HOSPITAL | Age: 83
End: 2023-08-21
Payer: MEDICARE

## 2023-08-21 ENCOUNTER — ANESTHESIA (OUTPATIENT)
Dept: ENDOSCOPY | Facility: HOSPITAL | Age: 83
End: 2023-08-21
Payer: MEDICARE

## 2023-08-21 ENCOUNTER — HOSPITAL ENCOUNTER (OUTPATIENT)
Facility: HOSPITAL | Age: 83
Setting detail: HOSPITAL OUTPATIENT SURGERY
Discharge: HOME | End: 2023-08-21
Attending: INTERNAL MEDICINE | Admitting: INTERNAL MEDICINE
Payer: MEDICARE

## 2023-08-21 VITALS
WEIGHT: 180 LBS | HEART RATE: 51 BPM | RESPIRATION RATE: 16 BRPM | DIASTOLIC BLOOD PRESSURE: 79 MMHG | SYSTOLIC BLOOD PRESSURE: 131 MMHG | HEIGHT: 62 IN | OXYGEN SATURATION: 100 % | TEMPERATURE: 98 F | BODY MASS INDEX: 33.13 KG/M2

## 2023-08-21 DIAGNOSIS — K25.0 ACUTE GASTRIC ULCER WITH HEMORRHAGE: ICD-10-CM

## 2023-08-21 PROCEDURE — 88305 TISSUE EXAM BY PATHOLOGIST: CPT | Performed by: INTERNAL MEDICINE

## 2023-08-21 PROCEDURE — 0D998ZX DRAINAGE OF DUODENUM, VIA NATURAL OR ARTIFICIAL OPENING ENDOSCOPIC, DIAGNOSTIC: ICD-10-PCS | Performed by: INTERNAL MEDICINE

## 2023-08-21 PROCEDURE — BD47ZZZ ULTRASONOGRAPHY OF GASTROINTESTINAL TRACT: ICD-10-PCS | Performed by: INTERNAL MEDICINE

## 2023-08-21 RX ORDER — SODIUM CHLORIDE, SODIUM LACTATE, POTASSIUM CHLORIDE, CALCIUM CHLORIDE 600; 310; 30; 20 MG/100ML; MG/100ML; MG/100ML; MG/100ML
INJECTION, SOLUTION INTRAVENOUS CONTINUOUS
Status: DISCONTINUED | OUTPATIENT
Start: 2023-08-21 | End: 2023-08-21

## 2023-08-21 NOTE — ANESTHESIA POSTPROCEDURE EVALUATION
[facility] Patient Status:  Hospital Outpatient Surgery   Age/Gender 80year old female MRN [id number]   Location [address] [address] Attending Leanne Marvin DO   Hosp Day # 0 PCP Lisandro Lake MD       Anesthesia Post-op Note    ESOPHAGOGASTRODUODENOSCOPY (EGD),  ENDOSCOPIC ULTRASOUND (EUS) WITH FNB    Procedure Summary       Date: 08/21/23 Room / Location: [facility] ENDOSCOPY 02 / [facility] ENDOSCOPY    Anesthesia Start: 2901 Anesthesia Stop: 1500    Procedures:       ESOPHAGOGASTRODUODENOSCOPY (EGD), ENDOSCOPIC ULTRASOUND (EUS) WITH FNB      ENDOSCOPIC ULTRASOUND (EUS) Diagnosis:       Acute gastric ulcer with hemorrhage      (DUODENAL NODULE)    Surgeons: Leanne Marvin DO Anesthesiologist: Stoney Roy MD    Anesthesia Type: MAC ASA Status: 2            Anesthesia Type: MAC    Vitals Value Taken Time   /39 08/21/23 1457   Temp na 08/21/23 1500   Pulse 58 08/21/23 1459   Resp 16 08/21/23 1457   SpO2 95 % 08/21/23 1459   Vitals shown include unvalidated device data. Patient Location: Endoscopy    Anesthesia Type: MAC    Airway Patency: patent    Postop Pain Control: adequate    Mental Status: preanesthetic baseline    Nausea/Vomiting: none    Cardiopulmonary/Hydration status: stable euvolemic    Complications: no apparent anesthesia related complications    Postop vital signs: stable    Dental Exam: Unchanged from Preop    Patient to be discharged home when criteria met.

## 2023-08-21 NOTE — H&P
History & Physical Examination    Patient Name: Zoë Chapa  MRN: [id number]  CSN: [id number]  YOB: 1940    Diagnosis: gastric ulcer, subepithelial lesion    Present Illness: 79 y/o F history as above presents for EGD possible EUS. ALPRAZolam 0.5 MG Oral Tab, Take 1 tablet (0.5 mg total) by mouth 2 (two) times daily as needed for Sleep or Anxiety. , Disp: 60 tablet, Rfl: 0, 8/20/2023 at 2200  simvastatin 20 MG Oral Tab, Take 1 tablet (20 mg total) by mouth nightly., Disp: 90 tablet, Rfl: 0, 8/20/2023 at 2000  pantoprazole 40 MG Oral Tab EC, Take 1 tablet (40 mg total) by mouth 2 (two) times daily before meals. (Patient taking differently: Take 1 tablet (40 mg total) by mouth every morning before breakfast.), Disp: 180 tablet, Rfl: 1, 8/20/2023 at 1800  cyanocobalamin 1000 MCG Oral Tab, Take 1 tablet (1,000 mcg total) by mouth daily. , Disp: , Rfl: , Past Week  Vitamin C 500 MG Oral Tab, Take 2 tablets (1,000 mg total) by mouth daily. , Disp: , Rfl: , Past Week  cholecalciferol 50 MCG (2000 UT) Oral Cap, Take 1 capsule (2,000 Units total) by mouth daily. , Disp: , Rfl: , Past Week  amLODIPine Besy-Benazepril HCl 5-10 MG Oral Cap, Take 1 capsule by mouth daily. , Disp: 90 capsule, Rfl: 0, 8/21/2023 at 0600  nadolol 40 MG Oral Tab, Take 1 tablet (40 mg total) by mouth daily. , Disp: , Rfl: , 8/21/2023 at 0600      lactated ringers infusion, , Intravenous, Continuous        Allergies:   Pollen                  OTHER (SEE COMMENTS)    Comment:Stuffy nose    Past Medical History:   Diagnosis Date    Anxiety     Disorder of thyroid     Diverticulosis     Hearing impairment     bilateral hearing aids    Heartburn     High blood pressure     High cholesterol     History of blood transfusion 05/2023    History of cardiac murmur     HTN (hypertension)     Hyperlipidemia     Osteoarthritis     Prediabetes     Tinnitus     Visual impairment     glasses    Wears glasses      Past Surgical History:   Procedure Laterality Date    COLONOSCOPY N/A 05/30/2023    Procedure: COLONOSCOPY;  Surgeon: Alessandro Valverde DO;  Location: [address] [place] [address] ENDOSCOPY    KNEE REPLACEMENT SURGERY      TOTAL KNEE REPLACEMENT Right 05/23/2023     History reviewed. No pertinent family history. Social History    Tobacco Use      Smoking status: Never      Smokeless tobacco: Never    Alcohol use: Yes      Comment: 1 glass of wine nightly      SYSTEM Check if Review is Normal Check if Physical Exam is Normal If not normal, please explain:   HEENT [ x] [x ]    NECK & BACK [ x] [ x]    HEART [ x] [x ]    LUNGS [ x] [ x]    ABDOMEN [ x] [x ]    UROGENITAL [ n/a] [ n/a]    EXTREMITIES [ x] [x ]    OTHER        [ x ] I have discussed the risks and benefits and alternatives with the patient/family. They understand and agree to proceed with plan of care. [ x ] I have reviewed the History and Physical done within the last 30 days. Any changes noted above.     [facility]  8/21/2023  2:15 PM

## 2023-08-21 NOTE — DISCHARGE INSTRUCTIONS
Home Care Instructions for Gastroscopy with Sedation    Diet:  - Resume your regular diet as tolerated unless otherwise instructed. - Start with light meals to minimize bloating.  - Do not drink alcohol today. Medication:  - If you have questions about resuming your normal medications, please contact your Primary Care Physician. Activities:  - Take it easy today. Do not return to work today. - Do not drive today. - Do not operate any machinery today (including kitchen equipment). Gastroscopy:  - You may have a sore throat for 2-3 days following the exam. This is normal. Gargling with warm salt water (1/2 tsp salt to 1 glass warm water) or using throat lozenges will help. - If you experience any sharp pain in your neck, abdomen or chest, vomiting of blood, oral temperature over 100 degrees Fahrenheit, light-headedness or dizziness, or any other problems, contact your doctor. **If unable to reach your doctor, please go to the [facility] Emergency Room**    - Your referring physician will receive a full report of your examination.  - If you do not hear from your doctor's office within two weeks of your biopsy, please call them for your results. You may be able to see your laboratory results in [email or web address] between 4 and 7 business days. In some cases, your physician may not have viewed the results before they are released to [address] If you have questions regarding your results contact the physician who ordered the test/exam by phone or via [address] by choosing \"Ask a Medical Question. \"

## 2023-09-04 RX ORDER — ALPRAZOLAM 0.5 MG/1
0.5 TABLET ORAL 2 TIMES DAILY PRN
Qty: 60 TABLET | Refills: 0 | Status: CANCELLED | OUTPATIENT
Start: 2023-09-04

## 2023-09-06 NOTE — TELEPHONE ENCOUNTER
John Julian, DIL to pt, came into office looking for the below medication for her MIL. Pt is down to one pill and she needs it to sleep and is worried about it taking longer.

## 2023-09-07 RX ORDER — ALPRAZOLAM 0.5 MG/1
0.5 TABLET ORAL 2 TIMES DAILY PRN
Qty: 60 TABLET | Refills: 0 | OUTPATIENT
Start: 2023-09-07

## 2023-09-07 NOTE — TELEPHONE ENCOUNTER
Requested Prescriptions     Pending Prescriptions Disp Refills    ALPRAZolam 0.5 MG Oral Tab 60 tablet 0     Sig: Take 1 tablet (0.5 mg total) by mouth 2 (two) times daily as needed for Sleep or Anxiety. LOV: 7/10/23    LAST PHYSICAL: 8/4/22    LAST REFILL: alprazolam-60-8/2/23    LAST LAB: 6/2/23    Your Appointments      Friday October 20, 2023 10:00 AM  Medicare Annual Well Visit with Kate Hall MD  [address], [place] ([facility] [place]) [address] [place] [address] [address] [address] [phone]        Monday November 27, 2023  Gastroenterology Procedure with DO Maren Broussard 46 (--) [facility] [address] [address]  [phone]   Masks are optional for all patients and visitors, unless otherwise indicated. Monday November 27, 2023  2:30 PM  EUS with Lis (--) No address on file  [phone]   Please arrive 60 minutes prior to your scheduled procedure time.           Monday May 27, 2024  9:00 AM  Post Op Visit with Israel Heller MD  [address], [place] ([address]) [address] [facility] [address] [address] [address] [phone]

## 2023-09-07 NOTE — TELEPHONE ENCOUNTER
Last VISIT 07/10/2023    Last CPE 08/04/2022    Last REFILL  Medication Quantity Refills Start End   ALPRAZolam 0.5 MG Oral Tab 60 tablet 0 8/2/2023    Sig:   Take 1 tablet (0.5 mg total) by mouth 2 (two) times daily as needed for Sleep or Anxiety. Last LABS    Future Appointments   Date Time Provider Cornelio Nelson   10/20/2023 10:00 AM Frank Chow MD [facility]       Per [employer]? Refill pended    Please Approve or Deny.

## 2023-09-07 NOTE — TELEPHONE ENCOUNTER
Daughter in law calling to check status of refill, patient is out of medication and will need to take a pill today. Multiple request have been sent.

## 2023-09-10 RX ORDER — ALPRAZOLAM 0.5 MG/1
0.5 TABLET ORAL 2 TIMES DAILY PRN
Qty: 60 TABLET | Refills: 0 | OUTPATIENT
Start: 2023-09-10

## 2023-10-03 RX ORDER — ALPRAZOLAM 0.5 MG/1
0.5 TABLET ORAL 2 TIMES DAILY PRN
Qty: 60 TABLET | Refills: 0 | Status: SHIPPED | OUTPATIENT
Start: 2023-10-03

## 2023-10-03 NOTE — TELEPHONE ENCOUNTER
Last visit 7/10/23 with      ALPRAZolam 0.5 MG Oral Tab 60 tablet 0 9/7/2023     Sig - Route:  Take 1 tablet (0.5 mg total) by mouth 2 (two) times daily as needed for Sleep or Anxiety. - Oral    Sent to pharmacy as: ALPRAZolam 0.5 MG Oral Tablet (Xanax)    E-Prescribing Status: Receipt confirmed by pharmacy (9/7/2023  4:09 PM CDT)      Pharmacy    [address], [address] [facility]  [address] [place] [address] AT [address], [phone], [phone]

## 2023-10-20 ENCOUNTER — OFFICE VISIT (OUTPATIENT)
Dept: INTERNAL MEDICINE CLINIC | Facility: CLINIC | Age: 83
End: 2023-10-20
Payer: MEDICARE

## 2023-10-20 VITALS
HEIGHT: 61.81 IN | WEIGHT: 158.38 LBS | HEART RATE: 56 BPM | DIASTOLIC BLOOD PRESSURE: 82 MMHG | BODY MASS INDEX: 29.15 KG/M2 | SYSTOLIC BLOOD PRESSURE: 128 MMHG | OXYGEN SATURATION: 98 %

## 2023-10-20 DIAGNOSIS — K29.80 DUODENITIS: ICD-10-CM

## 2023-10-20 DIAGNOSIS — H93.13 TINNITUS OF BOTH EARS: ICD-10-CM

## 2023-10-20 DIAGNOSIS — Z87.11 HISTORY OF GASTRIC ULCER: ICD-10-CM

## 2023-10-20 DIAGNOSIS — G56.01 CARPAL TUNNEL SYNDROME OF RIGHT WRIST: ICD-10-CM

## 2023-10-20 DIAGNOSIS — F41.9 ANXIETY AND DEPRESSION: ICD-10-CM

## 2023-10-20 DIAGNOSIS — E04.1 THYROID NODULE: ICD-10-CM

## 2023-10-20 DIAGNOSIS — R01.1 CARDIAC MURMUR: ICD-10-CM

## 2023-10-20 DIAGNOSIS — F32.A ANXIETY AND DEPRESSION: ICD-10-CM

## 2023-10-20 DIAGNOSIS — I10 PRIMARY HYPERTENSION: ICD-10-CM

## 2023-10-20 DIAGNOSIS — Z00.00 ENCOUNTER FOR ANNUAL HEALTH EXAMINATION: Primary | ICD-10-CM

## 2023-10-20 DIAGNOSIS — Z96.651 HISTORY OF TOTAL RIGHT KNEE REPLACEMENT: ICD-10-CM

## 2023-10-20 DIAGNOSIS — H90.0 CONDUCTIVE HEARING LOSS, BILATERAL: ICD-10-CM

## 2023-10-20 DIAGNOSIS — M17.11 PRIMARY OSTEOARTHRITIS OF RIGHT KNEE: ICD-10-CM

## 2023-10-20 DIAGNOSIS — E78.00 PURE HYPERCHOLESTEROLEMIA: ICD-10-CM

## 2023-10-20 DIAGNOSIS — Z87.19 HISTORY OF DUODENAL ULCER: ICD-10-CM

## 2023-10-20 DIAGNOSIS — R73.03 PREDIABETES: ICD-10-CM

## 2023-10-20 RX ORDER — LOSARTAN POTASSIUM 25 MG/1
25 TABLET ORAL DAILY
COMMUNITY

## 2023-10-20 RX ORDER — FUROSEMIDE 40 MG/1
40 TABLET ORAL 2 TIMES DAILY
COMMUNITY

## 2023-10-20 RX ORDER — ZINC GLUCONATE 50 MG
TABLET ORAL
COMMUNITY

## 2023-10-20 RX ORDER — SERTRALINE HYDROCHLORIDE 100 MG/1
100 TABLET, FILM COATED ORAL DAILY
COMMUNITY

## 2023-10-20 RX ORDER — ALPRAZOLAM 0.5 MG/1
0.5 TABLET ORAL 2 TIMES DAILY PRN
Qty: 60 TABLET | Refills: 0 | Status: SHIPPED | OUTPATIENT
Start: 2023-10-20

## 2023-11-06 RX ORDER — ALPRAZOLAM 0.5 MG/1
0.5 TABLET ORAL 2 TIMES DAILY PRN
Qty: 60 TABLET | Refills: 0 | Status: SHIPPED | OUTPATIENT
Start: 2023-11-06

## 2023-11-06 NOTE — TELEPHONE ENCOUNTER
Last OV? 10/20/2023    Last CPE? 10/20/2023    Last Refill? Medication Quantity Refills Start End   ALPRAZolam 0.5 MG Oral Tab 60 tablet 0 10/3/2023    Sig:   Take 1 tablet (0.5 mg total) by mouth 2 (two) times daily as needed for Sleep or Anxiety. Last Labs? CBC- 06/20/2023  Hemoglobin and Hematocrit- 08/15/2023    Future Appointments   Date Time Provider Cornelio Solisisti   11/27/2023  2:30 PM DHOLAKIA, PROCEDURE SGIEDW None   12/5/2023  1:00 PM Elizabeth Carpio DO S[facility]P [facility] SUB GI   5/27/2024  9:00 AM Michaela Spurling, MD [facility]       Per Protocol?    Refill pended    Please Approve or Deny

## 2023-11-27 ENCOUNTER — ANESTHESIA (OUTPATIENT)
Dept: ENDOSCOPY | Facility: HOSPITAL | Age: 83
End: 2023-11-27
Payer: MEDICARE

## 2023-11-27 ENCOUNTER — ANESTHESIA EVENT (OUTPATIENT)
Dept: ENDOSCOPY | Facility: HOSPITAL | Age: 83
End: 2023-11-27
Payer: MEDICARE

## 2023-11-27 ENCOUNTER — HOSPITAL ENCOUNTER (OUTPATIENT)
Facility: HOSPITAL | Age: 83
Setting detail: HOSPITAL OUTPATIENT SURGERY
Discharge: HOME | End: 2023-11-27
Attending: INTERNAL MEDICINE | Admitting: INTERNAL MEDICINE
Payer: MEDICARE

## 2023-11-27 VITALS
TEMPERATURE: 97 F | SYSTOLIC BLOOD PRESSURE: 117 MMHG | OXYGEN SATURATION: 96 % | DIASTOLIC BLOOD PRESSURE: 71 MMHG | HEIGHT: 62 IN | HEART RATE: 59 BPM | RESPIRATION RATE: 16 BRPM | BODY MASS INDEX: 29.44 KG/M2 | WEIGHT: 160 LBS

## 2023-11-27 DIAGNOSIS — K25.3 ACUTE GASTRIC ULCER, UNSPECIFIED WHETHER GASTRIC ULCER HEMORRHAGE OR PERFORATION PRESENT: ICD-10-CM

## 2023-11-27 PROCEDURE — 0D998ZX DRAINAGE OF DUODENUM, VIA NATURAL OR ARTIFICIAL OPENING ENDOSCOPIC, DIAGNOSTIC: ICD-10-PCS | Performed by: INTERNAL MEDICINE

## 2023-11-27 PROCEDURE — 88305 TISSUE EXAM BY PATHOLOGIST: CPT | Performed by: INTERNAL MEDICINE

## 2023-11-27 RX ORDER — HYDROMORPHONE HYDROCHLORIDE 1 MG/ML
0.2 INJECTION, SOLUTION INTRAMUSCULAR; INTRAVENOUS; SUBCUTANEOUS EVERY 5 MIN PRN
OUTPATIENT
Start: 2023-11-27 | End: 2023-11-27

## 2023-11-27 RX ORDER — NALOXONE HYDROCHLORIDE 0.4 MG/ML
0.08 INJECTION, SOLUTION INTRAMUSCULAR; INTRAVENOUS; SUBCUTANEOUS AS NEEDED
OUTPATIENT
Start: 2023-11-27 | End: 2023-11-27

## 2023-11-27 RX ORDER — SODIUM CHLORIDE, SODIUM LACTATE, POTASSIUM CHLORIDE, CALCIUM CHLORIDE 600; 310; 30; 20 MG/100ML; MG/100ML; MG/100ML; MG/100ML
INJECTION, SOLUTION INTRAVENOUS CONTINUOUS PRN
Status: DISCONTINUED | OUTPATIENT
Start: 2023-11-27 | End: 2023-11-27 | Stop reason: SURG

## 2023-11-27 RX ORDER — HYDROMORPHONE HYDROCHLORIDE 1 MG/ML
0.6 INJECTION, SOLUTION INTRAMUSCULAR; INTRAVENOUS; SUBCUTANEOUS EVERY 5 MIN PRN
OUTPATIENT
Start: 2023-11-27 | End: 2023-11-27

## 2023-11-27 RX ORDER — SODIUM CHLORIDE, SODIUM LACTATE, POTASSIUM CHLORIDE, CALCIUM CHLORIDE 600; 310; 30; 20 MG/100ML; MG/100ML; MG/100ML; MG/100ML
INJECTION, SOLUTION INTRAVENOUS CONTINUOUS
OUTPATIENT
Start: 2023-11-27

## 2023-11-27 RX ORDER — HYDROMORPHONE HYDROCHLORIDE 1 MG/ML
0.4 INJECTION, SOLUTION INTRAMUSCULAR; INTRAVENOUS; SUBCUTANEOUS EVERY 5 MIN PRN
OUTPATIENT
Start: 2023-11-27 | End: 2023-11-27

## 2023-11-27 RX ORDER — SODIUM CHLORIDE, SODIUM LACTATE, POTASSIUM CHLORIDE, CALCIUM CHLORIDE 600; 310; 30; 20 MG/100ML; MG/100ML; MG/100ML; MG/100ML
INJECTION, SOLUTION INTRAVENOUS CONTINUOUS
Status: DISCONTINUED | OUTPATIENT
Start: 2023-11-27 | End: 2023-11-27

## 2023-11-27 RX ORDER — LIDOCAINE HYDROCHLORIDE 10 MG/ML
INJECTION, SOLUTION EPIDURAL; INFILTRATION; INTRACAUDAL; PERINEURAL AS NEEDED
Status: DISCONTINUED | OUTPATIENT
Start: 2023-11-27 | End: 2023-11-27 | Stop reason: SURG

## 2023-11-27 RX ADMIN — SODIUM CHLORIDE, SODIUM LACTATE, POTASSIUM CHLORIDE, CALCIUM CHLORIDE: 600; 310; 30; 20 INJECTION, SOLUTION INTRAVENOUS at 12:55:00

## 2023-11-27 RX ADMIN — LIDOCAINE HYDROCHLORIDE 25 MG: 10 INJECTION, SOLUTION EPIDURAL; INFILTRATION; INTRACAUDAL; PERINEURAL at 13:31:00

## 2023-11-27 NOTE — H&P
History & Physical Examination    Patient Name: Chalino Mercado  MRN: [id number]  CSN: [id number]  YOB: 1940    Diagnosis: duodenal subepithelial lesion    Present Illness: 79 y/o F history as above presents for EUS with FNA. Medications Prior to Admission   Medication Sig Dispense Refill Last Dose    ALPRAZolam 0.5 MG Oral Tab Take 1 tablet (0.5 mg total) by mouth 2 (two) times daily as needed for Sleep or Anxiety. 60 tablet 0 2023    furosemide 40 MG Oral Tab Take 1 tablet (40 mg total) by mouth 2 (two) times daily. 2023    sertraline 100 MG Oral Tab Take 1 tablet (100 mg total) by mouth daily. 2023    simvastatin 20 MG Oral Tab Take 1 tablet (20 mg total) by mouth nightly. 90 tablet 0 2023    [] pantoprazole 40 MG Oral Tab EC Take 1 tablet (40 mg total) by mouth 2 (two) times daily before meals. (Patient taking differently: Take 1 tablet (40 mg total) by mouth every morning before breakfast.) 180 tablet 1     amLODIPine Besy-Benazepril HCl 5-10 MG Oral Cap Take 1 capsule by mouth daily. 90 capsule 0 2023 at 0930    nadolol 40 MG Oral Tab Take 1 tablet (40 mg total) by mouth daily. 2023 at 0930    losartan 25 MG Oral Tab Take 1 tablet (25 mg total) by mouth daily. Unknown    B Complex-C-Folic Acid Oral Tab Take by mouth. (Patient not taking: Reported on 10/20/2023)   2023    Zinc 50 MG Oral Tab Take by mouth. (Patient not taking: Reported on 10/20/2023)   2023    cyanocobalamin 1000 MCG Oral Tab Take 1 tablet (1,000 mcg total) by mouth daily. (Patient not taking: Reported on 10/20/2023)   2023    Vitamin C 500 MG Oral Tab Take 2 tablets (1,000 mg total) by mouth daily. (Patient not taking: Reported on 10/20/2023)   2023    cholecalciferol 50 MCG (2000 UT) Oral Cap Take 1 capsule (2,000 Units total) by mouth daily.  (Patient not taking: Reported on 10/20/2023)   2023     Current Facility-Administered Medications Medication Dose Route Frequency    lactated ringers infusion   Intravenous Continuous     Facility-Administered Medications Ordered in Other Encounters   Medication Dose Route Frequency    lactated ringers infusion   Intravenous Continuous PRN       Allergies: Allergies   Allergen Reactions    Aspirin BLEEDING       Past Medical History:   Diagnosis Date    Anxiety     COVID 09/22/2023    no fever , just tired    Disorder of thyroid     Diverticulosis     Hearing impairment     bilateral hearing aids    Heartburn     High blood pressure     High cholesterol     History of blood transfusion 05/2023    History of cardiac murmur     History of palpitations 05/18/2022    History of stomach ulcers     HTN (hypertension)     Hyperlipidemia     Osteoarthritis     Prediabetes     Tinnitus     Visual impairment     glasses    Wears glasses      Past Surgical History:   Procedure Laterality Date    CARPAL TUNNEL RELEASE Right     CHOLECYSTECTOMY      COLONOSCOPY N/A 05/30/2023    Procedure: COLONOSCOPY;  Surgeon: Juanita Funes DO;  Location: [facility] ENDOSCOPY    HYSTERECTOMY      KNEE REPLACEMENT SURGERY      OTHER SURGICAL HISTORY      PLATER WARTS    TOTAL KNEE REPLACEMENT Right 05/23/2023    UPPER GI ENDOSCOPY,EXAM       History reviewed. No pertinent family history. Social History     Tobacco Use    Smoking status: Never    Smokeless tobacco: Never   Substance Use Topics    Alcohol use: Yes     Comment: 1 glass of wine nightly       SYSTEM Check if Review is Normal Check if Physical Exam is Normal If not normal, please explain:   HEENT [ x] [x ]    NECK & BACK [ x] [ x]    HEART [ x] [x ]    LUNGS [ x] [ x]    ABDOMEN [ x] [x ]    UROGENITAL [ n/a] [ n/a]    EXTREMITIES [ x] [x ]    OTHER        [ x ] I have discussed the risks and benefits and alternatives with the patient/family. They understand and agree to proceed with plan of care. [ x ] I have reviewed the History and Physical done within the last 30 days. Any changes noted above.     [facility],   11/27/2023  1:24 PM

## 2023-11-27 NOTE — DISCHARGE INSTRUCTIONS
Home Care Instructions for Gastroscopy/EUS with Sedation    Diet:  - Resume your regular diet as tolerated unless otherwise instructed. - Start with light meals to minimize bloating.  - Do not drink alcohol today. Medication:  - If you have questions about resuming your normal medications, please contact your Primary Care Physician. Activities:  - Take it easy today. Do not return to work today. - Do not drive today. - Do not operate any machinery today (including kitchen equipment). Gastroscopy/ EUS:  - You may have a sore throat for 2-3 days following the exam. This is normal. Gargling with warm salt water (1/2 tsp salt to 1 glass warm water) or using throat lozenges will help. - If you experience any sharp pain in your neck, abdomen or chest, vomiting of blood, oral temperature over 100 degrees Fahrenheit, light-headedness or dizziness, or any other problems, contact your doctor. **If unable to reach your doctor, please go to the [facility] Emergency Room**    - Your referring physician will receive a full report of your examination.  - If you do not hear from your doctor's office within two weeks of your biopsy, please call them for your results. You may be able to see your laboratory results in [facility] between 4 and 7 business days. In some cases, your physician may not have viewed the results before they are released to [address] If you have questions regarding your results contact the physician who ordered the test/exam by phone or via [address] by choosing \"Ask a Medical Question. \"

## 2024-01-03 DIAGNOSIS — F41.9 ANXIETY AND DEPRESSION: ICD-10-CM

## 2024-01-03 DIAGNOSIS — F32.A ANXIETY AND DEPRESSION: ICD-10-CM

## 2024-01-04 RX ORDER — ALPRAZOLAM 0.5 MG/1
0.5 TABLET ORAL 2 TIMES DAILY PRN
Qty: 60 TABLET | Refills: 0 | Status: SHIPPED | OUTPATIENT
Start: 2024-01-04

## 2024-02-03 DIAGNOSIS — F41.9 ANXIETY AND DEPRESSION: ICD-10-CM

## 2024-02-03 DIAGNOSIS — F32.A ANXIETY AND DEPRESSION: ICD-10-CM

## 2024-02-05 RX ORDER — ALPRAZOLAM 0.5 MG/1
0.5 TABLET ORAL 2 TIMES DAILY PRN
Qty: 60 TABLET | Refills: 0 | Status: SHIPPED | OUTPATIENT
Start: 2024-02-05

## 2024-02-23 ENCOUNTER — HOSPITAL ENCOUNTER (OUTPATIENT)
Age: 84
Discharge: HOME | End: 2024-02-23
Payer: MEDICARE

## 2024-02-23 VITALS
SYSTOLIC BLOOD PRESSURE: 166 MMHG | BODY MASS INDEX: 28.35 KG/M2 | WEIGHT: 160 LBS | OXYGEN SATURATION: 96 % | DIASTOLIC BLOOD PRESSURE: 80 MMHG | HEART RATE: 64 BPM | HEIGHT: 63 IN | TEMPERATURE: 97 F | RESPIRATION RATE: 16 BRPM

## 2024-02-23 DIAGNOSIS — H61.21 IMPACTED CERUMEN OF RIGHT EAR: ICD-10-CM

## 2024-02-23 DIAGNOSIS — I10 PRIMARY HYPERTENSION: ICD-10-CM

## 2024-02-23 DIAGNOSIS — H66.91 RIGHT OTITIS MEDIA, UNSPECIFIED OTITIS MEDIA TYPE: Primary | ICD-10-CM

## 2024-02-23 PROCEDURE — 99213 OFFICE O/P EST LOW 20 MIN: CPT

## 2024-02-23 RX ORDER — AMOXICILLIN AND CLAVULANATE POTASSIUM 875; 125 MG/1; MG/1
1 TABLET, FILM COATED ORAL 2 TIMES DAILY
Qty: 14 TABLET | Refills: 0 | Status: SHIPPED | OUTPATIENT
Start: 2024-02-23 | End: 2024-03-01

## 2024-02-23 NOTE — ED PROVIDER NOTES
History     Chief Complaint   Patient presents with    Ear Problem Pain       Subjective:   HPI    Erica Schwartz, 83 year old female with notable medical history of HTN, hearing loss who presents with Right cerumen impaction. Patient reports going to get her hearing aids adjusted and told she had cerumen impaction. She reports chronic hearing loss, but reports worsening symptoms over the past couple weeks, with now also having discomfort. Denies fever, chills, sore throat, URI symptoms.        Objective:   Past Medical History:   Diagnosis Date    Anxiety     COVID 09/22/2023    no fever , just tired    Disorder of thyroid     Diverticulosis     Hearing impairment     bilateral hearing aids    Heartburn     High blood pressure     High cholesterol     History of blood transfusion 05/2023    History of cardiac murmur     History of palpitations 05/18/2022    History of stomach ulcers     HTN (hypertension)     Hyperlipidemia     Osteoarthritis     Prediabetes     Tinnitus     Visual impairment     glasses    Wears glasses               Past Surgical History:   Procedure Laterality Date    CARPAL TUNNEL RELEASE Right     CHOLECYSTECTOMY      COLONOSCOPY N/A 05/30/2023    Procedure: COLONOSCOPY;  Surgeon: Kevin Shane DO;  Location:  ENDOSCOPY    HYSTERECTOMY      KNEE REPLACEMENT SURGERY      OTHER SURGICAL HISTORY      PLATER WARTS    TOTAL KNEE REPLACEMENT Right 05/23/2023    UPPER GI ENDOSCOPY,EXAM                  Social History     Socioeconomic History    Marital status:    Tobacco Use    Smoking status: Never    Smokeless tobacco: Never   Vaping Use    Vaping Use: Never used   Substance and Sexual Activity    Alcohol use: Yes     Comment: 1 glass of wine nightly    Drug use: Never              No current facility-administered medications on file prior to encounter.     Current Outpatient Medications on File Prior to Encounter   Medication Sig Dispense Refill    ALPRAZolam 0.5 MG Oral Tab  Take 1 tablet (0.5 mg total) by mouth 2 (two) times daily as needed for Sleep or Anxiety. 60 tablet 0    furosemide 40 MG Oral Tab Take 1 tablet (40 mg total) by mouth 2 (two) times daily.      sertraline 100 MG Oral Tab Take 1 tablet (100 mg total) by mouth daily.      losartan 25 MG Oral Tab Take 1 tablet (25 mg total) by mouth daily.      simvastatin 20 MG Oral Tab Take 1 tablet (20 mg total) by mouth nightly. 90 tablet 0    amLODIPine Besy-Benazepril HCl 5-10 MG Oral Cap Take 1 capsule by mouth daily. 90 capsule 0    nadolol 40 MG Oral Tab Take 1 tablet (40 mg total) by mouth daily.      B Complex-C-Folic Acid Oral Tab Take by mouth. (Patient not taking: Reported on 10/20/2023)      Zinc 50 MG Oral Tab Take by mouth. (Patient not taking: Reported on 10/20/2023)      cyanocobalamin 1000 MCG Oral Tab Take 1 tablet (1,000 mcg total) by mouth daily. (Patient not taking: Reported on 10/20/2023)      Vitamin C 500 MG Oral Tab Take 2 tablets (1,000 mg total) by mouth daily. (Patient not taking: Reported on 10/20/2023)      cholecalciferol 50 MCG (2000 UT) Oral Cap Take 1 capsule (2,000 Units total) by mouth daily. (Patient not taking: Reported on 10/20/2023)           Review of Systems   HENT:  Positive for ear pain.    All other systems reviewed and are negative.        Constitutional and vital signs reviewed.      All other systems reviewed and negative except as noted above.    I have reviewed the family history, social history, allergies, and outpatient medications.     History reviewed from EMR: Encounters, problem list, allergies, medications      Physical Exam     ED Triage Vitals [02/23/24 1321]   BP (!) 166/80   Pulse 64   Resp 16   Temp 97.2 °F (36.2 °C)   Temp src Temporal   SpO2 96 %   O2 Device None (Room air)       Current:BP (!) 166/80   Pulse 64   Temp 97.2 °F (36.2 °C) (Temporal)   Resp 16   Ht 160 cm (5' 3\")   Wt 72.6 kg   SpO2 96%   BMI 28.34 kg/m²       Physical Exam  Vitals and nursing note  reviewed.   Constitutional:       General: She is not in acute distress.     Appearance: Normal appearance. She is normal weight. She is not ill-appearing or toxic-appearing.   HENT:      Head: Normocephalic and atraumatic.      Right Ear: External ear normal. There is impacted cerumen.      Left Ear: External ear normal.      Nose: Nose normal.      Mouth/Throat:      Mouth: Mucous membranes are moist.   Eyes:      Extraocular Movements: Extraocular movements intact.      Conjunctiva/sclera: Conjunctivae normal.      Pupils: Pupils are equal, round, and reactive to light.   Cardiovascular:      Rate and Rhythm: Normal rate.      Pulses: Normal pulses.   Pulmonary:      Effort: Pulmonary effort is normal. No respiratory distress.   Musculoskeletal:         General: No swelling, tenderness or signs of injury. Normal range of motion.      Cervical back: Normal range of motion and neck supple.   Skin:     General: Skin is warm and dry.      Capillary Refill: Capillary refill takes less than 2 seconds.      Coloration: Skin is not jaundiced.   Neurological:      General: No focal deficit present.      Mental Status: She is alert and oriented to person, place, and time. Mental status is at baseline.   Psychiatric:         Mood and Affect: Mood normal.         Behavior: Behavior normal.         Thought Content: Thought content normal.         Judgment: Judgment normal.            ED Course     Labs Reviewed - No data to display  No orders to display       Vitals:    02/23/24 1321   BP: (!) 166/80   Pulse: 64   Resp: 16   Temp: 97.2 °F (36.2 °C)   TempSrc: Temporal   SpO2: 96%   Weight: 72.6 kg   Height: 160 cm (5' 3\")            LINDSAY Schwartz, 83 year old female with medical history as noted above who presents with ear pain / cerumen impaction   - Patient in NAD, VSS (BP elevated with chronic HTN)    - cerumen impaction vs otitis media vs other   - Attempted manual removal of hardened cerumen w/ patient  reporting pain. Will soak and attempt flush for removal.      ED Course as of 02/23/24 1515  ------------------------------------------------------------  Time: 02/23 1511  Comment: TM reinspected after successful irrigation and removal of cerumen. TM with bulging and erythema; will treat for otitis  Ear hygiene discussed  F/u with care team as needed        ** See ED course for additional information on care provided / interventions / notable events throughout patient's encounter.    ** I have independently reviewed the radiology images, clinical lab results, and ECG tracings as described above (if applicable)    ** See below for home care instructions (if applicable)          Medical Decision Making  Risk  OTC drugs.  Prescription drug management.        Disposition and Plan     Clinical Impression:  1. Right otitis media, unspecified otitis media type    2. Impacted cerumen of right ear    3. Primary hypertension         Disposition:  Discharge  2/23/2024  3:14 pm    Follow-up:  Blake Perez MD  [address] [facility] [address]  [phone]      Ear, Nose, Throat referral (as needed)        Medications Prescribed:  Current Discharge Medication List        START taking these medications    Details   amoxicillin clavulanate 875-125 MG Oral Tab Take 1 tablet by mouth 2 (two) times daily for 7 days.  Qty: 14 tablet, Refills: 0             The above patient (and/or guardian) was made aware that an appropriate evaluation has been performed, and that no additional testing is required at this time. In my medical judgment, there is currently no evidence of an immediate life-threatening or surgical condition, therefore discharge is indicated at this time. The patient (and/or guardian) was advised that a small risk still exists that a serious condition could develop. The patient was instructed to arrange close follow-up with their primary care provider (or the referral provider given today). The patient  received written and verbal instructions regarding their condition / concerns, demonstrated understanding, and is agreement with the outpatient treatment plan.        Home care instructions:    Ear infection care measures   - Take antibiotics as directed and to completion   - Ibuprofen or Tylenol as needed for pain   - Avoid having water run into or sit in your ear   - Avoid wearing Right sided hearing aid until symptoms improve   - Do not submerge your ear in water until symptoms resolve   - Do not put anything in your ear farther than you can see    - Follow up with your doctor (or ENT specialist) as needed      Jose Coppola, DNP, APRN, AGACNP-BC, FNP-C, CNL  Adult-Gerontology Acute Care & Family Nurse Practitioner  [facility]

## 2024-02-23 NOTE — DISCHARGE INSTRUCTIONS
Ear infection care measures   - Take antibiotics as directed and to completion   - Ibuprofen or Tylenol as needed for pain   - Avoid having water run into or sit in your ear   - Avoid wearing Right sided hearing aid until symptoms improve   - Do not submerge your ear in water until symptoms resolve   - Do not put anything in your ear farther than you can see    - Follow up with your doctor (or ENT specialist) as needed

## 2024-03-01 ENCOUNTER — OFFICE VISIT (OUTPATIENT)
Dept: INTERNAL MEDICINE CLINIC | Facility: CLINIC | Age: 84
End: 2024-03-01
Payer: MEDICARE

## 2024-03-01 VITALS
BODY MASS INDEX: 29.35 KG/M2 | HEIGHT: 63 IN | DIASTOLIC BLOOD PRESSURE: 74 MMHG | TEMPERATURE: 97 F | HEART RATE: 68 BPM | RESPIRATION RATE: 18 BRPM | OXYGEN SATURATION: 97 % | SYSTOLIC BLOOD PRESSURE: 126 MMHG | WEIGHT: 165.63 LBS

## 2024-03-01 DIAGNOSIS — H90.0 CONDUCTIVE HEARING LOSS, BILATERAL: ICD-10-CM

## 2024-03-01 DIAGNOSIS — H66.90 ACUTE OTITIS MEDIA, UNSPECIFIED OTITIS MEDIA TYPE: ICD-10-CM

## 2024-03-01 DIAGNOSIS — H61.22 IMPACTED CERUMEN OF LEFT EAR: Primary | ICD-10-CM

## 2024-03-01 NOTE — PROGRESS NOTES
Erica Schwartz  4/23/1940    Chief Complaint   Patient presents with    Follow - Up     TA RM12 Follow up for ear infection.       SUBJECTIVE   Erica Schwartz is a 83 year old female w/ hearing aids who presents for IC follow up.    She was seen on 2/23 for cerumen impaction s/p irrigation and Rx Augmentin for otitis media.    Ear feels like it's ' healing '.     Review of Systems   Review of Systems   No f/c/chest pain or sob. No cough. No abd pain/n/v/d. No ha or dizziness. No numbness, tingling, or weakness. No other complaints today.    OBJECTIVE:   /74   Pulse 68   Temp 97.4 °F (36.3 °C)   Resp 18   Ht 5' 3\" (1.6 m)   Wt 165 lb 9.6 oz (75.1 kg)   SpO2 97%   BMI 29.33 kg/m²   Physical Exam   Constitutional: Oriented to person, place, and time. No distress.   HEENT:  Normocephalic and atraumatic. Left ear impacted w/ cerumen. Small amount of cerumen in right ear. No evidence of otitis media.  Nose normal. Oropharynx is clear and moist.   Eyes: Conjunctivae wnl.   Neck: Normal range of motion. Neck supple.   Cardiovascular: Normal rate, regular rhythm and intact distal pulses.  No murmur, rubs or gallops.   Pulmonary/Chest: Effort normal and breath sounds normal. No respiratory distress.  Cerumen Removal:  Procedure:  Removal of cerumen of right canal  Indication:  Otoscopic examination of the tympanic membrane is not possible due to cerumen obstruction in the ear canal.  The removal of impacted cerumen required the expertise of a physician or non physician provider and was personally performed by the physician or non physician provider.  Procedure Note:  The cerumen was removed by warm water irrigation and use of curette.  The procedure was tolerated well and there were no complications.       Lab Results   Component Value Date     (H) 06/02/2023    BUN 7 06/02/2023    CREATSERUM 0.63 06/02/2023    ANIONGAP 7 06/02/2023    GFRAA 78 05/23/2022    GFRNAA 68 05/23/2022    CA 8.0 (L)  06/02/2023     06/02/2023    K 3.6 06/02/2023     06/02/2023    CO2 27.0 06/02/2023    OSMOCALC 297 (H) 06/02/2023      Lab Results   Component Value Date    WBC 6.7 06/20/2023    RBC 4.02 06/20/2023    HGB 12.9 08/15/2023    HCT 40.6 08/15/2023    MCV 95.0 06/20/2023    MCH 28.4 06/20/2023    MCHC 29.8 (L) 06/20/2023    RDW 15.7 06/20/2023    .0 06/20/2023      Lab Results   Component Value Date    TSH 1.560 05/23/2022        ASSESSMENT AND PLAN:       ICD-10-CM    1. Impacted cerumen of left ear  H61.22 ENT Referral - In Network      2. Conductive hearing loss, bilateral  H90.0 REMOVAL IMPACTED CERUMEN USING IRRIGATION/LAVAGE, UNILATERAL     ENT Referral - In Network      3. Acute otitis media, unspecified otitis media type  H66.90 Resolved, no evidence of otitis media in either ear.        The patient indicates understanding of these issues and agrees to the plan.  The patient is asked to return or present to the emergency room for worsening of symptoms.    TODAY'S ORDERS     No orders of the defined types were placed in this encounter.      Meds & Refills:  Requested Prescriptions      No prescriptions requested or ordered in this encounter       Imaging & Consults:  None    No follow-ups on file.  There are no Patient Instructions on file for this visit.    All questions were answered and the patient agrees with the plan.     Thank you,  Nomi Weber,

## 2024-03-02 DIAGNOSIS — F41.9 ANXIETY AND DEPRESSION: ICD-10-CM

## 2024-03-02 DIAGNOSIS — F32.A ANXIETY AND DEPRESSION: ICD-10-CM

## 2024-03-04 RX ORDER — ALPRAZOLAM 0.5 MG/1
0.5 TABLET ORAL 2 TIMES DAILY PRN
Qty: 60 TABLET | Refills: 0 | Status: SHIPPED | OUTPATIENT
Start: 2024-03-04

## 2024-03-28 DIAGNOSIS — F41.9 ANXIETY AND DEPRESSION: ICD-10-CM

## 2024-03-28 DIAGNOSIS — F32.A ANXIETY AND DEPRESSION: ICD-10-CM

## 2024-03-28 RX ORDER — ALPRAZOLAM 0.5 MG/1
0.5 TABLET ORAL 2 TIMES DAILY PRN
Qty: 60 TABLET | Refills: 0 | Status: SHIPPED | OUTPATIENT
Start: 2024-03-28

## 2024-03-28 NOTE — TELEPHONE ENCOUNTER
Last refilled 3/4/24 #60 to take BID PRN for sleep/anxiety.     AD, would you refill? OV to discuss frequent use?

## 2024-04-23 RX ORDER — ALPRAZOLAM 0.25 MG/1
0.25 TABLET ORAL
Qty: 30 TABLET | Refills: 0 | OUTPATIENT
Start: 2024-04-23

## 2024-04-29 DIAGNOSIS — F32.A ANXIETY AND DEPRESSION: ICD-10-CM

## 2024-04-29 DIAGNOSIS — F41.9 ANXIETY AND DEPRESSION: ICD-10-CM

## 2024-04-30 RX ORDER — ALPRAZOLAM 0.5 MG/1
0.5 TABLET ORAL 2 TIMES DAILY PRN
Qty: 60 TABLET | Refills: 0 | Status: SHIPPED | OUTPATIENT
Start: 2024-04-30

## 2024-05-29 DIAGNOSIS — F32.A ANXIETY AND DEPRESSION: ICD-10-CM

## 2024-05-29 DIAGNOSIS — F41.9 ANXIETY AND DEPRESSION: ICD-10-CM

## 2024-05-30 ENCOUNTER — TELEPHONE (OUTPATIENT)
Dept: ORTHOPEDICS CLINIC | Facility: CLINIC | Age: 84
End: 2024-05-30

## 2024-05-30 DIAGNOSIS — Z96.651 STATUS POST RIGHT KNEE REPLACEMENT: Primary | ICD-10-CM

## 2024-05-30 NOTE — TELEPHONE ENCOUNTER
Xr's ordered for upcoming appointment    Called patient to inform her of the following   Patient aware to arrive 15-20 minutes earlier to complete images before seeing he provider as her alternative contact sates she will inform her.    Location date and time verified with patient

## 2024-05-31 RX ORDER — ALPRAZOLAM 0.5 MG/1
0.5 TABLET ORAL 2 TIMES DAILY PRN
Qty: 60 TABLET | Refills: 0 | Status: SHIPPED | OUTPATIENT
Start: 2024-05-31

## 2024-05-31 NOTE — TELEPHONE ENCOUNTER
Please review; protocol failed/No Protocol    Recent Fills: 03/04/2024, 04/04/2024, 04/30/2024 - due to be filled on June 2, 2024 based on 30 day supply.     Last Rx Written: 04/30/2024    Last Office Visit: 03/01/2024    Requested Prescriptions   Pending Prescriptions Disp Refills    ALPRAZolam 0.5 MG Oral Tab 60 tablet 0     Sig: Take 1 tablet (0.5 mg total) by mouth 2 (two) times daily as needed for Sleep or Anxiety.       Controlled Substance Medication Failed - 5/31/2024  9:32 AM        Failed - This medication is a controlled substance - forward to provider to refill           Future Appointments         Provider Department Appt Notes    In 3 days [facility] X-ray - [place]     In 3 days Tom Mao MD [facility], [address] [place] [address] 1 yr POST OP RT TKA DOS 5/23/23          Recent Outpatient Visits              3 months ago Impacted cerumen of left ear    [address] Nomi Weber DO    Office Visit    7 months ago Encounter for annual health examination    [address] Wu Lazo MD    Office Visit    9 months ago     [facility] Norris Brooks, CHRISTIANA    Office Visit    10 months ago     [facility] Norris Brooks, PT    Office Visit    10 months ago     [facility] Norris Reyes, PT    Office Visit

## 2024-05-31 NOTE — TELEPHONE ENCOUNTER
Patient daughter-ok per HIPAA calling to check on status of refill. States that patient only has 2 pills left, requesting refill asap.

## 2024-06-03 ENCOUNTER — HOSPITAL ENCOUNTER (OUTPATIENT)
Dept: GENERAL RADIOLOGY | Age: 84
Discharge: HOME | End: 2024-06-03
Attending: ORTHOPAEDIC SURGERY
Payer: MEDICARE

## 2024-06-03 ENCOUNTER — OFFICE VISIT (OUTPATIENT)
Dept: ORTHOPEDICS CLINIC | Facility: CLINIC | Age: 84
End: 2024-06-03
Payer: MEDICARE

## 2024-06-03 DIAGNOSIS — Z96.651 STATUS POST RIGHT KNEE REPLACEMENT: Primary | ICD-10-CM

## 2024-06-03 DIAGNOSIS — G89.29 CHRONIC LOW BACK PAIN, UNSPECIFIED BACK PAIN LATERALITY, UNSPECIFIED WHETHER SCIATICA PRESENT: ICD-10-CM

## 2024-06-03 DIAGNOSIS — Z96.651 STATUS POST RIGHT KNEE REPLACEMENT: ICD-10-CM

## 2024-06-03 DIAGNOSIS — M54.50 CHRONIC LOW BACK PAIN, UNSPECIFIED BACK PAIN LATERALITY, UNSPECIFIED WHETHER SCIATICA PRESENT: ICD-10-CM

## 2024-06-03 PROCEDURE — 73562 X-RAY EXAM OF KNEE 3: CPT | Performed by: ORTHOPAEDIC SURGERY

## 2024-06-03 PROCEDURE — 99213 OFFICE O/P EST LOW 20 MIN: CPT | Performed by: ORTHOPAEDIC SURGERY

## 2024-06-03 NOTE — PROGRESS NOTES
[facility] Progress Note    Subjective: Very pleasant 84-year-old female who returns to clinic for follow-up on her right knee.  She states that the knee replacement is doing \"awesome\".  She has no functional limitations or pain.  She is very happy with her outcome.  She does state that her back has become more bothersome, has been a problem in the past but has gotten somewhat worse.  Notes that as she is walking she needs to bend forward due to pain in the low back.    Objective: Patient is very pleasant, appears comfortable.  Right knee incision is well-healed.  Range of motion is 0-1 15.  Knee is appropriately stable to varus and valgus stress throughout range of motion.      Imaging: X-rays of the right knee personally viewed, independently interpreted and radiology report read.  Demonstrates cemented right total knee arthroplasty unchanged from films 1 year ago.  No lucencies about bone cement implant interface.  No asymmetric narrowing on the AP view.  Patella tracking centrally.      Assessment/Plan: 84-year-old female 1 year postop status post right knee replacement.  Patient is doing very well from the knee replacement standpoint.  No restrictions on the knee, continue activities as tolerated.  Follow-up in 5 to 10 years with repeat x-rays.  She does have chronic low back pain, states that this is a limiting factor for her.  Did offer physical therapy, she prefers to continue working on her exercise program on her own 3 times a week.  She did ask for disability placard which we did provide for temporary 6 months due to diagnosis of chronic low back pain.    Tom Mao MD, [facility]  [facility] Orthopedic Surgery  Phone [phone]  Fax [phone]

## 2024-06-30 DIAGNOSIS — F41.9 ANXIETY AND DEPRESSION: ICD-10-CM

## 2024-06-30 DIAGNOSIS — F32.A ANXIETY AND DEPRESSION: ICD-10-CM

## 2024-07-02 RX ORDER — ALPRAZOLAM 0.5 MG/1
0.5 TABLET ORAL 2 TIMES DAILY PRN
Qty: 60 TABLET | Refills: 0 | Status: SHIPPED | OUTPATIENT
Start: 2024-07-02

## 2024-07-02 NOTE — TELEPHONE ENCOUNTER
Triage call transferred.   Spoke with pt's daughter, francie (Ok per HIPAA), stating pt's been out of Alprazolam since 6/30/24 and requesting refill today.  Informed will relay to PCP request. No further questions or concerns. Daughter verbalized understanding and expressed appreciation.     Rx pended for your approval if ok.  Please review and advise. Thank you.

## 2024-07-02 NOTE — TELEPHONE ENCOUNTER
Erica Schwartz requesting Medication Refill for:    Medication name and dose (copy and paste from medication list):   Medication Quantity Refills Start End   ALPRAZolam 0.5 MG Oral Tab 60 tablet 0 5/31/2024 --   Sig:   Take 1 tablet (0.5 mg total) by mouth 2 (two) times daily as needed for Sleep or Anxiety.     Route:   Oral     PRN Reason(s):   Sleep, Anxiety     Order #:   [id number]         If medication is not on medication list - transfer patient to RN queue for triage    Preferred Pharmacy:   [id number] [place] - [address] AT [facility] OF [address], [phone], [phone]   [address] [place] [address]   Phone: [phone] Fax: [phone]   Hours: Open 24 hours       LOV: 10/20/2023   Last Refill date: 5/31/24  Next Scheduled appointment: No future appointments.

## 2024-07-20 ENCOUNTER — HOSPITAL ENCOUNTER (OUTPATIENT)
Age: 84
Discharge: HOME | End: 2024-07-20
Payer: MEDICARE

## 2024-07-20 VITALS
TEMPERATURE: 98 F | OXYGEN SATURATION: 99 % | SYSTOLIC BLOOD PRESSURE: 133 MMHG | HEART RATE: 65 BPM | HEIGHT: 62 IN | WEIGHT: 167 LBS | BODY MASS INDEX: 30.73 KG/M2 | DIASTOLIC BLOOD PRESSURE: 80 MMHG | RESPIRATION RATE: 18 BRPM

## 2024-07-20 DIAGNOSIS — T14.8XXA ABRASION: ICD-10-CM

## 2024-07-20 DIAGNOSIS — H61.21 IMPACTED CERUMEN OF RIGHT EAR: Primary | ICD-10-CM

## 2024-07-20 PROCEDURE — 99213 OFFICE O/P EST LOW 20 MIN: CPT

## 2024-07-20 PROCEDURE — 69209 REMOVE IMPACTED EAR WAX UNI: CPT

## 2024-07-20 RX ORDER — CIPROFLOXACIN AND DEXAMETHASONE 3; 1 MG/ML; MG/ML
4 SUSPENSION/ DROPS AURICULAR (OTIC) 2 TIMES DAILY
Qty: 1 EACH | Refills: 0 | Status: SHIPPED | OUTPATIENT
Start: 2024-07-20 | End: 2024-07-21

## 2024-07-20 RX ORDER — CIPROFLOXACIN AND DEXAMETHASONE 3; 1 MG/ML; MG/ML
4 SUSPENSION/ DROPS AURICULAR (OTIC) 2 TIMES DAILY
Qty: 1 EACH | Refills: 0 | Status: SHIPPED | OUTPATIENT
Start: 2024-07-20 | End: 2024-07-20

## 2024-07-20 NOTE — ED PROVIDER NOTES
Patient Seen in: [facility]      History     Chief Complaint   Patient presents with    Ear Problem Pain     Stated Complaint: ears cleaned prior to surgery    Subjective:   HPI    84-year-old female presents today to have her right ear canal cleaned out prior to being fitted for her hearing aid to the right side.      Objective:   Past Medical History:    Anxiety    COVID    no fever , just tired    Disorder of thyroid    Diverticulosis    Hearing impairment    bilateral hearing aids    Heartburn    High blood pressure    High cholesterol    History of blood transfusion    History of cardiac murmur    History of palpitations    History of stomach ulcers    HTN (hypertension)    Hyperlipidemia    Osteoarthritis    Prediabetes    Tinnitus    Visual impairment    glasses    Wears glasses              Past Surgical History:   Procedure Laterality Date    Carpal tunnel release Right     Cholecystectomy      Colonoscopy N/A 05/30/2023    Procedure: COLONOSCOPY;  Surgeon: Kevin Shane DO;  Location:  ENDOSCOPY    Hysterectomy      Knee replacement surgery      Other surgical history      PLATER WARTS    Total knee replacement Right 05/23/2023    Upper gi endoscopy,exam                  Social History     Socioeconomic History    Marital status:    Tobacco Use    Smoking status: Never    Smokeless tobacco: Never   Vaping Use    Vaping status: Never Used   Substance and Sexual Activity    Alcohol use: Yes     Comment: 1 glass of wine nightly    Drug use: Never              Review of Systems   Constitutional: Negative.    HENT:  Positive for hearing loss.    Eyes: Negative.    Respiratory: Negative.     Cardiovascular: Negative.    Gastrointestinal: Negative.    Endocrine: Negative.    Genitourinary: Negative.    Musculoskeletal: Negative.    Skin: Negative.    Allergic/Immunologic: Negative.    Neurological: Negative.    Hematological: Negative.    Psychiatric/Behavioral: Negative.          Positive for stated Chief Complaint: Ear Problem Pain    Other systems are as noted in HPI.  Constitutional and vital signs reviewed.      All other systems reviewed and negative except as noted above.    Physical Exam     ED Triage Vitals [07/20/24 1023]   /80   Pulse 65   Resp 18   Temp 98 °F (36.7 °C)   Temp src Temporal   SpO2 99 %   O2 Device None (Room air)       Current Vitals:   Vital Signs  BP: 133/80  Pulse: 65  Resp: 18  Temp: 98 °F (36.7 °C)  Temp src: Temporal    Oxygen Therapy  SpO2: 99 %  O2 Device: None (Room air)            Physical Exam  Vitals and nursing note reviewed.   Constitutional:       Appearance: Normal appearance.   HENT:      Head: Normocephalic.      Right Ear: Tympanic membrane, ear canal and external ear normal.      Left Ear: Tympanic membrane, ear canal and external ear normal.      Ears:      Comments: There is some hard brown wax around the right ear canal that is not obstructing the TM.  Eyes:      Extraocular Movements: Extraocular movements intact.      Conjunctiva/sclera: Conjunctivae normal.      Pupils: Pupils are equal, round, and reactive to light.   Pulmonary:      Effort: Pulmonary effort is normal.   Neurological:      Mental Status: She is alert.   Psychiatric:         Mood and Affect: Mood normal.             ED Course   Labs Reviewed - No data to display                   MDM      84-year-old female presents today to have her right ear canal cleaned out prior to being fitted for her hearing aid to the right side.    Vital signs: Please see EMR.  Physical exam: Please see exam.  Differential diagnosis cerumen impaction, otitis media, otitis externa.  There is some bleeding after irrigation.  Patient states to feel slightly tender.  TM is intact.  The bleeding appears to be coming from the canal at 5:00.  This is likely where the earwax was stuck to the sides.  Will place patient on eardrop prophylactically and instruct patient to withhold hearing aid in  this ear until seen by ear nose and throat specialist.  Based on physical exam and HPI will diagnosed with earwax removal.  Patient is to follow-up with her ear nose and throat specialist as directed.                                   Medical Decision Making  84-year-old female presents today to have her right ear canal cleaned out prior to being fitted for her hearing aid to the right side.      Problems Addressed:  Abrasion: acute illness or injury  Impacted cerumen of right ear: acute illness or injury    Amount and/or Complexity of Data Reviewed  ECG/medicine tests: ordered. Decision-making details documented in ED Course.    Risk  Prescription drug management.        Disposition and Plan     Clinical Impression:  1. Impacted cerumen of right ear    2. Abrasion         Disposition:  Discharge  7/20/2024 11:30 am    Follow-up:  Wu Lazo MD  [address] [place] [address]  [phone]    In 1 week  As needed          Medications Prescribed:  Current Discharge Medication List        START taking these medications    Details   ciprofloxacin-dexamethasone (CIPRODEX) 0.3-0.1 % Otic Suspension Place 4 drops into the right ear 2 (two) times daily for 19 days.  Qty: 1 each, Refills: 0

## 2024-07-21 RX ORDER — POLYMYXIN B SULFATE AND TRIMETHOPRIM 1; 10000 MG/ML; [USP'U]/ML
1 SOLUTION OPHTHALMIC 4 TIMES DAILY
Qty: 10 ML | Refills: 0 | Status: SHIPPED | OUTPATIENT
Start: 2024-07-21 | End: 2024-07-21

## 2024-07-23 DIAGNOSIS — F41.9 ANXIETY AND DEPRESSION: ICD-10-CM

## 2024-07-23 DIAGNOSIS — F32.A ANXIETY AND DEPRESSION: ICD-10-CM

## 2024-07-25 RX ORDER — ALPRAZOLAM 0.5 MG/1
0.5 TABLET ORAL 2 TIMES DAILY PRN
Qty: 60 TABLET | Refills: 0 | Status: SHIPPED | OUTPATIENT
Start: 2024-07-25

## 2024-07-25 NOTE — TELEPHONE ENCOUNTER
Please review. Protocol Failed; No Protocol      Recent fills: 4/30/2024, 5/31/2024, 7/2/2024  Last Rx written: 7/2/2024  DUE: 8/2/2024  Last office visit: 10/20/2023    Recent Visits  Date Type Provider Dept   03/01/24 Office Visit Nomi Weber, DO [facility] [address]     Future Appointments  Date Type Provider Dept   07/29/24 Appointment Nomi Weber DO [facility] [address]   Showing future appointments within next 150 days with a meds authorizing provider and meeting all other requirements        Requested Prescriptions   Pending Prescriptions Disp Refills    ALPRAZolam 0.5 MG Oral Tab 60 tablet 0     Sig: Take 1 tablet (0.5 mg total) by mouth 2 (two) times daily as needed for Anxiety or Sleep.       Controlled Substance Medication Failed - 7/23/2024 12:18 PM        Failed - This medication is a controlled substance - forward to provider to refill               Future Appointments         Provider Department Appt Notes    In 4 days Nomi Weber DO [address] F/U regarding ear wax          Recent Outpatient Visits              1 month ago Status post right knee replacement    [address] oTm Mao MD    Office Visit    4 months ago Impacted cerumen of left ear    [address]    Office Visit    9 months ago Encounter for annual health examination    [address] Wu Lazo MD    Office Visit    11 months ago     [facility] Norris Reyes, PT    Office Visit    11 months ago     [facility] Norris Reyes, PT    Office Visit

## 2024-07-30 ENCOUNTER — OFFICE VISIT (OUTPATIENT)
Dept: INTERNAL MEDICINE CLINIC | Facility: CLINIC | Age: 84
End: 2024-07-30
Payer: MEDICARE

## 2024-07-30 VITALS
TEMPERATURE: 97 F | BODY MASS INDEX: 31.03 KG/M2 | HEART RATE: 64 BPM | RESPIRATION RATE: 18 BRPM | OXYGEN SATURATION: 97 % | DIASTOLIC BLOOD PRESSURE: 74 MMHG | HEIGHT: 62 IN | WEIGHT: 168.63 LBS | SYSTOLIC BLOOD PRESSURE: 120 MMHG

## 2024-07-30 DIAGNOSIS — H90.3 SENSORINEURAL HEARING LOSS (SNHL) OF BOTH EARS: ICD-10-CM

## 2024-07-30 DIAGNOSIS — H61.891 IRRITATION OF RIGHT EXTERNAL AUDITORY CANAL: Primary | ICD-10-CM

## 2024-07-30 PROCEDURE — 99213 OFFICE O/P EST LOW 20 MIN: CPT | Performed by: INTERNAL MEDICINE

## 2024-07-30 PROCEDURE — G2211 COMPLEX E/M VISIT ADD ON: HCPCS | Performed by: INTERNAL MEDICINE

## 2024-07-30 RX ORDER — CIPROFLOXACIN AND DEXAMETHASONE 3; 1 MG/ML; MG/ML
3 SUSPENSION/ DROPS AURICULAR (OTIC) 2 TIMES DAILY
Qty: 7.5 ML | Refills: 0 | Status: SHIPPED | OUTPATIENT
Start: 2024-07-30 | End: 2024-08-02

## 2024-07-30 NOTE — PROGRESS NOTES
Erica Schwartz  4/23/1940    Chief Complaint   Patient presents with    Follow - Up     EJ RM 2- Pt is here for f/u on the earwax removal       SUBJECTIVE   Erica Schwartz is a 84 year old female who presents for evaluation of right ear pain. She was seen in IC on 7/20 for cerumen removal prior to hearing aid fitting. The ear was irrigated. She was Rx  prophylactic Polytrim drops kishor poss infection given bleeding seen after irrigation.    She went for fitting today and was told by audiologist that the ear may be infected and so device cannot be put in the right ear.    She has a history of bilateral sensorineural hearing loss. There is no worsening of hearing or tinnitus. The right ear still feels funny after the irrigation on 7/20.      Review of Systems   Review of Systems   No f/c/chest pain or sob. No cough. No abd pain/n/v/d. No ha or dizziness. No numbness, tingling, or weaknes    OBJECTIVE:   /74   Pulse 64   Temp 96.7 °F (35.9 °C) (Temporal)   Resp 18   Ht 5' 2\" (1.575 m)   Wt 168 lb 9.6 oz (76.5 kg)   SpO2 97%   BMI 30.84 kg/m²   Physical Exam   Constitutional: Oriented to person, place, and time. No distress.   HEENT:  Normocephalic and atraumatic. Bilateral tympanic membranes are normal appearing. The left external ear canal has very thin layer of adherent cerumen that is not obstructing the canal, small area of erythema. Poss thin layer of cerumen just in front of the TM. Nose normal. Oropharynx is clear and moist.   Cardiovascular: Normal rate, regular rhythm and intact distal pulses.  No murmur, rubs or gallops.   Pulmonary/Chest: Effort normal and breath sounds normal. No respiratory distress.    Lab Results   Component Value Date     (H) 06/02/2023    BUN 7 06/02/2023    CREATSERUM 0.63 06/02/2023    ANIONGAP 7 06/02/2023    GFRAA 78 05/23/2022    GFRNAA 68 05/23/2022    CA 8.0 (L) 06/02/2023     06/02/2023    K 3.6 06/02/2023     06/02/2023    CO2 27.0  06/02/2023    OSMOCALC 297 (H) 06/02/2023      Lab Results   Component Value Date    WBC 6.7 06/20/2023    RBC 4.02 06/20/2023    HGB 12.9 08/15/2023    HCT 40.6 08/15/2023    MCV 95.0 06/20/2023    MCH 28.4 06/20/2023    MCHC 29.8 (L) 06/20/2023    RDW 15.7 06/20/2023    .0 06/20/2023      Lab Results   Component Value Date    TSH 1.560 05/23/2022        ASSESSMENT AND PLAN:       ICD-10-CM    1. Irritation of right external auditory canal  H61.891 ciprofloxacin-dexamethasone 0.3-0.1 % Otic Suspension      2. Sensorineural hearing loss (SNHL) of both ears  H90.3       Will try a few additional days with antibiotic and steroid drop. Follow up here or w/ ENT for irrigation in the future. Should be okay for hearing aid fitting next week.      The patient indicates understanding of these issues and agrees to the plan.  The patient is asked to return or present to the emergency room for worsening of symptoms.    TODAY'S ORDERS     No orders of the defined types were placed in this encounter.      Meds & Refills:  Requested Prescriptions      No prescriptions requested or ordered in this encounter       Imaging & Consults:  None    No follow-ups on file.  There are no Patient Instructions on file for this visit.    All questions were answered and the patient agrees with the plan.     Thank you,  Nomi Weber,

## 2024-08-02 ENCOUNTER — TELEPHONE (OUTPATIENT)
Dept: INTERNAL MEDICINE CLINIC | Facility: CLINIC | Age: 84
End: 2024-08-02

## 2024-08-02 RX ORDER — NEOMYCIN SULFATE, POLYMYXIN B SULFATE, HYDROCORTISONE 3.5; 10000; 1 MG/ML; [USP'U]/ML; MG/ML
4 SOLUTION/ DROPS AURICULAR (OTIC) 3 TIMES DAILY
Qty: 1 EACH | Refills: 0 | Status: SHIPPED | OUTPATIENT
Start: 2024-08-02

## 2024-08-02 NOTE — TELEPHONE ENCOUNTER
Submitted PRIOR AUTHORIZATION through EPIC, [employer] for Cipro/Dexameth 0.3-0.1% Otic Susp.        Denied    Note from payer: Details of this decision are provided on the physician outcome notice which has been faxed to the number on file.  Payer: [facility] Case ID: [id number]    [phone]    [phone]  Electronic appeal: Not supported  View History  Pharmacy Benefits   Open Encounter [place] PD[place] VALUE [place]AL ([employer] [place]    Covered: Retail, Mail Order    Unknown: Specialty, Long-Term Care  Member ID: [id number] BIN: [id number] : 1940   Group ID: [id number] PCN: [id number] Legal sex: F   Group name:    Address: [address] [place] [address].[facility] [address]    Medication Being Authorized    ciprofloxacin-dexamethasone 0.3-0.1 % Otic Suspension  Place 3 drops into the right ear 2 (two) times daily.  Dispense: 7.5 mL Refills: 0   Start: 2024   Class: Normal Diagnoses: Irritation of right external auditory canal   This order has been released to its destination.  To be filled at: [id number]  [place] - [address] AT [facility] [address], [phone], [phone]

## 2024-08-02 NOTE — TELEPHONE ENCOUNTER
Spoke to colette Alexander's dtr in law on HIPAA who spoke with pt.  Pt states she has not tried to  the new prescription and would like to continue the old script and would like a refill of the Neomycin/Polymyxin/Hydrocortisone.  Pt told Catherine that her ear does feel better.  Catherine states is DR LEAH JACKSON would be able to send a refill script for Neomycin/Polymyxin/Hydrocortisone then she will go to the pharmacy to pick it up for patient.  Catherine will let us know if she has any problems or questions.

## 2024-08-02 NOTE — TELEPHONE ENCOUNTER
Pt LWBS Received denial for Ciprofloxacin-dexamethasone Otic suspension from [employer].  Pt must try and fail 1 other drug for the condition: only one alternative listed which is neomycin/polymyxin/hydrocortisone(otic suspension, otic solution).    LOV 7/30/24 with DR LEAH JACKSON.    DR LEAH JACKSON, do you want to order alternative?  Even with [employer] showing $75.95 cost, lowest is [facility] at $54.35.

## 2024-08-02 NOTE — TELEPHONE ENCOUNTER
She had been on Neomycin/Polymyxin/Hydrocortisone previously. We can continue. If she still have old Rx can continue w/ same instruction. If she needs refill I can send. Please let me know.

## 2024-08-05 NOTE — TELEPHONE ENCOUNTER
RN to Catherine call, she stated they were able to pickup the rx over the weekend.  Catherine appreciative of call.

## 2024-08-28 DIAGNOSIS — F32.A ANXIETY AND DEPRESSION: ICD-10-CM

## 2024-08-28 DIAGNOSIS — F41.9 ANXIETY AND DEPRESSION: ICD-10-CM

## 2024-08-29 NOTE — TELEPHONE ENCOUNTER
Please review; protocol failed/No Protocol    Recent Fills: 05/31/2024, 07/02/2024, 07/30/2024    Last Rx Written: 07/25/2024    Last Office Visit: 07/30/2024    Requested Prescriptions   Pending Prescriptions Disp Refills    ALPRAZOLAM 0.5 MG Oral Tab [Pharmacy Med Name: ALPRAZOLAM 0.5MG TABLETS] 60 tablet 0     Sig: TAKE 1 TABLET(0.5 MG) BY MOUTH TWICE DAILY AS NEEDED FOR ANXIETY OR SLEEP       Controlled Substance Medication Failed - 8/28/2024  9:27 AM        Failed - This medication is a controlled substance - forward to provider to refill             Recent Outpatient Visits              1 month ago Irritation of right external auditory canal    [facility], [address] [place], [place] [address]Saroj Maryl Louisa, DO    Office Visit    2 months ago Status post right knee replacement    [facility], [address] [place] [address], Tom Giles MD    Office Visit    6 months ago Impacted cerumen of left ear    [facility] [address] [place], [place] [address]Saroj Maryl Louisa, DO    Office Visit    10 months ago Encounter for annual health examination    [address] MD    Office Visit    1 year ago     [facility] - [facility] Norris Reyes, CHRISTIANA    Office Visit

## 2024-08-30 RX ORDER — ALPRAZOLAM 0.5 MG
0.5 TABLET ORAL 2 TIMES DAILY PRN
Qty: 60 TABLET | Refills: 0 | Status: SHIPPED | OUTPATIENT
Start: 2024-08-30 | End: 2024-09-23

## 2024-08-30 RX ORDER — ALPRAZOLAM 0.5 MG
0.5 TABLET ORAL 2 TIMES DAILY PRN
Qty: 60 TABLET | Refills: 0 | OUTPATIENT
Start: 2024-08-30

## 2024-08-30 NOTE — TELEPHONE ENCOUNTER
Dr. Jiang,    Please advise patient takes alprazolam twice daily.    Last written 7/25/2024 for qty #60 x 30 days    Patient picked up on 7/30/24    Patient should be due to refill today, 8/30/24.    Medication request is marked high priority: patient is out and would like to have over the holiday weekend.    Per GORDON Weeks 8/30/24:  Daughter in law Catherine called (ok per HIPAA).  Patient takes Alprazolam 0.5 mg twice a day every day. She received last refill on 7/25/24, #60  and is now out of medication. Daughter asking if medication can be refilled today, before long holiday weekend.     Routing to pod mate(s) due to High Priority status and Dr. Weber and Dr. Jiang is out of office.

## 2024-08-30 NOTE — TELEPHONE ENCOUNTER
Daughter in law Catherine called (ok per HIPAA).  Patient takes Alprazolam 0.5 mg twice a day every day. She received last refill on 7/25/24, #60  and is now out of medication. Daughter asking if medication can be refilled today, before long holiday weekend.

## 2024-09-13 NOTE — TELEPHONE ENCOUNTER
Erica Schwartz requesting Medication Refill for:    Medication name and dose (copy and paste from medication list):   sertraline 100 MG Oral Tab -- --  --   Sig:   Take 1 tablet (100 mg total) by mouth daily.     90 day supply    If medication is not on medication list - transfer patient to RN queue for triage    Preferred Pharmacy: [facility] #[id number] - [place] [address] AT [facility] [address], [phone], [phone]     LOV: 7/30/2024   Last Refill date: unknown  Next Scheduled appointment: No future appointments.    This is the first time Dr. Wu Lazo will be filling this medication.    Patient has 5 pills left.

## 2024-09-17 RX ORDER — SERTRALINE HYDROCHLORIDE 100 MG/1
100 TABLET, FILM COATED ORAL DAILY
Qty: 90 TABLET | Refills: 0 | Status: CANCELLED | OUTPATIENT
Start: 2024-09-17

## 2024-09-17 NOTE — TELEPHONE ENCOUNTER
Refill passed per [facility] protocol.-please advise if refill is appropriate.     Last written on 06/04/2024-by Dr. Jagdish Choi- cardiology     Dr. Lazo last wrote for on 05/30/2023- patient states this would be the first time your writing for it.     Office visit note dated 10/20/2023:  States to continue Sertraline      Requested Prescriptions   Pending Prescriptions Disp Refills    sertraline 100 MG Oral Tab 90 tablet 0     Sig: Take 1 tablet (100 mg total) by mouth daily.       Psychiatric Non-Scheduled (Anti-Anxiety) Passed - 9/13/2024 11:31 AM        Passed - In person appointment or virtual visit in the past 6 mos or appointment in next 3 mos     Recent Outpatient Visits              1 month ago Irritation of right external auditory canal    [address] Nomi Weber, DO    Office Visit    3 months ago Status post right knee replacement    [address]    Office Visit    6 months ago Impacted cerumen of left ear    [address] Nomi Weber, DO    Office Visit    11 months ago Encounter for annual health examination    [address] Wu Lazo MD    Office Visit    1 year ago     [facility] - [facility] Norris Reyes, CHRISTIANA    Office Visit                      Passed - Depression Screening completed within the past 12 months             Recent Outpatient Visits              1 month ago Irritation of right external auditory canal    [address] Nomi Weber, DO    Office Visit    3 months ago Status post right knee replacement    [address]    Office Visit    6 months ago Impacted cerumen of left ear    [address]  Nomi Weber DO    Office Visit    11 months ago Encounter for annual health examination    [facility], [address] [place] [address], [place]    Office Visit    1 year ago     [facility] - [facility] Norris Reyes, PT    Office Visit

## 2024-09-17 NOTE — TELEPHONE ENCOUNTER
Left message to reschedule missed appointment with Gurinder Fournier in cardiology.      Please confirm that the patient is in fact taking this medication. Per chart review this was stopped perioperatively last year and she felt well off of the SSRI. I do not see any refills since then. Thank you!

## 2024-09-18 NOTE — TELEPHONE ENCOUNTER
Triage call transferred.   Spoke with pt to inform as per PCP. Pt indicated did stop for a short time after operation, however, has been taking daily since and per pt has been taking for last 30 years. Pt thinks may have refilled a prescription from previous PCP- Dr. Choi as per dispense hx. Pt just picked up a 90 day supply on 9/16/24.   Informed will relay to PCP update.   Nothing further required at this time. Pt verbalized understanding and agreed with POC.  BA.

## 2024-09-23 DIAGNOSIS — F32.A ANXIETY AND DEPRESSION: ICD-10-CM

## 2024-09-23 DIAGNOSIS — F41.9 ANXIETY AND DEPRESSION: ICD-10-CM

## 2024-09-27 ENCOUNTER — TELEPHONE (OUTPATIENT)
Dept: INTERNAL MEDICINE CLINIC | Facility: CLINIC | Age: 84
End: 2024-09-27

## 2024-09-27 DIAGNOSIS — F32.A ANXIETY AND DEPRESSION: ICD-10-CM

## 2024-09-27 DIAGNOSIS — F41.9 ANXIETY AND DEPRESSION: ICD-10-CM

## 2024-09-27 RX ORDER — ALPRAZOLAM 0.5 MG
0.5 TABLET ORAL 2 TIMES DAILY PRN
Qty: 60 TABLET | Refills: 0 | OUTPATIENT
Start: 2024-09-27

## 2024-09-27 RX ORDER — ALPRAZOLAM 0.5 MG
0.5 TABLET ORAL 2 TIMES DAILY PRN
Qty: 60 TABLET | Refills: 0 | Status: SHIPPED | OUTPATIENT
Start: 2024-09-27

## 2024-09-27 NOTE — TELEPHONE ENCOUNTER
Pt's daughter-in-law calling upset that pt's Alprazolam rx has not been submitted.  Multiple requests in system, routing to MD Violet and MD on call whom pt has seen in the past.  Please submit pended refill request if OK.

## 2024-09-27 NOTE — TELEPHONE ENCOUNTER
Erica Schwartz requesting Medication Refill for:    Medication name and dose (copy and paste from medication list):  Medication Quantity Refills Start End   ALPRAZolam 0.5 MG Oral Tab 60 tablet 0 8/30/2024 --   Sig:   Take 1 tablet (0.5 mg total) by mouth 2 (two) times daily as needed for Anxiety or Sleep.     Route:   Oral     PRN Reason(s):   Anxiety, Sleep     Order #:   [id number]         If medication is not on medication list - transfer patient to RN queue for triage    Preferred Pharmacy:   [facility][id number] - [place], [place] - [address] AT [facility] [address] [facility], [phone], [phone] [address] [place] [address]   Phone: [phone] Fax: [phone]   Hours: Open 24 hours       LOV: 7/30/2024   Last Refill date: 8/30/24  Next Scheduled appointment:

## 2024-09-27 NOTE — TELEPHONE ENCOUNTER
Please Review. Protocol Failed; No Protocol   08/30/2024 07/30/2024 07/02/2024  Last written 08/30/2024  Recent Visits  Date Type Provider Dept   07/30/24 Office Visit Nomi Weber DO [facility] [address]       Requested Prescriptions   Pending Prescriptions Disp Refills    ALPRAZolam 0.5 MG Oral Tab 60 tablet 0     Sig: Take 1 tablet (0.5 mg total) by mouth 2 (two) times daily as needed for Anxiety or Sleep.       Controlled Substance Medication Failed - 9/23/2024  9:17 AM        Failed - This medication is a controlled substance - forward to provider to refill                 Recent Outpatient Visits              1 month ago Irritation of right external auditory canal    [facility], [address] [place] [address], Nomi Mckeon DO    Office Visit    3 months ago Status post right knee replacement    [facility], [address] [place] [address], Tom Giles MD    Office Visit    7 months ago Impacted cerumen of left ear    [facility] [address] [place], [place] [address]Saroj Maryl Louisa, DO    Office Visit    11 months ago Encounter for annual health examination    [facility], [address] [place] [address], Wu Aguilar MD    Office Visit    1 year ago     [facility] - [facility] Norris Reyes, CHRSITIANA    Office Visit

## 2024-10-04 ENCOUNTER — TELEPHONE (OUTPATIENT)
Dept: INTERNAL MEDICINE CLINIC | Facility: CLINIC | Age: 84
End: 2024-10-04

## 2024-10-04 DIAGNOSIS — Z13.29 SCREENING FOR THYROID DISORDER: ICD-10-CM

## 2024-10-04 DIAGNOSIS — I10 PRIMARY HYPERTENSION: Primary | ICD-10-CM

## 2024-10-04 NOTE — TELEPHONE ENCOUNTER
Patient called request labs prior to their annual physical.  Annual physical scheduled for 10/21/24.   Please order labs. Patient preferred lab is [facility].  Patient informed request was sent to clinical team.  Patient informed to fast for labs.  No callback required.

## 2024-10-05 NOTE — TELEPHONE ENCOUNTER
Future Appointments   Date Time Provider Department Center   10/16/2024  2:30 PM PF US [facility]3 [facility] US [place]   10/21/2024  2:40 PM Nomi Weber,  [facility] [address] [facility]     Labs placed per protocol.

## 2024-10-17 ENCOUNTER — LAB ENCOUNTER (OUTPATIENT)
Dept: LAB | Age: 84
End: 2024-10-17
Attending: INTERNAL MEDICINE
Payer: MEDICARE

## 2024-10-17 DIAGNOSIS — E04.1 THYROID NODULE: ICD-10-CM

## 2024-10-17 DIAGNOSIS — Z00.00 ENCOUNTER FOR ANNUAL HEALTH EXAMINATION: ICD-10-CM

## 2024-10-17 DIAGNOSIS — Z13.29 SCREENING FOR THYROID DISORDER: ICD-10-CM

## 2024-10-17 DIAGNOSIS — E78.00 PURE HYPERCHOLESTEROLEMIA: ICD-10-CM

## 2024-10-17 DIAGNOSIS — R73.03 PREDIABETES: ICD-10-CM

## 2024-10-17 DIAGNOSIS — I10 PRIMARY HYPERTENSION: ICD-10-CM

## 2024-10-17 LAB
ALBUMIN SERPL-MCNC: 4.4 G/DL (ref 3.2–4.8)
ALBUMIN/GLOB SERPL: 1.6 {RATIO} (ref 1–2)
ALP LIVER SERPL-CCNC: 66 U/L
ALT SERPL-CCNC: 13 U/L
ANION GAP SERPL CALC-SCNC: 4 MMOL/L (ref 0–18)
AST SERPL-CCNC: 23 U/L (ref ?–34)
BASOPHILS # BLD AUTO: 0.03 X10(3) UL (ref 0–0.2)
BASOPHILS NFR BLD AUTO: 0.5 %
BILIRUB SERPL-MCNC: 0.5 MG/DL (ref 0.2–1.1)
BUN BLD-MCNC: 13 MG/DL (ref 9–23)
CALCIUM BLD-MCNC: 9.5 MG/DL (ref 8.7–10.4)
CHLORIDE SERPL-SCNC: 104 MMOL/L (ref 98–112)
CHOLEST SERPL-MCNC: 165 MG/DL (ref ?–200)
CO2 SERPL-SCNC: 32 MMOL/L (ref 21–32)
CREAT BLD-MCNC: 0.74 MG/DL
EGFRCR SERPLBLD CKD-EPI 2021: 80 ML/MIN/1.73M2 (ref 60–?)
EOSINOPHIL # BLD AUTO: 0.13 X10(3) UL (ref 0–0.7)
EOSINOPHIL NFR BLD AUTO: 2.1 %
ERYTHROCYTE [DISTWIDTH] IN BLOOD BY AUTOMATED COUNT: 14 %
EST. AVERAGE GLUCOSE BLD GHB EST-MCNC: 131 MG/DL (ref 68–126)
FASTING PATIENT LIPID ANSWER: YES
FASTING STATUS PATIENT QL REPORTED: YES
GLOBULIN PLAS-MCNC: 2.8 G/DL (ref 2–3.5)
GLUCOSE BLD-MCNC: 100 MG/DL (ref 70–99)
HBA1C MFR BLD: 6.2 % (ref ?–5.7)
HCT VFR BLD AUTO: 45 %
HDLC SERPL-MCNC: 61 MG/DL (ref 40–59)
HGB BLD-MCNC: 14 G/DL
IMM GRANULOCYTES # BLD AUTO: 0.02 X10(3) UL (ref 0–1)
IMM GRANULOCYTES NFR BLD: 0.3 %
LDLC SERPL CALC-MCNC: 82 MG/DL (ref ?–100)
LYMPHOCYTES # BLD AUTO: 1.21 X10(3) UL (ref 1–4)
LYMPHOCYTES NFR BLD AUTO: 19.6 %
MCH RBC QN AUTO: 28.6 PG (ref 26–34)
MCHC RBC AUTO-ENTMCNC: 31.1 G/DL (ref 31–37)
MCV RBC AUTO: 91.8 FL
MONOCYTES # BLD AUTO: 0.51 X10(3) UL (ref 0.1–1)
MONOCYTES NFR BLD AUTO: 8.3 %
NEUTROPHILS # BLD AUTO: 4.28 X10 (3) UL (ref 1.5–7.7)
NEUTROPHILS # BLD AUTO: 4.28 X10(3) UL (ref 1.5–7.7)
NEUTROPHILS NFR BLD AUTO: 69.2 %
NONHDLC SERPL-MCNC: 104 MG/DL (ref ?–130)
OSMOLALITY SERPL CALC.SUM OF ELEC: 290 MOSM/KG (ref 275–295)
PLATELET # BLD AUTO: 197 10(3)UL (ref 150–450)
POTASSIUM SERPL-SCNC: 4.4 MMOL/L (ref 3.5–5.1)
PROT SERPL-MCNC: 7.2 G/DL (ref 5.7–8.2)
RBC # BLD AUTO: 4.9 X10(6)UL
SODIUM SERPL-SCNC: 140 MMOL/L (ref 136–145)
TRIGL SERPL-MCNC: 124 MG/DL (ref 30–149)
TSI SER-ACNC: 2.21 MIU/ML (ref 0.55–4.78)
VLDLC SERPL CALC-MCNC: 20 MG/DL (ref 0–30)
WBC # BLD AUTO: 6.2 X10(3) UL (ref 4–11)

## 2024-10-17 PROCEDURE — 83036 HEMOGLOBIN GLYCOSYLATED A1C: CPT

## 2024-10-17 PROCEDURE — 36415 COLL VENOUS BLD VENIPUNCTURE: CPT

## 2024-10-17 PROCEDURE — 80053 COMPREHEN METABOLIC PANEL: CPT

## 2024-10-17 PROCEDURE — 85025 COMPLETE CBC W/AUTO DIFF WBC: CPT

## 2024-10-17 PROCEDURE — 84443 ASSAY THYROID STIM HORMONE: CPT

## 2024-10-17 PROCEDURE — 80061 LIPID PANEL: CPT

## 2024-10-21 ENCOUNTER — OFFICE VISIT (OUTPATIENT)
Dept: INTERNAL MEDICINE CLINIC | Facility: CLINIC | Age: 84
End: 2024-10-21
Payer: MEDICARE

## 2024-10-21 ENCOUNTER — TELEPHONE (OUTPATIENT)
Dept: INTERNAL MEDICINE CLINIC | Facility: CLINIC | Age: 84
End: 2024-10-21

## 2024-10-21 VITALS
SYSTOLIC BLOOD PRESSURE: 148 MMHG | HEIGHT: 62 IN | HEART RATE: 64 BPM | RESPIRATION RATE: 18 BRPM | BODY MASS INDEX: 30.77 KG/M2 | OXYGEN SATURATION: 96 % | WEIGHT: 167.19 LBS | DIASTOLIC BLOOD PRESSURE: 68 MMHG

## 2024-10-21 DIAGNOSIS — R73.03 PREDIABETES: ICD-10-CM

## 2024-10-21 DIAGNOSIS — E04.1 THYROID NODULE: ICD-10-CM

## 2024-10-21 DIAGNOSIS — K57.90 DIVERTICULOSIS: ICD-10-CM

## 2024-10-21 DIAGNOSIS — E78.2 MIXED HYPERLIPIDEMIA: ICD-10-CM

## 2024-10-21 DIAGNOSIS — F41.9 ANXIETY AND DEPRESSION: ICD-10-CM

## 2024-10-21 DIAGNOSIS — I10 PRIMARY HYPERTENSION: ICD-10-CM

## 2024-10-21 DIAGNOSIS — Z87.11 HISTORY OF GASTRIC ULCER: ICD-10-CM

## 2024-10-21 DIAGNOSIS — M17.11 PRIMARY OSTEOARTHRITIS OF RIGHT KNEE: ICD-10-CM

## 2024-10-21 DIAGNOSIS — Z00.00 WELLNESS EXAMINATION: Primary | ICD-10-CM

## 2024-10-21 DIAGNOSIS — I35.0 NONRHEUMATIC AORTIC VALVE STENOSIS: ICD-10-CM

## 2024-10-21 DIAGNOSIS — F32.A ANXIETY AND DEPRESSION: ICD-10-CM

## 2024-10-21 DIAGNOSIS — G56.01 CARPAL TUNNEL SYNDROME OF RIGHT WRIST: ICD-10-CM

## 2024-10-21 DIAGNOSIS — Z96.651 HISTORY OF TOTAL RIGHT KNEE REPLACEMENT: ICD-10-CM

## 2024-10-21 DIAGNOSIS — Z87.19 HISTORY OF DUODENAL ULCER: ICD-10-CM

## 2024-10-21 PROBLEM — R60.0 BILATERAL LEG EDEMA: Status: RESOLVED | Noted: 2022-07-01 | Resolved: 2024-10-21

## 2024-10-21 PROBLEM — Z87.898 HISTORY OF PALPITATIONS: Status: RESOLVED | Noted: 2022-05-18 | Resolved: 2024-10-21

## 2024-10-21 PROBLEM — K29.80 DUODENITIS: Status: RESOLVED | Noted: 2023-10-20 | Resolved: 2024-10-21

## 2024-10-21 PROBLEM — M79.89 LEG SWELLING: Status: RESOLVED | Noted: 2022-05-18 | Resolved: 2024-10-21

## 2024-10-21 PROBLEM — R20.0 RIGHT ARM NUMBNESS: Status: RESOLVED | Noted: 2022-07-01 | Resolved: 2024-10-21

## 2024-10-21 PROBLEM — E78.00 PURE HYPERCHOLESTEROLEMIA: Status: RESOLVED | Noted: 2023-10-20 | Resolved: 2024-10-21

## 2024-10-21 PROBLEM — M25.561 CHRONIC PAIN OF RIGHT KNEE: Status: RESOLVED | Noted: 2022-08-04 | Resolved: 2024-10-21

## 2024-10-21 PROBLEM — R42 DIZZINESS: Status: RESOLVED | Noted: 2022-07-01 | Resolved: 2024-10-21

## 2024-10-21 PROBLEM — G89.29 CHRONIC PAIN OF RIGHT KNEE: Status: RESOLVED | Noted: 2022-08-04 | Resolved: 2024-10-21

## 2024-10-21 PROBLEM — R00.2 PALPITATIONS: Status: RESOLVED | Noted: 2022-07-01 | Resolved: 2024-10-21

## 2024-10-21 PROBLEM — E78.5 DYSLIPIDEMIA: Status: RESOLVED | Noted: 2022-05-18 | Resolved: 2024-10-21

## 2024-10-21 PROBLEM — Z78.0 POSTMENOPAUSAL: Status: RESOLVED | Noted: 2022-05-18 | Resolved: 2024-10-21

## 2024-10-21 PROBLEM — K92.2 LOWER GI BLEED: Status: RESOLVED | Noted: 2023-05-29 | Resolved: 2024-10-21

## 2024-10-21 RX ORDER — ALPRAZOLAM 0.5 MG
0.5 TABLET ORAL 2 TIMES DAILY PRN
Qty: 120 TABLET | Refills: 0 | Status: SHIPPED | OUTPATIENT
Start: 2024-10-21

## 2024-10-21 NOTE — PROGRESS NOTES
Subjective:   Erica Schwartz is a 84 year old female who presents for a Subsequent Annual Wellness visit (Pt already had Initial Annual Wellness) and scheduled follow up of multiple significant but stable problems.     Has had a cold for 2 weeks. Runny nose. Started as a sore throat   That is now resolved.  She has a very mild cough with white sputum.  Denies fevers chills or shortness of breath.  Today is the best she has felt since symptom onset.  She needs refill of alprazolam for anxiety.  She gets very nervous at the end of the month That prescription will run out.  She states that she is a worrier     History/Other:   Fall Risk Assessment:   She has been screened for Falls and is low risk.      Cognitive Assessment:   She had a completely normal cognitive assessment - see flowsheet entries     Functional Ability/Status:   Erica Schwartz has some abnormal functions as listed below:  She has Hearing problems based on screening of functional status.    Depression Screening (PHQ):  PHQ-2 SCORE: 0  , done 10/20/2024     Advanced Directives:   She does have a Living Will but we do NOT have it on file in Epic.    She does have a POA but we do NOT have it on file in Epic.      Patient Active Problem List   Diagnosis    Primary hypertension    Anxiety and depression    Tinnitus of both ears    Conductive hearing loss, bilateral    Cardiac murmur    Thyroid nodule    Carpal tunnel syndrome of right wrist    Prediabetes    Primary osteoarthritis of right knee    History of total right knee replacement    History of gastric ulcer    History of duodenal ulcer    Mixed hyperlipidemia    Nonrheumatic aortic valve stenosis    Diverticulosis     Allergies:  She is allergic to aspirin.    Current Medications:  Outpatient Medications Marked as Taking for the 10/21/24 encounter (Office Visit) with Nomi Weber, DO   Medication Sig    ALPRAZolam 0.5 MG Oral Tab Take 1 tablet (0.5 mg total) by mouth 2 (two) times  daily as needed for Anxiety or Sleep.    Neomycin-Polymyxin-HC 1 % Otic Solution Place 4 drops in ear(s) in the morning, at noon, and at bedtime.    furosemide 40 MG Oral Tab Take 1 tablet (40 mg total) by mouth 2 (two) times daily.    sertraline 100 MG Oral Tab Take 1 tablet (100 mg total) by mouth daily.    losartan 25 MG Oral Tab Take 1 tablet (25 mg total) by mouth daily.    B Complex-C-Folic Acid Oral Tab Take by mouth.    Zinc 50 MG Oral Tab Take by mouth.    simvastatin 20 MG Oral Tab Take 1 tablet (20 mg total) by mouth nightly.    cyanocobalamin 1000 MCG Oral Tab Take 1 tablet (1,000 mcg total) by mouth daily.    Vitamin C 500 MG Oral Tab Take 2 tablets (1,000 mg total) by mouth daily.    cholecalciferol 50 MCG (2000 UT) Oral Cap Take 1 capsule (2,000 Units total) by mouth daily.    amLODIPine Besy-Benazepril HCl 5-10 MG Oral Cap Take 1 capsule by mouth daily.    nadolol 40 MG Oral Tab Take 1 tablet (40 mg total) by mouth daily.     Medical History:  She  has a past medical history of Anemia, Anxiety, Bilateral leg edema (07/01/2022), COVID (09/22/2023), Disorder of thyroid, Diverticulosis, Hearing impairment, Heartburn, High blood pressure, High cholesterol, History of blood transfusion (05/2023), History of cardiac murmur, History of palpitations (05/18/2022), History of stomach ulcers, HTN (hypertension), Hyperlipidemia, Lower GI bleed (05/29/2023), Osteoarthritis, Palpitations (07/01/2022), Prediabetes, Tinnitus, Visual impairment, and Wears glasses.  Surgical History:  She  has a past surgical history that includes knee replacement surgery; colonoscopy (N/A, 05/30/2023); total knee replacement (Right, 05/23/2023); upper gi endoscopy,exam; hysterectomy; carpal tunnel release (Right); cholecystectomy; and other surgical history.   Family History:  Her family history is not on file.  Social History:  She  reports that she has never smoked. She has never used smokeless tobacco. She reports current alcohol  use. She reports that she does not use drugs.    Tobacco:  She has never smoked tobacco.    CAGE Alcohol Screen:   CAGE screening score of 0 on 10/20/2024, showing low risk of alcohol abuse.      Patient Care Team:  Nomi Weber DO as PCP - General (Internal Medicine)  Elsy Espinosa APRN (Nurse Practitioner Acute Care)  Adi Gallagher MD (CARDIOLOGY)  Norris Reyes, PT as Physical Therapist  Jefferson Go DO (GASTROENTEROLOGY)    Review of Systems  No f/c/chest pain or sob. No cough. No abd pain/n/v/d. No ha or dizziness. No numbness, tingling, or weakness.     Objective:   Physical Exam  Constitutional: Oriented to person, place, and time. No distress.   HEENT:  Normocephalic and atraumatic.Tympanic membranes appear normal. Oropharynx is clear and moist.   Eyes: Conjunctivae not injected.  Extraocular movements are intact  Neck: Full ROM.  Neck supple.  No thyromegaly  Cardiovascular: Normal rate, regular rhythm and intact distal pulses.  Flow  systolic murmur of aortic stenosis  Pulmonary/Chest: Effort normal and breath sounds normal. No respiratory distress.  Abdominal: Soft. Bowel sounds are normal. Non tender, no masses, no organomegaly or hernias.  Musculoskeletal: No edema in bilateral lower extremities.  Strength is 5/5 in bilateral upper and lower extremities.  Lymphadenopathy: No cervical adenopathy.   Neurological: No focal neurologic deficits.  Cranial nerves II through XII are intact.    Skin: Skin is warm and dry. No rash on visualized skin.  Psychiatric: Normal mood and affect.       /74   Pulse 64   Resp 18   Ht 5' 2\" (1.575 m)   Wt 167 lb 3.2 oz (75.8 kg)   SpO2 96%   BMI 30.58 kg/m²  Estimated body mass index is 30.58 kg/m² as calculated from the following:    Height as of this encounter: 5' 2\" (1.575 m).    Weight as of this encounter: 167 lb 3.2 oz (75.8 kg).    Medicare Hearing Assessment:   Hearing Screening    Screening Method: Whisper Test  Whisper Test Result:  Pass       Assessment & Plan:   Erica Schwartz is a 84 year old female who presents for a Medicare Assessment.     1. Wellness examination (Primary)  2. Anxiety and depression  -     ALPRAZolam; Take 1 tablet (0.5 mg total) by mouth 2 (two) times daily as needed for Anxiety or Sleep.  Dispense: 120 tablet; Refill: 0  3. Nonrheumatic aortic valve stenosis stable, murmur auscultated on exam.  Patient is asymptomatic.  Management per [facility].  4. Primary hypertension patient endorses a history of whitecoat hypertension.  Repeat manual 148/68.  For now continue amlodipine-benazepril 5-10 mg, losartan 25 mg, nadolol 40 mg.  Patient also takes furosemide as needed.  5. Mixed hyperlipidemia most recent lipid panel looks great.  Continue statin therapy.    6. History of gastric ulcer Stable, continues  to monitor  7. History of duodenal ulcer Stable, continues  to monitor   8. Prediabetes most recent hemoglobin A1c 6.2% continue to exercise as able and watch carbohydrates in this form of sweets and pastas.  9. Thyroid nodule  -     US THYROID (CPT=76536); Future; Expected date: 10/21/2024  10. Carpal tunnel syndrome of right wrist status post surgical repair with orthopedics.  Doing well.  11. History of total right knee replacement stable, continue to monitor.  The patient states she plans to follow-up with Dr. Mao.  12. Primary osteoarthritis of right knee  same as 11  13. Diverticulosis stable, continue to monitor.  The patient is having near daily brown stools    The patient indicates understanding of these issues and agrees to the plan.  Reinforced healthy diet, lifestyle, and exercise.      No follow-ups on file.     Nomi Weber DO, 10/21/2024     Supplementary Documentation:   General Health:  In the past six months, have you lost more than 10 pounds without trying?: (Patient-Rptd) 2 - No  Has your appetite been poor?: (Patient-Rptd) No  Type of Diet: (Patient-Rptd) Balanced  How does the patient  maintain a good energy level?: (Patient-Rptd) Appropriate Exercise  How would you describe your daily physical activity?: (Patient-Rptd) Light  How would you describe your current health state?: (Patient-Rptd) Good  How do you maintain positive mental well-being?: (Patient-Rptd) Social Interaction;Puzzles;Visiting Friends;Visiting Family  On a scale of 0 to 10, with 0 being no pain and 10 being severe pain, what is your pain level?: (Patient-Rptd) 0 - (None)  In the past six months, have you experienced urine leakage?: (Patient-Rptd) 0-No  At any time do you feel concerned for the safety/well-being of yourself and/or your children, in your home or elsewhere?: (Patient-Rptd) No  Have you had any immunizations at another office such as Influenza, Hepatitis B, Tetanus, or Pneumococcal?: (Patient-Rptd) No    Health Maintenance   Topic Date Due    Zoster Vaccines (1 of 2) Never done    DEXA Scan  Never done    COVID-19 Vaccine (6 - 2023-24 season) 09/01/2024    Influenza Vaccine (1) 10/01/2024    Annual Physical  10/20/2024    Annual Depression Screening  Completed    Fall Risk Screening (Annual)  Completed    Pneumococcal Vaccine: 65+ Years  Completed

## 2024-10-21 NOTE — TELEPHONE ENCOUNTER
Can the patient please be asked to check her blood pressure at home?  Was elevated in office and she states she has whitecoat hypertension.  Can then update patient reported vital in chart

## 2024-10-23 NOTE — TELEPHONE ENCOUNTER
Pt called office to report:  Her blood pressure readings  10/22 @ 4pm: 142/70, pulse 64  10/23 @ 7am: 179/88, pulse 59  10/23 @ 7:30am: 154/85, pulse 60  Pt denies symptoms of elevated BP, reports taking Lisinopril daily at noon, is unsure of dose as she did not have pill bottle handy at time of call.  I do not see an active rx for Lisinopril, pt previously prescribed Simvastatin but unable to see rx for blood pressure management.     She is still having symptoms of a cold, states at CRISTINA Weber MD discussed sending in a medication if still symptomatic. Reports wet/productive cough, thick white sputum, sinus congestion. Home care interventions discussed, pt wishes for medication. Pharmacy verified.    LOV note 10/21    Has had a cold for 2 weeks. Runny nose. Started as a sore throat   That is now resolved.  She has a very mild cough with white sputum.  Denies fevers chills or shortness of breath.  Today is the best she has felt since symptom onset.         Dr. Weber-   1.  Please advise on pt reported Lisinopril rx and if any changes/recommendations for her asymptomatic BP readings.    2. Please advise if any recommendations for cold other than home care.

## 2024-10-23 NOTE — TELEPHONE ENCOUNTER
She should be taking: Amlodipine/Benazapril  and nadalol. Please have her check pill bottles and verify doses. We will need to make an adjustment/increase

## 2024-10-23 NOTE — TELEPHONE ENCOUNTER
Spoke to Catherine, patients daughter in-law, HIPAA verified, patient not home to verify medications/pill bottles however Catherine will advise patient that we will call tomorrow am.  Catherine verbalizes understanding.

## 2024-10-23 NOTE — TELEPHONE ENCOUNTER
MC message last read by Erica Schwartz at  4:22 PM on 10/22/2024.   Awaiting pt to callback office/reply to MC message.

## 2024-10-25 NOTE — TELEPHONE ENCOUNTER
Patient returned call. She is currently taking Amlodipine/Benazapril 5/10mg once daily and Nadolol 40 mg once daily. She also takes Losartan 25 mg as prescribed by her cardiologist Dr Gallagher. She took her BP this morning - 182/86, after medications was 154/78, pulse 63. She states she \"feels great\".    She is looking for any other recommendations for coughing up green/yellow mucus. Denies any nasal drainage.

## 2024-10-25 NOTE — TELEPHONE ENCOUNTER
Augmentin sent for URI symptoms. Please have patient verify with her cardiologist that she should be taking both Benazepril and Losartan. They are 'cousins' so patients are typically on 1 or the other, not both.

## 2024-10-28 NOTE — TELEPHONE ENCOUNTER
Called and spoke to pt's daughter in law, Catherine (on HIPAA, preferred number listed). Informed her abx was sent for URI, pt may already be aware from pharmacy contacting her, can start if she has not already. Also asked for pt to confirm with cards she is to be on both Benazepril and Losartan, as typically patients are on 1 or the other. Catherine stated she will discuss with pt and contact cards. She will send a MC with an update.

## 2024-11-13 ENCOUNTER — HOSPITAL ENCOUNTER (OUTPATIENT)
Dept: ULTRASOUND IMAGING | Age: 84
Discharge: HOME | End: 2024-11-13
Attending: INTERNAL MEDICINE
Payer: MEDICARE

## 2024-11-13 DIAGNOSIS — E04.1 THYROID NODULE: ICD-10-CM

## 2024-11-13 PROCEDURE — 76536 US EXAM OF HEAD AND NECK: CPT | Performed by: INTERNAL MEDICINE

## 2024-12-05 ENCOUNTER — HOSPITAL ENCOUNTER (OUTPATIENT)
Dept: CV DIAGNOSTICS | Facility: HOSPITAL | Age: 84
Discharge: HOME | End: 2024-12-05
Attending: INTERNAL MEDICINE
Payer: MEDICARE

## 2024-12-05 ENCOUNTER — HOSPITAL ENCOUNTER (OUTPATIENT)
Dept: CT IMAGING | Facility: HOSPITAL | Age: 84
Discharge: HOME | End: 2024-12-05
Attending: INTERNAL MEDICINE
Payer: MEDICARE

## 2024-12-05 ENCOUNTER — HOSPITAL ENCOUNTER (OUTPATIENT)
Dept: ULTRASOUND IMAGING | Facility: HOSPITAL | Age: 84
Discharge: HOME | End: 2024-12-05
Attending: INTERNAL MEDICINE
Payer: MEDICARE

## 2024-12-05 ENCOUNTER — HOSPITAL ENCOUNTER (OUTPATIENT)
Dept: CARDIOLOGY CLINIC | Facility: HOSPITAL | Age: 84
Discharge: HOME | End: 2024-12-05
Attending: INTERNAL MEDICINE
Payer: MEDICARE

## 2024-12-05 ENCOUNTER — HOSPITAL ENCOUNTER (OUTPATIENT)
Dept: LAB | Facility: HOSPITAL | Age: 84
Discharge: HOME | End: 2024-12-05
Attending: INTERNAL MEDICINE
Payer: MEDICARE

## 2024-12-05 VITALS — SYSTOLIC BLOOD PRESSURE: 150 MMHG | HEART RATE: 56 BPM | DIASTOLIC BLOOD PRESSURE: 65 MMHG

## 2024-12-05 DIAGNOSIS — Z87.11 HISTORY OF STOMACH ULCERS: ICD-10-CM

## 2024-12-05 DIAGNOSIS — R42 DIZZINESS: ICD-10-CM

## 2024-12-05 DIAGNOSIS — I35.0 AORTIC STENOSIS: ICD-10-CM

## 2024-12-05 DIAGNOSIS — I10 HTN (HYPERTENSION): ICD-10-CM

## 2024-12-05 DIAGNOSIS — Z87.19 HISTORY OF LOWER GI BLEEDING: ICD-10-CM

## 2024-12-05 DIAGNOSIS — F41.9 ANXIETY: ICD-10-CM

## 2024-12-05 LAB
ANION GAP SERPL CALC-SCNC: 7 MMOL/L (ref 0–18)
BUN BLD-MCNC: 16 MG/DL (ref 9–23)
CALCIUM BLD-MCNC: 9.5 MG/DL (ref 8.7–10.4)
CHLORIDE SERPL-SCNC: 102 MMOL/L (ref 98–112)
CO2 SERPL-SCNC: 33 MMOL/L (ref 21–32)
CREAT BLD-MCNC: 0.83 MG/DL
EGFRCR SERPLBLD CKD-EPI 2021: 69 ML/MIN/1.73M2 (ref 60–?)
GLUCOSE BLD-MCNC: 99 MG/DL (ref 70–99)
OSMOLALITY SERPL CALC.SUM OF ELEC: 295 MOSM/KG (ref 275–295)
POTASSIUM SERPL-SCNC: 4.3 MMOL/L (ref 3.5–5.1)
SODIUM SERPL-SCNC: 142 MMOL/L (ref 136–145)

## 2024-12-05 PROCEDURE — 74174 CTA ABD&PLVS W/CONTRAST: CPT | Performed by: INTERNAL MEDICINE

## 2024-12-05 PROCEDURE — 80048 BASIC METABOLIC PNL TOTAL CA: CPT | Performed by: INTERNAL MEDICINE

## 2024-12-05 PROCEDURE — 93010 ELECTROCARDIOGRAM REPORT: CPT | Performed by: INTERNAL MEDICINE

## 2024-12-05 PROCEDURE — 93005 ELECTROCARDIOGRAM TRACING: CPT

## 2024-12-05 PROCEDURE — 36415 COLL VENOUS BLD VENIPUNCTURE: CPT | Performed by: INTERNAL MEDICINE

## 2024-12-05 PROCEDURE — 93880 EXTRACRANIAL BILAT STUDY: CPT | Performed by: INTERNAL MEDICINE

## 2024-12-05 PROCEDURE — 99212 OFFICE O/P EST SF 10 MIN: CPT

## 2024-12-05 PROCEDURE — 71275 CT ANGIOGRAPHY CHEST: CPT | Performed by: INTERNAL MEDICINE

## 2024-12-05 NOTE — PROGRESS NOTES
[facility]  [address] [address] [address] [facility] [address]  [phone]            2024    TO: Dr. Gallagher    RE: Erica Schwartz   : 1940    I had the opportunity to see your patient, Erica Schwartz in the structural heart center today for evaluation for aortic valve replacement.    As you know, she is an 84-year-old female with known aortic murmur who has been followed clinically and with serial echocardiograms.  She has been experiencing progressive exertional dyspnea and fatigue.  She currently denies: CP, syncope, PND, orthopnea.    The most recent echocardiogram data from 10/30/2024 revealed: SUSANNAH 0.6 cm², mean AV gradient 76 mmHg, peak velocity 5.4M/S, EF 60%, DI 0.22, trace MR, no TR.    There has not been a recent left or right heart catheterization.    The patient's past medical history includes, but is not limited to: Hypertension, hyperlipidemia, history of lower GI bleed post knee replacement-gastric ulcer, thyroid nodule, OA, and anxiety.    The patient's past surgical history includes, but is not limited to: Right TKA , hysterectomy, and cholecystectomy.    The patient's family history is noncontributory.      Patient social history includes: Denies tobacco, EtOH use, or illicit drug use.    The STS risk score is: 4.48%      Allergies:  Allergies[1]    Current Outpatient Medications   Medication Sig Dispense Refill    ALPRAZolam 0.5 MG Oral Tab Take 1 tablet (0.5 mg total) by mouth 2 (two) times daily as needed for Anxiety or Sleep. 120 tablet 0    Neomycin-Polymyxin-HC 1 % Otic Solution Place 4 drops in ear(s) in the morning, at noon, and at bedtime. 1 each 0    furosemide 40 MG Oral Tab Take 1 tablet (40 mg total) by mouth 2 (two) times daily.      sertraline 100 MG Oral Tab Take 1 tablet (100 mg total) by mouth daily.      losartan 25 MG Oral Tab Take 1 tablet (25 mg total) by mouth daily.      B Complex-C-Folic Acid Oral Tab Take by mouth.       Zinc 50 MG Oral Tab Take by mouth.      simvastatin 20 MG Oral Tab Take 1 tablet (20 mg total) by mouth nightly. 90 tablet 0    cyanocobalamin 1000 MCG Oral Tab Take 1 tablet (1,000 mcg total) by mouth daily.      Vitamin C 500 MG Oral Tab Take 2 tablets (1,000 mg total) by mouth daily.      cholecalciferol 50 MCG (2000 UT) Oral Cap Take 1 capsule (2,000 Units total) by mouth daily.      amLODIPine Besy-Benazepril HCl 5-10 MG Oral Cap Take 1 capsule by mouth daily. 90 capsule 0    nadolol 40 MG Oral Tab Take 1 tablet (40 mg total) by mouth daily.       Review of Systems:   Constitutional: No fevers, chills, weight gain or loss.+ Fatigue  ENT: No vision changes, mouth pain, neck swelling  Skin: No masses, rashes or skin changes  Respiratory: Denies cough, or wheezing. + SOB/GARSIA  CV: Denies chest pain, palpitations, orthopnea, PND or dizziness.  Musculoskeletal: No joint pain, stiffness or swelling.  GI: No nausea, vomiting or diarrhea. No blood in stools.  Neurologic: No seizures, tremors, weakness or numbness.      On physical examination, the patient is a well developed, well nourished female, oriented times three in no apparent distress.    Eyes:  sclerae which are anicteric.  PERRL  ENT:  Trachea is in the midline.  There is no neck mass.    Lungs: clear bilaterally without rales, rhonchi or wheezes.    Cardiac: RRR, S1, S2, 5/6 CRISTINE  Abdomen is without organomegaly.  No aortic enlargement is detected.    Extremities reveal pulses which are equal bilaterally.  No venous stasis changes, arterial ulcerations, or edema.    Skin reveals no pigmented lesions or rashes.    Neurologic examination is normal without focal deficits.    Musculoskeletal demonstrates normal range of motion and strength.      Impression:  84 year old female with severe, symptomatic aortic stenosis.  We discussed the nature of aortic stenosis as well as the treatment options, including: Medical management, TAVR, and SAVR.      Utilizing  patient data, he is found to have an STS risk score of 4.48% for mortality with surgical AVR.  I had a long discussion with the patient, and her daughter regarding indications for valve replacement.  We discussed SAVR and TAVR.  In regards to SAVR, we discussed the operative approach, debridement of valve, hospital stay 3-5 days, total recovery time 6-8 weeks and complications of surgery including but not limited to: bleeding, infection, arrhythmia, organ dysfunction and death.  We discussed bioprosthetic and mechanical valves, anticoagulation with each and longevity rates, need for possible reintervention in the future.  In regards to TAVR, we discussed approach, hospital stay 1-2 days, recovery time 1-2 weeks and complications of TAVR including but not limited to: Infection, femoral artery injury needing repair, aortic complication requiring sternotomy, heart block that my or may not require pacemaker.  We discussed the uncertain durability of TAVR valves.  We discussed longevity rates of valve and anticoagulation.      At age 84, with STS risk score of 4.48%, and the above mentioned co morbidities, I feel a transcatheter approach would be the best option, if technically feasible.  Of note, patient is amenable to emergent surgical bailout if clinically indicated.  After our discussusion, I feel they have a good understanding, and wish to proceed with TAVR.  She will complete necessary testing today, including Gated CTA chest/abd/pelvis, and carotid US -with results to be reviewed by the multidisciplinary structural heart committee, with final plans/timing to be determined.      Thank you for letting me see your patient in consultation.     Ayla Holt, APRN  12/5/2024  1225          Surgeon Addendum  Patient personally interviewed and examined by myself.  I agree with the above H&P performed by Ayla KOVACS as amended.  Patient has severe, symptomatic aortic stenosis.      Patient is a moderate a  candidate for SAVR. If patient to proceed with aortic valve intervention, patient is a TAVR candidate.  It patient is to proceed with an aortic valve intervention, the patient is a moderate risk candidate for SAVR and a candidate for TAVR.  Patient prefers TAVR, which I feel is reasonable.    Of note, the patient is ADAMANT he will not take aspirin post-operatively after the GI bleed issue he had that \"almost killed him\" after his orthopedic procedure.    Patient is a candidate for bail out and has agreed to conversion to open heart surgery if indicated.    Luis Fowler MD  [facility]           [1]   Allergies  Allergen Reactions    Aspirin BLEEDING and OTHER (SEE COMMENTS)

## 2024-12-05 NOTE — CONSULTS
[facility]    PATIENT'S NAME: CHET STEVENS   ATTENDING PHYSICIAN: Lazarus Polanco M.D.   CONSULTING PHYSICIAN: Lazarus Polanco M.D.   PATIENT ACCOUNT#:   [id number]    LOCATION:  [facility]  MEDICAL RECORD #:   [id number]       YOB: 1940  ADMISSION DATE:       12/05/2024      CONSULT DATE:  12/05/2024    REPORT OF CONSULTATION    HISTORY OF PRESENT ILLNESS:  This is the first office visit for the patient with me.  I am seeing her today in the Structural Heart Clinic for consideration of percutaneous transcatheter aortic valve replacement.    The patient, to the best of her knowledge, has had a lifelong murmur, yet no adverse effects from a cardiovascular standpoint.  She is originally from [place] and her family moved her out here 3 years ago, and she is now living in the [facility].  She is very active.    She has had no overt heart failure but has had a falloff in her overall exertional tolerance with exertional dyspnea.  She still performs all her activities of daily living, but some of her physical activity has slowed slightly due to shortness of breath with exertion.    Her most recent echocardiogram has demonstrated significant progression of her aortic stenosis.  She has preserved LV systolic function.  Peak velocity across the aortic valve was 5.4 m/sec.  Mean gradient 76 mmHg.  Dimensionless index 0.22.    Her electrocardiogram reveals normal sinus rhythm.  No ischemic change.    She had a bleeding ulcer on aspirin therapy after a total knee replacement.  No recent symptoms in this regard.    PHYSICAL EXAMINATION:  On examination, she is very pleasant and cognitively intact.  Lungs are clear.  She is in sinus rhythm with an obvious aortic stenotic murmur.    LABORATORY DATA:  BUN 13, creatinine 0.74.  I have not seen a recent hemoglobin.    Her STS is 4.48%.    We had a nice discussion today along with her daughter-in-law, Catherine, regarding her aortic stenosis and the  options for treatment including medical management, surgical valve replacement, or percutaneous valve replacement.  Obviously, with severe underlying stenosis and evolving clinical symptomatology at age 84, a percutaneous approach to her valve would be preferable if technically feasible.    We talked about the risks, benefits, and alternatives of all these approaches.  I think she has a very nice understanding.  She will move forward with her testing today, and then we will discuss her with the structural heart team at next week's case review.    Dictated By Lazarus Polanco M.D.  d: 12/05/2024 09:21:41  t: 12/05/2024 13:10:15  [facility] [id number]  WS/

## 2024-12-05 NOTE — IMAGING NOTE
Erica Schwartz to CT Rm 4.     O2 applied via nasal cannula @ 2LPM.  Procedure explained and questions answered.   GFR = 69  Contrast injected at 1052  Contrast injection = 85ml  0.9 NS flush = 100ml  Average HR = 47bpm    Patient tolerated the procedure without complication. Denies any contrast reaction. Discontinued IV saline lock.   Escorted pt back to radiology holding to await pickup from TAVR clinic GORDON Zamarripa. Discharged from CT in stable condition.

## 2024-12-05 NOTE — PROGRESS NOTES
TAVR procedure and process explained to pt and dtr n law.  All questions answered. Educational folder provided.  Will do KCCQ and 5M walk at PAT visit.

## 2024-12-07 LAB
ATRIAL RATE: 63 BPM
P AXIS: 1 DEGREES
P-R INTERVAL: 196 MS
Q-T INTERVAL: 440 MS
QRS DURATION: 82 MS
QTC CALCULATION (BEZET): 450 MS
R AXIS: -15 DEGREES
T AXIS: 89 DEGREES
VENTRICULAR RATE: 63 BPM

## 2024-12-17 RX ORDER — SERTRALINE HYDROCHLORIDE 100 MG/1
100 TABLET, FILM COATED ORAL DAILY
Qty: 90 TABLET | Refills: 3 | Status: SHIPPED | OUTPATIENT
Start: 2024-12-17

## 2024-12-21 DIAGNOSIS — F32.A ANXIETY AND DEPRESSION: ICD-10-CM

## 2024-12-21 DIAGNOSIS — F41.9 ANXIETY AND DEPRESSION: ICD-10-CM

## 2024-12-27 RX ORDER — ALPRAZOLAM 0.5 MG
0.5 TABLET ORAL 2 TIMES DAILY PRN
Qty: 120 TABLET | Refills: 0 | Status: SHIPPED | OUTPATIENT
Start: 2024-12-27

## 2024-12-27 NOTE — TELEPHONE ENCOUNTER
Please review; protocol failed/ has no protocol      Recent fills: 10/26/2024,09/27/2024,08/30/2024  Last Rx written: 10/21/2024  Last Office Visit: 10/21/2024    Recent Visits  Date Type Provider Dept   10/21/24 Office Visit Nomi Weber DO [facility] [address]         Requested Prescriptions   Pending Prescriptions Disp Refills    ALPRAZolam 0.5 MG Oral Tab 120 tablet 0     Sig: Take 1 tablet (0.5 mg total) by mouth 2 (two) times daily as needed for Anxiety or Sleep.       Controlled Substance Medication Failed - 12/27/2024  8:07 AM        Failed - This medication is a controlled substance - forward to provider to refill           Recent Outpatient Visits              2 months ago Wellness examination    [facility], [address] [place], [place] [address]Saroj Maryl Louisa,     Office Visit    5 months ago Irritation of right external auditory canal    [facility] [address] [place], [place] [address]Saroj Maryl Louisa,     Office Visit    6 months ago Status post right knee replacement    [facility], [address] [place], [place] [address]Saroj Ryan, MD    Office Visit    10 months ago Impacted cerumen of left ear    [facility] [address] [place], [place] [address]Saroj Maryl Louisa,     Office Visit    1 year ago Encounter for annual health examination    [facility] [address] [place], [place] [address]Saroj Amish, MD    Office Visit          Future Appointments         Provider Department Appt Notes    In 3 days [facility] [address] CT - [place] [facility]    In 2 weeks  CARD PHLEBOTOMY [address] [facility] qa/pjd   TAVR PAT    In 2 weeks Apn, Structural Heart [facility] qa/pjd  TAVR PAT    In 2 weeks EH XR ER PORTABLE [facility] X-ray

## 2024-12-30 ENCOUNTER — HOSPITAL ENCOUNTER (OUTPATIENT)
Dept: CT IMAGING | Age: 84
Discharge: HOME | End: 2024-12-30
Attending: INTERNAL MEDICINE
Payer: MEDICARE

## 2024-12-30 DIAGNOSIS — R91.8 PULMONARY NODULES: ICD-10-CM

## 2024-12-30 PROCEDURE — 71250 CT THORAX DX C-: CPT | Performed by: INTERNAL MEDICINE

## 2025-01-07 ENCOUNTER — OFFICE VISIT (OUTPATIENT)
Age: 85
End: 2025-01-07
Payer: MEDICARE

## 2025-01-07 VITALS
RESPIRATION RATE: 16 BRPM | SYSTOLIC BLOOD PRESSURE: 102 MMHG | BODY MASS INDEX: 30.55 KG/M2 | HEIGHT: 62 IN | HEART RATE: 65 BPM | OXYGEN SATURATION: 99 % | DIASTOLIC BLOOD PRESSURE: 52 MMHG | WEIGHT: 166 LBS

## 2025-01-07 DIAGNOSIS — R06.09 DOE (DYSPNEA ON EXERTION): ICD-10-CM

## 2025-01-07 DIAGNOSIS — R91.8 MULTIPLE PULMONARY NODULES: Primary | ICD-10-CM

## 2025-01-07 PROCEDURE — 99204 OFFICE O/P NEW MOD 45 MIN: CPT | Performed by: INTERNAL MEDICINE

## 2025-01-07 RX ORDER — LOSARTAN POTASSIUM 50 MG/1
50 TABLET ORAL DAILY
COMMUNITY
Start: 2024-10-30

## 2025-01-07 NOTE — PATIENT INSTRUCTIONS
Obtain a pulmonary function test next week. Call central scheduling at [phone] to schedule the test  Obtain a CT chest scan in approximately six months - Call central scheduling at [phone] to schedule the CT scan   Call/message with questions/concerns

## 2025-01-07 NOTE — H&P
[facility] Interventional Pulmonary Consult Note    History of Present Illness:  Erica Schwartz is a 84 year old female never smoker with significant PMH of aortic stenosis, PUD, HTN who presents today for evaluation of lung nodules. She was found to have nodules on her CT imaging on workup for dyspnea and aortic stenosis. She has slowly progressive GARSIA and noted aortic stenosis and ongoing workup at heart center and upcoming TAVR later this month  She did have a 'cold' around September/October 2024 and has had COVID in the past.  Denies any significant previous respiratory infections. Denies any hx of lung disease, asthma or recurrent bronchitis.   Born/raised in [place], has lived in [place] for the past few years with family.    Past Medical History:   Past Medical History:    Anemia    Anxiety    Bilateral leg edema    COVID    no fever , just tired    Disorder of thyroid    Diverticulosis    Hearing impairment    bilateral hearing aids    Heartburn    High blood pressure    High cholesterol    History of blood transfusion    History of cardiac murmur    History of palpitations    History of stomach ulcers    HTN (hypertension)    Hyperlipidemia    Lower GI bleed    Osteoarthritis    Palpitations    Prediabetes    Tinnitus    Visual impairment    glasses    Wears glasses        Past Surgical History:   Past Surgical History:   Procedure Laterality Date    Carpal tunnel release Right     Cholecystectomy      Colonoscopy N/A 05/30/2023    Procedure: COLONOSCOPY;  Surgeon: Kevin Shane DO;  Location:  ENDOSCOPY    Hysterectomy      Knee replacement surgery      Other surgical history      PLATER WARTS    Total knee replacement Right 05/23/2023    Upper gi endoscopy,exam           Family Medical History: History reviewed. No pertinent family history.     Social History:   Social History     Socioeconomic History    Marital status:      Spouse name: Not on file    Number of children: Not on file    Years of  education: Not on file    Highest education level: Not on file   Occupational History    Not on file   Tobacco Use    Smoking status: Never    Smokeless tobacco: Never   Vaping Use    Vaping status: Never Used   Substance and Sexual Activity    Alcohol use: Yes     Comment: 1 glass of wine nightly    Drug use: Never    Sexual activity: Not on file   Other Topics Concern    Not on file   Social History Narrative    Not on file     Social Drivers of Health     Financial Resource Strain: Not on file   Food Insecurity: Not on file   Transportation Needs: Not on file   Physical Activity: Not on file   Stress: Not on file   Social Connections: Not on file   Housing Stability: Not on file          Allergies: Aspirin     Medications:   Current Outpatient Medications   Medication Sig Dispense Refill    ALPRAZolam 0.5 MG Oral Tab Take 1 tablet (0.5 mg total) by mouth 2 (two) times daily as needed for Anxiety or Sleep. 120 tablet 0    sertraline 100 MG Oral Tab Take 1 tablet (100 mg total) by mouth daily. 90 tablet 3    furosemide 40 MG Oral Tab Take 1 tablet (40 mg total) by mouth daily.      losartan 25 MG Oral Tab Take 1 tablet (25 mg total) by mouth daily.      simvastatin 20 MG Oral Tab Take 1 tablet (20 mg total) by mouth nightly. 90 tablet 0    amLODIPine Besy-Benazepril HCl 5-10 MG Oral Cap Take 1 capsule by mouth daily. 90 capsule 0    nadolol 40 MG Oral Tab Take 1 tablet (40 mg total) by mouth daily.      losartan 50 MG Oral Tab Take 1 tablet (50 mg total) by mouth daily. (Patient not taking: Reported on 1/7/2025)      Neomycin-Polymyxin-HC 1 % Otic Solution Place 4 drops in ear(s) in the morning, at noon, and at bedtime. (Patient not taking: Reported on 1/7/2025) 1 each 0    B Complex-C-Folic Acid Oral Tab Take by mouth. (Patient not taking: Reported on 1/7/2025)      Zinc 50 MG Oral Tab Take by mouth. (Patient not taking: Reported on 1/7/2025)      cyanocobalamin 1000 MCG Oral Tab Take 1 tablet (1,000 mcg total) by  mouth daily. (Patient not taking: Reported on 1/7/2025)      Vitamin C 500 MG Oral Tab Take 2 tablets (1,000 mg total) by mouth daily. (Patient not taking: Reported on 1/7/2025)      cholecalciferol 50 MCG (2000 UT) Oral Cap Take 1 capsule (2,000 Units total) by mouth daily. (Patient not taking: Reported on 1/7/2025)         Review of Systems: Review of Systems   Constitutional: Negative.    HENT: Negative.     Respiratory:  Positive for shortness of breath. Negative for wheezing and stridor.    Cardiovascular: Negative.    All other systems reviewed and are negative.      Physical Exam:  /62 (BP Location: Left arm, Patient Position: Sitting, Cuff Size: adult)   Pulse 65   Resp 16   Ht 5' 2\" (1.575 m)   Wt 166 lb (75.3 kg)   SpO2 99%   BMI 30.36 kg/m²      Constitutional: alert, cooperative. No acute distress.  HEENT: Head NC/AT.    Cardio: Regular rate and rhythm. +CRISTINE  Respiratory: Thorax symmetrical with no labored breathing. Clear to ausculation bilaterally with symmetrical breath sounds. No wheezing, rhonchi, rales, or crackles.   Extremities: No clubbing or cyanosis. No LE edema.    Neurologic: A&Ox3. No gross motor deficits.  Skin: Warm, dry  Psych: Calm, cooperative. Pleasant affect.    Results:  Personally reviewed    WBC: 6.2, done on 10/17/2024.  HGB: 14, done on 10/17/2024.  PLT: 197, done on 10/17/2024.     Glucose: 99, done on 12/5/2024.  Cr: 0.83, done on 12/5/2024.  Last eGFR was 69 on 12/5/2024.  CA: 9.5, done on 12/5/2024.  Na: 142, done on 12/5/2024.  K: 4.3, done on 12/5/2024.  Cl: 102, done on 12/5/2024.  CO2: 33, done on 12/5/2024.  Last ALB was 4.4% done on 10/17/2024.     CT CHEST (CPT=71250)    Result Date: 12/30/2024  CONCLUSION:  1. There are scattered regions of tree-in-bud nodularity which may represent a nonspecific infectious or inflammatory process.  A follow-up chest CT in 3 months after treatment is recommended for further characterization. 2. There are separate  scattered multiple solid nodules within both lungs measuring up to 7 mm in size.  Please see the recommendation below.  The findings include multiple, incidentally detected, solid pulmonary nodules, measuring between 6 and 8mm.  2017 guidelines from the Fleischner Society for the follow-up and management of newly detected indeterminate pulmonary nodules in persons at least 35 years of age depend on nodule size (average length and width) and underlying risk factors (including smoking and other risk factors). Please consider the following recommendations after clinical assessment of risk factors.  For 6-8mm nodules: In  low risk patients, CT in 3 to 6 months, then consider CT in 18 to 24 months.  In high risk patients, CT in 3 to 6 months, then obtain CT in 18 to 24 months.  Use most suspicious nodule as guide to management.   LOCATION:  [place]   Dictated by (CST): Stromberg, LeRoy, MD on 12/30/2024 at 12:32 PM     Finalized by (CST): Stromberg, LeRoy, MD on 12/30/2024 at 12:43 PM       CTA GATED THORACIC AORTA (CPT=71275)    Result Date: 12/12/2024  SUMMARY: 1.  Calcified, Probably trileaflet aortic valve with aortic annulus measuring 2.50 cm x 1.86 cm, area 3.67 cm2, average diameter 2.16 cm and perimeter 69.7 mm. 2.  No significant subannular or LVOT calcification. 3.  Sinuses of Valsalva diameter of 2.7-2.8 cm and sinotubular junction average diameter of 2.8 cm and no significant calcification. 4.  Normal thickness of epicardial fat measuring ~3 mm at the LV apex. 5.  Right dominant coronary artery system with mild scattered calcification within the LAD causing visually 25-50% stenosis but no critical stenoses in the reminder visualized major epicardial coronary arteries. This cardiac scan was interpreted by a cardiologist with respect to the heart only. Please consult the radiology report for interpretation of non-cardiac structures. Giovany Gusman MD 12/12/2024 6:15 PM     CTA ABD/PEL (CPT=74174)    Result Date:  12/5/2024  CONCLUSION:  1. Mild calcified atherosclerosis.  Iliac luminal dimensions appear adequate. 2. Stable splenic mass, supporting benignity. 3. Details as above.    LOCATION:  [facility]   Dictated by (CST): Adi Samuels MD on 12/05/2024 at 4:39 PM     Finalized by (CST): Adi Samuels MD on 12/05/2024 at 4:46 PM       CT CARDIAC OVER READ    Result Date: 12/5/2024  CONCLUSION:  Innumerable micro nodules are present within the lungs.  Further evaluation with dedicated chest CT would be recommended.     LOCATION:  [facility]    Dictated by (CST): Jose Haro MD on 12/05/2024 at 1:36 PM     Finalized by (CST): Jose Haro MD on 12/05/2024 at 1:41 PM       US CAROTID DOPPLER BILAT - DIAG IMG (CPT=93880)    Result Date: 12/5/2024  CONCLUSION:  Mild carotid bifurcation atherosclerosis.  No evidence of hemodynamically significant stenosis.   LOCATION:  [facility]     Dictated by (CST): Adi Samuels MD on 12/05/2024 at 11:35 AM     Finalized by (CST): Adi Samuels MD on 12/05/2024 at 11:36 AM       US THYROID (CPT=76536)    Result Date: 11/13/2024  CONCLUSION:  1. Heterogeneous nodular thyroid gland with bilateral small subcentimeter nodules as detailed above.   ACR TI-RADS recommendations: TR5 - FNA if greater than or equal to 1 cm, follow-up if 0.5-0.9 cm every year for 5 years. TR4 - FNA if greater than or equal to 1.5 cm, follow-up if 1-1.4 cm in 1, 2, 3 and 5 years. TR3 - FNA if greater than or equal to 2.5 cm, follow-up if 1.5-2.4 cm in 1, 3 and 5 years TR1 and TR2 - no FNA or follow-up  Please note ACR TI-RADS recommendations are based on imaging features and size of the nodule only.  In certain clinical circumstances additional or alternative evaluation may be indicated.   LOCATION:  [facility]        Dictated by (CST): Kim Picekns MD on 11/13/2024 at 6:58 PM     Finalized by (CST): Kim Pickens MD on 11/13/2024 at 7:01 PM         Assessment/Plan:  #1. Lung nodules  Noted on workup for dyspnea, aortic stenosis  12/2024 cardiac scan  with bilateral nodules, solid and subsolid nodules noted. Limited view of each lung given cardiac scan  12/2024 CT chest with resolution of some of the ground glass nodules, solid nodules are stable.   I did review her 12/2022 CT abdomen/pelvis scan which shows the RML, lingula and LLL nodules are all stable (two years).  However the 2022 CT did not image either upper lobe so I cannot determine if the upper lobe nodules are new or old.  Advised to repeat CT chest in ~ 6months to reassess the nodules.  If stable, then plan on repeat in one year    #2. GARSIA  Suspect related to aortic stenosis, doubt pulm source  Check PFTs to ensure normal pulmonary function    Assuming her PFTs are normal, she should proceed with the planned TAVR. Will forward note to heart team    Alejandro Cardenas MD  1/7/2025

## 2025-01-14 ENCOUNTER — HOSPITAL ENCOUNTER (OUTPATIENT)
Dept: GENERAL RADIOLOGY | Facility: HOSPITAL | Age: 85
Discharge: HOME | End: 2025-01-14
Attending: INTERNAL MEDICINE
Payer: MEDICARE

## 2025-01-14 ENCOUNTER — APPOINTMENT (OUTPATIENT)
Dept: RESPIRATORY THERAPY | Facility: HOSPITAL | Age: 85
End: 2025-01-14
Attending: INTERNAL MEDICINE
Payer: MEDICARE

## 2025-01-14 ENCOUNTER — HOSPITAL ENCOUNTER (OUTPATIENT)
Dept: CV DIAGNOSTICS | Facility: HOSPITAL | Age: 85
Discharge: HOME | End: 2025-01-14
Attending: INTERNAL MEDICINE
Payer: MEDICARE

## 2025-01-14 ENCOUNTER — HOSPITAL ENCOUNTER (OUTPATIENT)
Dept: LAB | Facility: HOSPITAL | Age: 85
Discharge: HOME | End: 2025-01-14
Attending: NURSE PRACTITIONER
Payer: MEDICARE

## 2025-01-14 ENCOUNTER — HOSPITAL ENCOUNTER (OUTPATIENT)
Dept: CARDIOLOGY CLINIC | Facility: HOSPITAL | Age: 85
Discharge: HOME | End: 2025-01-14
Attending: INTERNAL MEDICINE
Payer: MEDICARE

## 2025-01-14 DIAGNOSIS — I35.0 AORTIC STENOSIS: ICD-10-CM

## 2025-01-14 DIAGNOSIS — I50.9 CHF (CONGESTIVE HEART FAILURE) (HCC): ICD-10-CM

## 2025-01-14 DIAGNOSIS — I10 HTN (HYPERTENSION): ICD-10-CM

## 2025-01-14 DIAGNOSIS — I35.0 SEVERE AORTIC STENOSIS: Primary | ICD-10-CM

## 2025-01-14 DIAGNOSIS — K92.2 GIB (GASTROINTESTINAL BLEEDING): ICD-10-CM

## 2025-01-14 DIAGNOSIS — R06.09 DOE (DYSPNEA ON EXERTION): ICD-10-CM

## 2025-01-14 LAB
ALBUMIN SERPL-MCNC: 4.4 G/DL (ref 3.2–4.8)
ALBUMIN/GLOB SERPL: 1.3 {RATIO} (ref 1–2)
ALP LIVER SERPL-CCNC: 63 U/L
ALT SERPL-CCNC: 16 U/L
ANION GAP SERPL CALC-SCNC: 7 MMOL/L (ref 0–18)
ANTIBODY SCREEN: NEGATIVE
AST SERPL-CCNC: 26 U/L (ref ?–34)
ATRIAL RATE: 49 BPM
BILIRUB SERPL-MCNC: 0.6 MG/DL (ref 0.2–1.1)
BUN BLD-MCNC: 15 MG/DL (ref 9–23)
CALCIUM BLD-MCNC: 9.7 MG/DL (ref 8.7–10.6)
CHLORIDE SERPL-SCNC: 102 MMOL/L (ref 98–112)
CO2 SERPL-SCNC: 32 MMOL/L (ref 21–32)
CREAT BLD-MCNC: 0.82 MG/DL
EGFRCR SERPLBLD CKD-EPI 2021: 70 ML/MIN/1.73M2 (ref 60–?)
ERYTHROCYTE [DISTWIDTH] IN BLOOD BY AUTOMATED COUNT: 14.4 %
GLOBULIN PLAS-MCNC: 3.3 G/DL (ref 2–3.5)
GLUCOSE BLD-MCNC: 107 MG/DL (ref 70–99)
HCT VFR BLD AUTO: 47 %
HGB BLD-MCNC: 15.3 G/DL
MCH RBC QN AUTO: 28.8 PG (ref 26–34)
MCHC RBC AUTO-ENTMCNC: 32.6 G/DL (ref 31–37)
MCV RBC AUTO: 88.5 FL
NT-PROBNP SERPL-MCNC: 3061 PG/ML (ref ?–450)
OSMOLALITY SERPL CALC.SUM OF ELEC: 293 MOSM/KG (ref 275–295)
P AXIS: -15 DEGREES
P-R INTERVAL: 202 MS
PLATELET # BLD AUTO: 232 10(3)UL (ref 150–450)
POTASSIUM SERPL-SCNC: 4.4 MMOL/L (ref 3.5–5.1)
PROT SERPL-MCNC: 7.7 G/DL (ref 5.7–8.2)
Q-T INTERVAL: 490 MS
QRS DURATION: 84 MS
QTC CALCULATION (BEZET): 442 MS
R AXIS: -15 DEGREES
RBC # BLD AUTO: 5.31 X10(6)UL
RH BLOOD TYPE: POSITIVE
SODIUM SERPL-SCNC: 141 MMOL/L (ref 136–145)
T AXIS: 79 DEGREES
VENTRICULAR RATE: 49 BPM
WBC # BLD AUTO: 7.5 X10(3) UL (ref 4–11)

## 2025-01-14 PROCEDURE — 86901 BLOOD TYPING SEROLOGIC RH(D): CPT | Performed by: INTERNAL MEDICINE

## 2025-01-14 PROCEDURE — 94060 EVALUATION OF WHEEZING: CPT

## 2025-01-14 PROCEDURE — 71045 X-RAY EXAM CHEST 1 VIEW: CPT | Performed by: INTERNAL MEDICINE

## 2025-01-14 PROCEDURE — 99213 OFFICE O/P EST LOW 20 MIN: CPT | Performed by: NURSE PRACTITIONER

## 2025-01-14 PROCEDURE — 36415 COLL VENOUS BLD VENIPUNCTURE: CPT | Performed by: INTERNAL MEDICINE

## 2025-01-14 PROCEDURE — 83880 ASSAY OF NATRIURETIC PEPTIDE: CPT | Performed by: INTERNAL MEDICINE

## 2025-01-14 PROCEDURE — 93010 ELECTROCARDIOGRAM REPORT: CPT | Performed by: INTERNAL MEDICINE

## 2025-01-14 PROCEDURE — 94729 DIFFUSING CAPACITY: CPT

## 2025-01-14 PROCEDURE — 93005 ELECTROCARDIOGRAM TRACING: CPT

## 2025-01-14 PROCEDURE — 85027 COMPLETE CBC AUTOMATED: CPT | Performed by: INTERNAL MEDICINE

## 2025-01-14 PROCEDURE — 94726 PLETHYSMOGRAPHY LUNG VOLUMES: CPT

## 2025-01-14 PROCEDURE — 86900 BLOOD TYPING SEROLOGIC ABO: CPT | Performed by: INTERNAL MEDICINE

## 2025-01-14 PROCEDURE — 86850 RBC ANTIBODY SCREEN: CPT | Performed by: INTERNAL MEDICINE

## 2025-01-14 PROCEDURE — 80053 COMPREHEN METABOLIC PANEL: CPT | Performed by: INTERNAL MEDICINE

## 2025-01-14 NOTE — PROGRESS NOTES
1/23/25 TAVR TOA 0830.  NPO after midnight.  Take all meds on Wednesday. Consents, labs, EKG, and cxr performed.

## 2025-01-14 NOTE — PROGRESS NOTES
[facility]  Cardiology H & P    Erica Schwartz Patient Status:  Outpatient    1940 MRN [id number]   Location [facility] Attending Lazarus Polanco MD   Hosp Day # 0 PCP Nomi Weber DO     History of Present Illness: Erica Schwartz is a pleasant 84 year old female with severe, symptomatic aortic stenosis who presents for PAT for upcoming TAVR procedure.  He was evaluated by the Multidisciplinary Structural Heart Team and deemed a suitable TAVR candidate.  She currently denies:  SOB, CP, palpitations, fever, cough, chills, N/V, diarrhea.    Past Surgical/Medical History:  Past Medical History:    Anemia    Anxiety    Bilateral leg edema    COVID    no fever , just tired    Disorder of thyroid    Diverticulosis    Hearing impairment    bilateral hearing aids    Heartburn    High blood pressure    High cholesterol    History of blood transfusion    History of cardiac murmur    History of palpitations    History of stomach ulcers    HTN (hypertension)    Hyperlipidemia    Lower GI bleed    Osteoarthritis    Palpitations    Prediabetes    Tinnitus    Visual impairment    glasses    Wears glasses     Past Surgical History:   Procedure Laterality Date    Carpal tunnel release Right     Cholecystectomy      Colonoscopy N/A 2023    Procedure: COLONOSCOPY;  Surgeon: Kevin Shane DO;  Location:  ENDOSCOPY    Hysterectomy      Knee replacement surgery      Other surgical history      PLATER WARTS    Total knee replacement Right 2023    Upper gi endoscopy,exam         Social History:  Social History     Socioeconomic History    Marital status:    Tobacco Use    Smoking status: Never    Smokeless tobacco: Never   Vaping Use    Vaping status: Never Used   Substance and Sexual Activity    Alcohol use: Yes     Comment: 1 glass of wine nightly    Drug use: Never        Family History:  No family history on file.      Allergies:  Aspirin    Review of Systems:  Unremarkable except pertinent systems as noted in history of present illness    Physical Exam:   There were no vitals taken for this visit.   Wt Readings from Last 1 Encounters:   01/07/25 166 lb (75.3 kg)       General: Alert and oriented in no apparent distress.  HEENT: No focal deficits.  Neck: No JVD, carotids 2+ no bruits.  Cardiac: Regular rate and rhythm, S1, S2 normal,  + CRISTINE  Lungs: Clear without wheezes, rales, rhonchi or dullness.  Normal excursions and effort.  Abdomen: Soft, non-tender, ND, BS +  Extremities: Without clubbing, cyanosis or edema.  Peripheral pulses are 2+.  Neurologic: Alert and oriented, normal affect.  Skin: Warm and dry. No rashes, lesions, or masses noted    Laboratory and Data:  Diagnostics:  Gated CTA: 12/5/24:  1.  Calcified, Probably trileaflet aortic valve with aortic annulus measuring 2.50 cm x 1.86 cm, area 3.67 cm2, average diameter 2.16 cm and perimeter 69.7 mm.  2.  No significant subannular or LVOT calcification.  3.  Sinuses of Valsalva diameter of 2.7-2.8 cm and sinotubular junction average diameter of 2.8 cm and no significant calcification.  4.  Normal thickness of epicardial fat measuring ~3 mm at the LV apex.  5.  Right dominant coronary artery system with mild scattered calcification within the LAD causing visually 25-50% stenosis but no critical stenoses in the reminder visualized major epicardial coronary arteries.    Echo: 10/30/2024  SUSANNAH 0.6 cm², mean AV gradient 76 mmHg, peak velocity 5.4M/S, EF 60%, DI 0.22, trace MR    Carotid US: 12/5/2024  Mild carotid bifurcation atherosclerosis.  No evidence of hemodynamically significant stenosis.     Assessment:  Severe, symptomatic aortic stenosis  Hypertension  Hyperlipidemia  OA  History lower GI bleed post TKA-gastric ulcer  Thyroid nodule  Anxiety    Plan:   - Complete PAT testing today including labs/CXR/EKG   - Risks, benefits, alternatives explained previously to  patient/family by TAVR team, consents obtained by structural heart RN (See consents for full details)   - Complete TVT required KCCQ, 5 meter walk   - Plan for SDA for TAVR as directed   - All questions answered to the best of this practitioners ability, to the patient's satisfaction      Ayla Holt, APRN  1/14/2025  1100

## 2025-01-22 NOTE — DISCHARGE INSTRUCTIONS
Instructions after Transcatheter Aortic Valve Replacement (TAVR):    ACTIVITY:   Do not drive for 3 days after procedure, or as directed by your physician.  Do not lift anything heavier than 5-10bs for 1 week, or as directed by your physician.  Walk at least 3 times per day for 5-10 minutes at a time. Increase your activity gradually.  Keep legs elevated while sitting.   No tight-fitting underwear.     HYGIENE:  Wash incisions with mild soap and water daily. Showering is allowed. No tub baths or swimming pools until totally healed.  Do not apply lotions, ointments or creams to the incisions.   Avoid constipation or straining to have a bowel movement. If necessary, use Milk of Magnesia, Metamucil or other supplements to relieve straining.     NOTIFY YOUR PHYSICIAN IF:  Temperature is greater than 101 degrees, have chills, sweating or fever for more than one day.  Drainage, tenderness, redness, swelling, persistent pain from the incisions, or new numbness in the legs or feet.  Persistent chest discomfort or pain (angina) that radiates to the neck, jaw, or arm.   Nausea or profuse sweating.  Increasing shortness of breath with activity or at rest.  If your pain medication is not relieving your pain.    WOUND HEALING:  If you notice minor bleeding from the puncture wound, please do the following things. If it does not stop with these measures, please notify your doctor immediately:  Immediately lie flat.   Apply firm pressure just above the puncture site and hold firm pressure for 15 minutes. You may use a clean cloth to apply pressure. If possible, have another person apply the pressure.   After 15 minutes remove pressure. The wound should be dry and flat, without bleeding. You should continue to lie flat for about 1 hour before getting up and walking.   Cover the wound with a Band-Aid.     FOLLOW-UP CARE:  You will need a follow-up visit to check your incisions approximately 7 to 10 days after discharge, and will be  seen in your cardiologist office by the APN.  You will be seen in the valve clinic 1 month after discharge, if not given an appointment please call [phone] for [facility] patient's or [phone] for duly patients to make an appointment    IMPORTANT:  Always inform other doctors about your heart valve replacement before any medical or dental procedure.  Your dentist or physician should prescribe antibiotic prophylaxis prior to any invasive dental procedure or surgery.  Before undergoing MRI (magnetic resonance imaging), always notify the doctor (or medical technician) that you have an implanted heart valve.   If there are any changes in your condition, or you are hospitalized elsewhere, please notify the valve clinic.

## 2025-01-23 ENCOUNTER — ANESTHESIA EVENT (OUTPATIENT)
Dept: CARDIAC SURGERY | Facility: HOSPITAL | Age: 85
End: 2025-01-23
Payer: MEDICARE

## 2025-01-23 ENCOUNTER — HOSPITAL ENCOUNTER (OUTPATIENT)
Dept: CV DIAGNOSTICS | Facility: HOSPITAL | Age: 85
Discharge: HOME | End: 2025-01-23
Attending: INTERNAL MEDICINE
Payer: MEDICARE

## 2025-01-23 ENCOUNTER — HOSPITAL ENCOUNTER (INPATIENT)
Facility: HOSPITAL | Age: 85
LOS: 1 days | Discharge: HOME | End: 2025-01-24
Attending: INTERNAL MEDICINE | Admitting: INTERNAL MEDICINE
Payer: MEDICARE

## 2025-01-23 ENCOUNTER — ANESTHESIA (OUTPATIENT)
Dept: CARDIAC SURGERY | Facility: HOSPITAL | Age: 85
End: 2025-01-23
Payer: MEDICARE

## 2025-01-23 ENCOUNTER — APPOINTMENT (OUTPATIENT)
Dept: GENERAL RADIOLOGY | Facility: HOSPITAL | Age: 85
End: 2025-01-23
Attending: INTERNAL MEDICINE
Payer: MEDICARE

## 2025-01-23 LAB
ALBUMIN SERPL-MCNC: 3.5 G/DL (ref 3.2–4.8)
ALBUMIN/GLOB SERPL: 1.2 {RATIO} (ref 1–2)
ALP LIVER SERPL-CCNC: 53 U/L
ALT SERPL-CCNC: 15 U/L
ANION GAP SERPL CALC-SCNC: 7 MMOL/L (ref 0–18)
APTT PPP: 141.4 SECONDS (ref 23–36)
AST SERPL-CCNC: 23 U/L (ref ?–34)
BASE EXCESS BLD CALC-SCNC: 2 MMOL/L
BILIRUB SERPL-MCNC: 0.4 MG/DL (ref 0.2–1.1)
BUN BLD-MCNC: 12 MG/DL (ref 9–23)
CA-I BLD-SCNC: 1.06 MMOL/L (ref 1.12–1.32)
CALCIUM BLD-MCNC: 8.3 MG/DL (ref 8.7–10.6)
CHLORIDE SERPL-SCNC: 106 MMOL/L (ref 98–112)
CO2 BLD-SCNC: 27 MMOL/L (ref 22–32)
CO2 SERPL-SCNC: 29 MMOL/L (ref 21–32)
CREAT BLD-MCNC: 0.69 MG/DL
EGFRCR SERPLBLD CKD-EPI 2021: 86 ML/MIN/1.73M2 (ref 60–?)
ERYTHROCYTE [DISTWIDTH] IN BLOOD BY AUTOMATED COUNT: 14 %
GLOBULIN PLAS-MCNC: 2.9 G/DL (ref 2–3.5)
GLUCOSE BLD-MCNC: 119 MG/DL (ref 70–99)
GLUCOSE BLD-MCNC: 135 MG/DL (ref 70–99)
GLUCOSE BLD-MCNC: 137 MG/DL (ref 70–99)
HCO3 BLD-SCNC: 25.9 MEQ/L
HCT VFR BLD AUTO: 39.8 %
HCT VFR BLD CALC: 37 %
HGB BLD-MCNC: 13.3 G/DL
INR BLD: 1.18 (ref 0.8–1.2)
ISTAT ACTIVATED CLOTTING TIME: 141 SECONDS (ref 74–137)
ISTAT ACTIVATED CLOTTING TIME: 452 SECONDS (ref 74–137)
MAGNESIUM SERPL-MCNC: 2 MG/DL (ref 1.6–2.6)
MCH RBC QN AUTO: 29.1 PG (ref 26–34)
MCHC RBC AUTO-ENTMCNC: 33.4 G/DL (ref 31–37)
MCV RBC AUTO: 87.1 FL
MRSA DNA SPEC QL NAA+PROBE: NEGATIVE
NT-PROBNP SERPL-MCNC: 3255 PG/ML (ref ?–450)
OSMOLALITY SERPL CALC.SUM OF ELEC: 296 MOSM/KG (ref 275–295)
PCO2 BLD: 36.2 MMHG
PH BLD: 7.46 [PH]
PLATELET # BLD AUTO: 147 10(3)UL (ref 150–450)
PO2 BLD: 464 MMHG
POTASSIUM BLD-SCNC: 3.4 MMOL/L (ref 3.6–5.1)
POTASSIUM SERPL-SCNC: 3.7 MMOL/L (ref 3.5–5.1)
PROT SERPL-MCNC: 6.4 G/DL (ref 5.7–8.2)
PROTHROMBIN TIME: 15.1 SECONDS (ref 11.6–14.8)
RBC # BLD AUTO: 4.57 X10(6)UL
SAO2 % BLD: 100 %
SODIUM BLD-SCNC: 141 MMOL/L (ref 136–145)
SODIUM SERPL-SCNC: 142 MMOL/L (ref 136–145)
WBC # BLD AUTO: 7.1 X10(3) UL (ref 4–11)

## 2025-01-23 PROCEDURE — 93355 ECHO TRANSESOPHAGEAL (TEE): CPT | Performed by: INTERNAL MEDICINE

## 2025-01-23 PROCEDURE — 93010 ELECTROCARDIOGRAM REPORT: CPT | Performed by: INTERNAL MEDICINE

## 2025-01-23 PROCEDURE — 80053 COMPREHEN METABOLIC PANEL: CPT | Performed by: INTERNAL MEDICINE

## 2025-01-23 PROCEDURE — B24BZZ4 ULTRASONOGRAPHY OF HEART WITH AORTA, TRANSESOPHAGEAL: ICD-10-PCS | Performed by: INTERNAL MEDICINE

## 2025-01-23 PROCEDURE — 93312 ECHO TRANSESOPHAGEAL: CPT | Performed by: STUDENT IN AN ORGANIZED HEALTH CARE EDUCATION/TRAINING PROGRAM

## 2025-01-23 PROCEDURE — 83735 ASSAY OF MAGNESIUM: CPT | Performed by: INTERNAL MEDICINE

## 2025-01-23 PROCEDURE — 82803 BLOOD GASES ANY COMBINATION: CPT

## 2025-01-23 PROCEDURE — 84132 ASSAY OF SERUM POTASSIUM: CPT

## 2025-01-23 PROCEDURE — 84295 ASSAY OF SERUM SODIUM: CPT

## 2025-01-23 PROCEDURE — 82330 ASSAY OF CALCIUM: CPT

## 2025-01-23 PROCEDURE — 85730 THROMBOPLASTIN TIME PARTIAL: CPT | Performed by: INTERNAL MEDICINE

## 2025-01-23 PROCEDURE — 87641 MR-STAPH DNA AMP PROBE: CPT | Performed by: INTERNAL MEDICINE

## 2025-01-23 PROCEDURE — 85610 PROTHROMBIN TIME: CPT | Performed by: INTERNAL MEDICINE

## 2025-01-23 PROCEDURE — 85347 COAGULATION TIME ACTIVATED: CPT

## 2025-01-23 PROCEDURE — 85014 HEMATOCRIT: CPT

## 2025-01-23 PROCEDURE — 5A1223Z PERFORMANCE OF CARDIAC PACING, CONTINUOUS: ICD-10-PCS | Performed by: INTERNAL MEDICINE

## 2025-01-23 PROCEDURE — 71045 X-RAY EXAM CHEST 1 VIEW: CPT | Performed by: INTERNAL MEDICINE

## 2025-01-23 PROCEDURE — 85027 COMPLETE CBC AUTOMATED: CPT | Performed by: INTERNAL MEDICINE

## 2025-01-23 PROCEDURE — 82962 GLUCOSE BLOOD TEST: CPT

## 2025-01-23 PROCEDURE — 93005 ELECTROCARDIOGRAM TRACING: CPT

## 2025-01-23 PROCEDURE — 02RF38Z REPLACEMENT OF AORTIC VALVE WITH ZOOPLASTIC TISSUE, PERCUTANEOUS APPROACH: ICD-10-PCS | Performed by: INTERNAL MEDICINE

## 2025-01-23 PROCEDURE — 83880 ASSAY OF NATRIURETIC PEPTIDE: CPT | Performed by: INTERNAL MEDICINE

## 2025-01-23 DEVICE — SAPIEN 3 ULTRA RESILIA TRANSCATHETER HEART VALVE, 23MM
Type: IMPLANTABLE DEVICE | Site: AORTA | Status: FUNCTIONAL
Brand: SAPIEN 3 ULTRA RESILIA

## 2025-01-23 RX ORDER — FUROSEMIDE 40 MG/1
40 TABLET ORAL DAILY
Status: DISCONTINUED | OUTPATIENT
Start: 2025-01-24 | End: 2025-01-24

## 2025-01-23 RX ORDER — DEXAMETHASONE SODIUM PHOSPHATE 4 MG/ML
VIAL (ML) INJECTION AS NEEDED
Status: DISCONTINUED | OUTPATIENT
Start: 2025-01-23 | End: 2025-01-23 | Stop reason: SURG

## 2025-01-23 RX ORDER — AMLODIPINE AND BENAZEPRIL HYDROCHLORIDE 5; 10 MG/1; MG/1
1 CAPSULE ORAL DAILY
Status: DISCONTINUED | OUTPATIENT
Start: 2025-01-23 | End: 2025-01-23

## 2025-01-23 RX ORDER — SERTRALINE HYDROCHLORIDE 100 MG/1
100 TABLET, FILM COATED ORAL DAILY
Status: DISCONTINUED | OUTPATIENT
Start: 2025-01-23 | End: 2025-01-24

## 2025-01-23 RX ORDER — HYDROMORPHONE HYDROCHLORIDE 1 MG/ML
0.4 INJECTION, SOLUTION INTRAMUSCULAR; INTRAVENOUS; SUBCUTANEOUS EVERY 5 MIN PRN
Status: DISCONTINUED | OUTPATIENT
Start: 2025-01-23 | End: 2025-01-23

## 2025-01-23 RX ORDER — METOCLOPRAMIDE HYDROCHLORIDE 5 MG/ML
10 INJECTION INTRAMUSCULAR; INTRAVENOUS EVERY 8 HOURS PRN
Status: DISCONTINUED | OUTPATIENT
Start: 2025-01-23 | End: 2025-01-23

## 2025-01-23 RX ORDER — LABETALOL HYDROCHLORIDE 5 MG/ML
5 INJECTION, SOLUTION INTRAVENOUS EVERY 5 MIN PRN
Status: DISCONTINUED | OUTPATIENT
Start: 2025-01-23 | End: 2025-01-23

## 2025-01-23 RX ORDER — HYDROCODONE BITARTRATE AND ACETAMINOPHEN 5; 325 MG/1; MG/1
1 TABLET ORAL EVERY 6 HOURS PRN
Status: DISCONTINUED | OUTPATIENT
Start: 2025-01-23 | End: 2025-01-24

## 2025-01-23 RX ORDER — POLYETHYLENE GLYCOL 3350 17 G/17G
17 POWDER, FOR SOLUTION ORAL DAILY PRN
Status: DISCONTINUED | OUTPATIENT
Start: 2025-01-23 | End: 2025-01-24

## 2025-01-23 RX ORDER — ONDANSETRON 2 MG/ML
INJECTION INTRAMUSCULAR; INTRAVENOUS AS NEEDED
Status: DISCONTINUED | OUTPATIENT
Start: 2025-01-23 | End: 2025-01-23 | Stop reason: SURG

## 2025-01-23 RX ORDER — SODIUM PHOSPHATE, DIBASIC AND SODIUM PHOSPHATE, MONOBASIC 7; 19 G/230ML; G/230ML
1 ENEMA RECTAL ONCE AS NEEDED
Status: DISCONTINUED | OUTPATIENT
Start: 2025-01-23 | End: 2025-01-24

## 2025-01-23 RX ORDER — LOSARTAN POTASSIUM 25 MG/1
25 TABLET ORAL DAILY
Status: DISCONTINUED | OUTPATIENT
Start: 2025-01-23 | End: 2025-01-24

## 2025-01-23 RX ORDER — METOPROLOL TARTRATE 25 MG/1
25 TABLET, FILM COATED ORAL
Status: DISCONTINUED | OUTPATIENT
Start: 2025-01-23 | End: 2025-01-24

## 2025-01-23 RX ORDER — SENNOSIDES 8.6 MG
17.2 TABLET ORAL NIGHTLY PRN
Status: DISCONTINUED | OUTPATIENT
Start: 2025-01-23 | End: 2025-01-24

## 2025-01-23 RX ORDER — MEPERIDINE HYDROCHLORIDE 25 MG/ML
12.5 INJECTION INTRAMUSCULAR; INTRAVENOUS; SUBCUTANEOUS AS NEEDED
Status: DISCONTINUED | OUTPATIENT
Start: 2025-01-23 | End: 2025-01-23

## 2025-01-23 RX ORDER — SODIUM CHLORIDE 9 MG/ML
INJECTION, SOLUTION INTRAVENOUS CONTINUOUS PRN
Status: DISCONTINUED | OUTPATIENT
Start: 2025-01-23 | End: 2025-01-23 | Stop reason: SURG

## 2025-01-23 RX ORDER — ROCURONIUM BROMIDE 10 MG/ML
INJECTION, SOLUTION INTRAVENOUS AS NEEDED
Status: DISCONTINUED | OUTPATIENT
Start: 2025-01-23 | End: 2025-01-23 | Stop reason: SURG

## 2025-01-23 RX ORDER — LIDOCAINE HYDROCHLORIDE 10 MG/ML
INJECTION, SOLUTION EPIDURAL; INFILTRATION; INTRACAUDAL; PERINEURAL AS NEEDED
Status: DISCONTINUED | OUTPATIENT
Start: 2025-01-23 | End: 2025-01-23 | Stop reason: SURG

## 2025-01-23 RX ORDER — METOCLOPRAMIDE HYDROCHLORIDE 5 MG/ML
10 INJECTION INTRAMUSCULAR; INTRAVENOUS EVERY 8 HOURS PRN
Status: DISCONTINUED | OUTPATIENT
Start: 2025-01-23 | End: 2025-01-24

## 2025-01-23 RX ORDER — PROTAMINE SULFATE 10 MG/ML
INJECTION, SOLUTION INTRAVENOUS AS NEEDED
Status: DISCONTINUED | OUTPATIENT
Start: 2025-01-23 | End: 2025-01-23 | Stop reason: SURG

## 2025-01-23 RX ORDER — HYDRALAZINE HYDROCHLORIDE 20 MG/ML
10 INJECTION INTRAMUSCULAR; INTRAVENOUS EVERY 2 HOUR PRN
Status: DISCONTINUED | OUTPATIENT
Start: 2025-01-23 | End: 2025-01-24

## 2025-01-23 RX ORDER — ACETAMINOPHEN 325 MG/1
650 TABLET ORAL EVERY 6 HOURS PRN
Status: DISCONTINUED | OUTPATIENT
Start: 2025-01-23 | End: 2025-01-24

## 2025-01-23 RX ORDER — FAMOTIDINE 20 MG/1
20 TABLET, FILM COATED ORAL 2 TIMES DAILY
Status: DISCONTINUED | OUTPATIENT
Start: 2025-01-23 | End: 2025-01-24

## 2025-01-23 RX ORDER — DIPHENHYDRAMINE HYDROCHLORIDE 50 MG/ML
INJECTION INTRAMUSCULAR; INTRAVENOUS AS NEEDED
Status: DISCONTINUED | OUTPATIENT
Start: 2025-01-23 | End: 2025-01-23 | Stop reason: SURG

## 2025-01-23 RX ORDER — AMLODIPINE BESYLATE 5 MG/1
5 TABLET ORAL DAILY
Status: DISCONTINUED | OUTPATIENT
Start: 2025-01-23 | End: 2025-01-24

## 2025-01-23 RX ORDER — ALBUMIN HUMAN 50 G/1000ML
12.5 SOLUTION INTRAVENOUS ONCE AS NEEDED
Status: ACTIVE | OUTPATIENT
Start: 2025-01-23 | End: 2025-01-23

## 2025-01-23 RX ORDER — HYDROCODONE BITARTRATE AND ACETAMINOPHEN 5; 325 MG/1; MG/1
1 TABLET ORAL ONCE AS NEEDED
Status: DISCONTINUED | OUTPATIENT
Start: 2025-01-23 | End: 2025-01-23

## 2025-01-23 RX ORDER — KETOROLAC TROMETHAMINE 30 MG/ML
INJECTION, SOLUTION INTRAMUSCULAR; INTRAVENOUS AS NEEDED
Status: DISCONTINUED | OUTPATIENT
Start: 2025-01-23 | End: 2025-01-23

## 2025-01-23 RX ORDER — HYDROMORPHONE HYDROCHLORIDE 1 MG/ML
0.2 INJECTION, SOLUTION INTRAMUSCULAR; INTRAVENOUS; SUBCUTANEOUS EVERY 5 MIN PRN
Status: DISCONTINUED | OUTPATIENT
Start: 2025-01-23 | End: 2025-01-23

## 2025-01-23 RX ORDER — NALOXONE HYDROCHLORIDE 0.4 MG/ML
0.08 INJECTION, SOLUTION INTRAMUSCULAR; INTRAVENOUS; SUBCUTANEOUS AS NEEDED
Status: DISCONTINUED | OUTPATIENT
Start: 2025-01-23 | End: 2025-01-23

## 2025-01-23 RX ORDER — SODIUM CHLORIDE, SODIUM LACTATE, POTASSIUM CHLORIDE, CALCIUM CHLORIDE 600; 310; 30; 20 MG/100ML; MG/100ML; MG/100ML; MG/100ML
INJECTION, SOLUTION INTRAVENOUS CONTINUOUS
Status: DISCONTINUED | OUTPATIENT
Start: 2025-01-23 | End: 2025-01-23

## 2025-01-23 RX ORDER — HYDROCODONE BITARTRATE AND ACETAMINOPHEN 5; 325 MG/1; MG/1
2 TABLET ORAL EVERY 6 HOURS PRN
Status: DISCONTINUED | OUTPATIENT
Start: 2025-01-23 | End: 2025-01-24

## 2025-01-23 RX ORDER — PHENYLEPHRINE HCL 10 MG/ML
VIAL (ML) INJECTION AS NEEDED
Status: DISCONTINUED | OUTPATIENT
Start: 2025-01-23 | End: 2025-01-23 | Stop reason: SURG

## 2025-01-23 RX ORDER — POTASSIUM CHLORIDE 1500 MG/1
40 TABLET, EXTENDED RELEASE ORAL ONCE
Status: COMPLETED | OUTPATIENT
Start: 2025-01-23 | End: 2025-01-23

## 2025-01-23 RX ORDER — ONDANSETRON 2 MG/ML
4 INJECTION INTRAMUSCULAR; INTRAVENOUS EVERY 6 HOURS PRN
Status: DISCONTINUED | OUTPATIENT
Start: 2025-01-23 | End: 2025-01-23

## 2025-01-23 RX ORDER — FAMOTIDINE 10 MG/ML
20 INJECTION, SOLUTION INTRAVENOUS 2 TIMES DAILY
Status: DISCONTINUED | OUTPATIENT
Start: 2025-01-23 | End: 2025-01-24

## 2025-01-23 RX ORDER — IOPAMIDOL 755 MG/ML
200 INJECTION, SOLUTION INTRAVASCULAR
Status: COMPLETED | OUTPATIENT
Start: 2025-01-23 | End: 2025-01-23

## 2025-01-23 RX ORDER — ESMOLOL HYDROCHLORIDE 10 MG/ML
INJECTION INTRAVENOUS AS NEEDED
Status: DISCONTINUED | OUTPATIENT
Start: 2025-01-23 | End: 2025-01-23

## 2025-01-23 RX ORDER — BISACODYL 10 MG
10 SUPPOSITORY, RECTAL RECTAL
Status: DISCONTINUED | OUTPATIENT
Start: 2025-01-23 | End: 2025-01-24

## 2025-01-23 RX ORDER — SODIUM CHLORIDE 9 MG/ML
INJECTION, SOLUTION INTRAVENOUS CONTINUOUS
Status: DISCONTINUED | OUTPATIENT
Start: 2025-01-23 | End: 2025-01-23

## 2025-01-23 RX ORDER — NITROGLYCERIN 20 MG/100ML
INJECTION INTRAVENOUS CONTINUOUS PRN
Status: DISCONTINUED | OUTPATIENT
Start: 2025-01-23 | End: 2025-01-24

## 2025-01-23 RX ORDER — HYDROCODONE BITARTRATE AND ACETAMINOPHEN 5; 325 MG/1; MG/1
2 TABLET ORAL ONCE AS NEEDED
Status: DISCONTINUED | OUTPATIENT
Start: 2025-01-23 | End: 2025-01-23

## 2025-01-23 RX ORDER — HYDROMORPHONE HYDROCHLORIDE 1 MG/ML
0.6 INJECTION, SOLUTION INTRAMUSCULAR; INTRAVENOUS; SUBCUTANEOUS EVERY 5 MIN PRN
Status: DISCONTINUED | OUTPATIENT
Start: 2025-01-23 | End: 2025-01-23

## 2025-01-23 RX ORDER — ACETAMINOPHEN 500 MG
1000 TABLET ORAL ONCE AS NEEDED
Status: DISCONTINUED | OUTPATIENT
Start: 2025-01-23 | End: 2025-01-23

## 2025-01-23 RX ORDER — HEPARIN SODIUM 1000 [USP'U]/ML
INJECTION, SOLUTION INTRAVENOUS; SUBCUTANEOUS AS NEEDED
Status: DISCONTINUED | OUTPATIENT
Start: 2025-01-23 | End: 2025-01-23 | Stop reason: SURG

## 2025-01-23 RX ADMIN — DEXAMETHASONE SODIUM PHOSPHATE 4 MG: 4 MG/ML VIAL (ML) INJECTION at 09:58:00

## 2025-01-23 RX ADMIN — HEPARIN SODIUM 16000 UNITS: 1000 INJECTION, SOLUTION INTRAVENOUS; SUBCUTANEOUS at 10:23:00

## 2025-01-23 RX ADMIN — ESMOLOL HYDROCHLORIDE 40 MG: 10 INJECTION INTRAVENOUS at 09:38:00

## 2025-01-23 RX ADMIN — DIPHENHYDRAMINE HYDROCHLORIDE 12.5 MG: 50 INJECTION INTRAMUSCULAR; INTRAVENOUS at 09:58:00

## 2025-01-23 RX ADMIN — SODIUM CHLORIDE: 9 INJECTION, SOLUTION INTRAVENOUS at 09:55:00

## 2025-01-23 RX ADMIN — PROTAMINE SULFATE 50 MG: 10 INJECTION, SOLUTION INTRAVENOUS at 10:51:00

## 2025-01-23 RX ADMIN — KETOROLAC TROMETHAMINE 30 MG: 30 INJECTION, SOLUTION INTRAMUSCULAR; INTRAVENOUS at 10:56:00

## 2025-01-23 RX ADMIN — ROCURONIUM BROMIDE 50 MG: 10 INJECTION, SOLUTION INTRAVENOUS at 09:42:00

## 2025-01-23 RX ADMIN — LIDOCAINE HYDROCHLORIDE 25 MG: 10 INJECTION, SOLUTION EPIDURAL; INFILTRATION; INTRACAUDAL; PERINEURAL at 09:42:00

## 2025-01-23 RX ADMIN — SODIUM CHLORIDE: 9 INJECTION, SOLUTION INTRAVENOUS at 11:14:00

## 2025-01-23 RX ADMIN — ONDANSETRON 4 MG: 2 INJECTION INTRAMUSCULAR; INTRAVENOUS at 10:56:00

## 2025-01-23 RX ADMIN — PHENYLEPHRINE HCL 100 MCG: 10 MG/ML VIAL (ML) INJECTION at 10:39:00

## 2025-01-23 RX ADMIN — SODIUM CHLORIDE: 9 INJECTION, SOLUTION INTRAVENOUS at 09:35:00

## 2025-01-23 NOTE — ANESTHESIA PROCEDURE NOTES
Airway  Date/Time: 1/23/2025 9:45 AM  Urgency: elective      General Information and Staff    Patient location during procedure: OR  Anesthesiologist: Antonio Campoverde MD  Performed: anesthesiologist   Performed by: Antonio Campoverde MD  Authorized by: Antonio Campoverde MD      Indications and Patient Condition  Indications for airway management: anesthesia  Spontaneous Ventilation: absent  Sedation level: deep  Preoxygenated: yes  Patient position: sniffing      Final Airway Details  Final airway type: endotracheal airway      Successful airway: ETT  Cuffed: yes   Successful intubation technique: direct laryngoscopy  Facilitating devices/methods: intubating stylet  Endotracheal tube insertion site: oral  Blade: Rodger  Blade size: #3  ETT size (mm): 7.0    Cormack-Lehane Classification: grade IIA - partial view of glottis  Placement verified by: capnometry   Measured from: teeth  ETT to teeth (cm): 22  Number of attempts at approach: 1    Additional Comments  Dentition unchanged after DL and intubation, atraumatic procedure.

## 2025-01-23 NOTE — ANESTHESIA POSTPROCEDURE EVALUATION
[facility]    Erica Schwartz Patient Status:  Inpatient   Age/Gender 84 year old female MRN [id number]   Location [facility] 6NE-A Attending Lazarus Polanco MD   Hosp Day # 0 PCP Nomi Weber DO       Anesthesia Post-op Note    TRANSCATHETER AORTIC VALVE REPLACEMENT FEMORAL APPROACH, 23MM JESSE    Procedure Summary       Date: 01/23/25 Room / Location:  [facility] 03 HYBRID /  [facility]    Anesthesia Start: 0935 Anesthesia Stop: 1119    Procedure: TRANSCATHETER AORTIC VALVE REPLACEMENT FEMORAL APPROACH, 23MM JESSE (Groin) Diagnosis: (AORTIC STENOSIS)    Surgeons: Lazarus Polanco MD Anesthesiologist: Antonio Campoverde MD    Anesthesia Type: general ASA Status: 4            Anesthesia Type: general    Vitals Value Taken Time   /68 01/23/25 1200   Temp 97.3 °F (36.3 °C) 01/23/25 1130   Pulse 61 01/23/25 1210   Resp 19 01/23/25 1210   SpO2 99 % 01/23/25 1210   Vitals shown include unfiled device data.        Patient Location: ICU    Anesthesia Type: general    Airway Patency: patent    Postop Pain Control: adequate    Mental Status: preanesthetic baseline    Nausea/Vomiting: none    Cardiopulmonary/Hydration status: stable euvolemic    Complications: no apparent anesthesia related complications    Postop vital signs: stable    Dental Exam: Unchanged from Preop    Patient to be discharged from PACU when criteria met.

## 2025-01-23 NOTE — OPERATIVE REPORT
[facility]    PATIENT'S NAME: CHET STEVENS   ATTENDING PHYSICIAN: Lazarus Polanco M.D.   OPERATING PHYSICIAN: Johann Hawkins M.D.   PATIENT ACCOUNT#:   [id number]    LOCATION:  [address] [place] [address]  MEDICAL RECORD #:   [id number]       YOB: 1940  ADMISSION DATE:       01/23/2025      OPERATION DATE:  01/23/2025    OPERATIVE REPORT    PREOPERATIVE DIAGNOSIS:  Severe symptomatic aortic stenosis.    POSTOPERATIVE DIAGNOSIS:    PROCEDURE PERFORMED:  Transcatheter aortic valve replacement.    OPERATORS:  Eber Bowers MD; Lazarus Polanco MD; Johann Hawkins MD.    DESCRIPTION OF PROCEDURE:  After informed consent was obtained, the patient was prepped and draped in the usual sterile fashion.  Patient underwent general anesthesia in the hybrid operating room.  Using ultrasound, right and left common femoral arteries were identified and entered using a micropuncture technique.  Two Perclose devices were placed in a preclose fashion in the right common femoral artery.  Ultimately, a 14-Amharic Billy eSheath was placed.  A pigtail catheter was advanced via the left common femoral artery into the aortic annulus to achieve imaging.    Using DUANE interrogation, patient was noted to have severe calcific-appearing stenosis that we were concerned that we would not be able to cross with a valve.  Therefore, using an AL1 catheter with a straight wire, we got into the left ventricle with placement of a Savvy pacing wire in the left ventricular apex.  A 20 mm True balloon was used to perform balloon aortic valvuloplasty.  Next, a 23 mm Billy S3 Ryan valve with Resilia technology was prepped and brought across the aortic annulus and deployed with rapid pacing using the SavvyWire.  Subsequent DUANE interrogation revealed good placement of the valve without significant insufficiency or stenosis.  However, patient was now noted to have complete heart block.  Therefore, we placed a transvenous pacemaker  via the left common femoral vein site and placed a pacemaker in the right ventricle.  Good pacing thresholds were obtained.  The Perclose devices were reversed in the right common femoral artery site to achieve hemostasis.  A Perclose device was placed in the left common femoral artery site to achieve hemostasis.  With supportive pacing for a few minutes, the patient appeared to regain a sinus rhythm.  We discussed this with our electrophysiology colleague.  Decision was ultimately made to leave the temporary pacemaker wire as the patient was transported to the intensive care unit and will reassess in the next few hours to see whether or not she will need a permanent pacemaker.    SUMMARY:  In summary, the patient today underwent placement of a 23 mm Billy S3 valve with Resilia technology as noted above.  The patient had transient heart block.  She currently has her sinus rhythm, but a temporary transvenous pacemaker will be left in place as backup, and further decisions regarding permanent pacemaker placement will be made during her clinical course here.     Dictated By Johann Hawkins M.D.  d: 01/23/2025 11:16:22  t: 01/23/2025 13:35:25  [facility] [id number]  AR/

## 2025-01-23 NOTE — H&P
[facility]     PATIENT'S NAME: CHET STEVENS   ATTENDING PHYSICIAN: Lazarus Polanco M.D.   CONSULTING PHYSICIAN: Lazarus Polanco M.D.   PATIENT ACCOUNT#:   [id number]    LOCATION:  [facility]  MEDICAL RECORD #:   [id number]       YOB: 1940  ADMISSION DATE:       12/05/2024      CONSULT DATE:  12/05/2024     REPORT OF CONSULTATION     HISTORY OF PRESENT ILLNESS:  This is the first office visit for the patient with me.  I am seeing her today in the Structural Heart Clinic for consideration of percutaneous transcatheter aortic valve replacement.     The patient, to the best of her knowledge, has had a lifelong murmur, yet no adverse effects from a cardiovascular standpoint.  She is originally from [place] and her family moved her out here 3 years ago, and she is now living in the [facility].  She is very active.     She has had no overt heart failure but has had a falloff in her overall exertional tolerance with exertional dyspnea.  She still performs all her activities of daily living, but some of her physical activity has slowed slightly due to shortness of breath with exertion.     Her most recent echocardiogram has demonstrated significant progression of her aortic stenosis.  She has preserved LV systolic function.  Peak velocity across the aortic valve was 5.4 m/sec.  Mean gradient 76 mmHg.  Dimensionless index 0.22.     Her electrocardiogram reveals normal sinus rhythm.  No ischemic change.     She had a bleeding ulcer on aspirin therapy after a total knee replacement.  No recent symptoms in this regard.     PHYSICAL EXAMINATION:  On examination, she is very pleasant and cognitively intact.  Lungs are clear.  She is in sinus rhythm with an obvious aortic stenotic murmur.     LABORATORY DATA:  BUN 13, creatinine 0.74.  I have not seen a recent hemoglobin.     Her STS is 4.48%.     We had a nice discussion today along with her daughter-in-law, Catherine, regarding her aortic  stenosis and the options for treatment including medical management, surgical valve replacement, or percutaneous valve replacement.  Obviously, with severe underlying stenosis and evolving clinical symptomatology at age 84, a percutaneous approach to her valve would be preferable if technically feasible.     We talked about the risks, benefits, and alternatives of all these approaches.  I think she has a very nice understanding.  She will move forward with her testing today, and then we will discuss her with the structural heart team at next week's case review.     Dictated By Lazarus Polanco M.D.  d:12/05/2024 09:21:41  t:12/05/2024 13:10:15  [id number]  WS/            Last signed by: Lazarus Polanco MD at 12/9/2024  1:21 PM   Electronically signed by Lazarus Polanco MD at 12/9/2024  1:21 PM      Reviewed again this morning with the entire structural heart team    Will proceed with an attempt at TAVR as planned.    CXR clear    ECG sinus bradycardia with first degree AV block and NSIVCD    15/0.8    Pro-bnp 3255    Hgb 15.3

## 2025-01-23 NOTE — ANESTHESIA PROCEDURE NOTES
Procedure Performed: DUANE       Start Time:  1/23/2025 9:52 AM       End Time:      Preanesthesia Checklist:  Patient identified, IV assessed, risks and benefits discussed, monitors and equipment assessed, procedure being performed at surgeon's request and anesthesia consent obtained.    General Procedure Information  Diagnostic Indications for Echo:  assessment of surgical repair  Physician Requesting Echo: Lazarus Polanco MD  Location performed:  Cath lab  Intubated  Bite block placed  Heart visualized  Probe Insertion:  Easy        Anesthesia Information  Performed Personally  Anesthesiologist:  Antonio Campoverde MD      Echocardiogram Comments:         DUANE probe successfully inserted by anesthesiologist; atraumatic insertion with dentition unchanged after esophageal intubation. See separate cardiologist report for findings.    Post  Post Intervention Follow-up Study:See addtional report

## 2025-01-23 NOTE — ANESTHESIA PREPROCEDURE EVALUATION
PRE-OP EVALUATION    Patient Name: Erica Schwartz    Admit Diagnosis: AORTIC STENOSIS    Pre-op Diagnosis: AORTIC STENOSIS    TRANSCATHETER AORTIC VALVE REPLACEMENT FEMORAL APPROACH, 23MM JESSE    Anesthesia Procedure: TRANSCATHETER AORTIC VALVE REPLACEMENT FEMORAL APPROACH, 23MM JESSE    Surgeons and Role:     * Lazarus Polanco MD - Primary     * Reynadlo Pop MD     * Johann Banerjee MD     * Eber Bowers MD     * Luis Fowler MD    Pre-op vitals reviewed.  Temp: 97.4 °F (36.3 °C)  Pulse: 63  Resp: 33  BP: 163/96  SpO2: 97 %  There is no height or weight on file to calculate BMI.    Current medications reviewed.  Hospital Medications:   sodium chloride 0.9% infusion   Intravenous Continuous       Outpatient Medications:   Prescriptions Prior to Admission[1]    Allergies: Aspirin      Anesthesia Evaluation  Patient Active Problem List   Diagnosis    Primary hypertension    Anxiety and depression    Tinnitus of both ears    Conductive hearing loss, bilateral    Cardiac murmur    Thyroid nodule    Carpal tunnel syndrome of right wrist    Prediabetes    Primary osteoarthritis of right knee    History of total right knee replacement    History of gastric ulcer    History of duodenal ulcer    Mixed hyperlipidemia    Nonrheumatic aortic valve stenosis    Diverticulosis    GARSIA (dyspnea on exertion)        Past Medical History:    Anemia    Anxiety    Bilateral leg edema    COVID    no fever , just tired    Disorder of thyroid    Diverticulosis    Hearing impairment    bilateral hearing aids    Heartburn    High blood pressure    High cholesterol    History of blood transfusion    History of cardiac murmur    History of palpitations    History of stomach ulcers    HTN (hypertension)    Hyperlipidemia    Lower GI bleed    Osteoarthritis    Palpitations    Prediabetes    Tinnitus    Visual impairment    glasses    Wears glasses          Past Surgical History:   Procedure Laterality Date    Carpal tunnel  release Right     Cholecystectomy      Colonoscopy N/A 05/30/2023    Procedure: COLONOSCOPY;  Surgeon: Kevin Shane DO;  Location:  ENDOSCOPY    Hysterectomy      Knee replacement surgery      Other surgical history      PLATER WARTS    Total knee replacement Right 05/23/2023    Upper gi endoscopy,exam       Social History     Socioeconomic History    Marital status:    Tobacco Use    Smoking status: Never    Smokeless tobacco: Never   Vaping Use    Vaping status: Never Used   Substance and Sexual Activity    Alcohol use: Yes     Comment: 1 glass of wine nightly    Drug use: Never     History   Drug Use Unknown     Available pre-op labs reviewed.  Lab Results   Component Value Date    WBC 7.5 01/14/2025    RBC 5.31 (H) 01/14/2025    HGB 15.3 01/14/2025    HCT 47.0 01/14/2025    MCV 88.5 01/14/2025    MCH 28.8 01/14/2025    MCHC 32.6 01/14/2025    RDW 14.4 01/14/2025    .0 01/14/2025     Lab Results   Component Value Date     01/14/2025    K 4.4 01/14/2025     01/14/2025    CO2 32.0 01/14/2025    BUN 15 01/14/2025    CREATSERUM 0.82 01/14/2025     (H) 01/14/2025    CA 9.7 01/14/2025            Airway      Mallampati: II  Mouth opening: >3 FB  TM distance: 4 - 6 cm  Neck ROM: full Cardiovascular      Rhythm: regular  Rate: normal     Dental    Dentition appears grossly intact         Pulmonary    Pulmonary exam normal.  Breath sounds clear to auscultation bilaterally.               Other findings              ASA: 4   Plan: general  NPO status verified and     Post-procedure pain management plan discussed with surgeon and patient.    Comment:  I explained intrinsic risks of general anesthesia, including nausea, dental damage, sore throat, mouth injury,and hoarseness from airway management.  All questions were answered and understanding was demonstrated of risks.  Informed permission was obtained to proceed as documented in the signed consent form.      Plan/risks discussed  with: patient  Use of blood product(s) discussed with: patient    Consented to blood products.          Present on Admission:  **None**             [1]   Medications Prior to Admission   Medication Sig Dispense Refill Last Dose/Taking    sertraline 100 MG Oral Tab Take 1 tablet (100 mg total) by mouth daily. 90 tablet 3 2025 Morning    furosemide 40 MG Oral Tab Take 1 tablet (40 mg total) by mouth daily.   2025 Morning    losartan 25 MG Oral Tab Take 1 tablet (25 mg total) by mouth daily.   2025 Morning    simvastatin 20 MG Oral Tab Take 1 tablet (20 mg total) by mouth nightly. 90 tablet 0 2025 Bedtime    amLODIPine Besy-Benazepril HCl 5-10 MG Oral Cap Take 1 capsule by mouth daily. 90 capsule 0 2025 Morning    nadolol 40 MG Oral Tab Take 1 tablet (40 mg total) by mouth daily.   2025 Morning    losartan 50 MG Oral Tab Take 1 tablet (50 mg total) by mouth daily. (Patient not taking: Reported on 2025)       ALPRAZolam 0.5 MG Oral Tab Take 1 tablet (0.5 mg total) by mouth 2 (two) times daily as needed for Anxiety or Sleep. 120 tablet 0     [] amoxicillin clavulanate 875-125 MG Oral Tab Take 1 tablet by mouth 2 (two) times daily for 5 days. 10 tablet 0     Neomycin-Polymyxin-HC 1 % Otic Solution Place 4 drops in ear(s) in the morning, at noon, and at bedtime. (Patient not taking: Reported on 2025) 1 each 0     B Complex-C-Folic Acid Oral Tab Take by mouth. (Patient not taking: Reported on 2025)       Zinc 50 MG Oral Tab Take by mouth. (Patient not taking: Reported on 2025)       cyanocobalamin 1000 MCG Oral Tab Take 1 tablet (1,000 mcg total) by mouth daily. (Patient not taking: Reported on 2025)       Vitamin C 500 MG Oral Tab Take 2 tablets (1,000 mg total) by mouth daily. (Patient not taking: Reported on 2025)       cholecalciferol 50 MCG (2000 UT) Oral Cap Take 1 capsule (2,000 Units total) by mouth daily. (Patient not taking: Reported on 2025)

## 2025-01-23 NOTE — ANESTHESIA PROCEDURE NOTES
Arterial Line    Performed by: Antonio Campoverde MD  Authorized by: Antonio Campoverde MD    General Information and Staff    Procedure Start:   Procedure End:  1/23/2025 9:48 AM  Anesthesiologist:  Antonio Campoverde MD  Performed By:  Anesthesiologist  Patient Location:  OR  Indication: continuous blood pressure monitoring    Site Identification: real time ultrasound guided and image stored and retrievable    Preanesthetic Checklist: 2 patient identifiers, IV checked, risks and benefits discussed, monitors and equipment checked, pre-op evaluation, timeout performed, anesthesia consent and sterile technique used    Procedure Details    Catheter Size:  20 G  Catheter Length:  1 and 3/4 inch  Catheter Type:  Angiocath  Seldinger Technique?: No    Laterality:  Left  Site:  Radial artery  Site Prep: alcohol swabs    Line Secured:  Tape and Tegaderm    Assessment    Events: patient tolerated procedure well with no complications      Medications      Additional Comments

## 2025-01-23 NOTE — OPERATIVE REPORT
OPERATIVE REPORT    PATIENT'S NAME: Erica Schwartz  DATE: 2025  : 1940  CSN: [id number]     PREOPERATIVE DIAGNOSIS:    Severe symptomatic aortic stenosis.  ***   POSTOPERATIVE DIAGNOSIS:  Same  PROCEDURE PERFORMED:  Transcatheter aortic valve replacement with {TAVR Valve Size:04813} mm {TAVR Valve Type:19269::\"Billy Ryan 3 Ultra Resilia\"} valve     OPERATORS:  {TAVR Operators:27477}  SURGEON: Luis Fowler MD    INDICATION: Erica Schwartz is a 84 year old female with symptomatic severe aortic stenosis who has been reviewed by our multidisciplinary TAVR committee and deemed suitable for placement of a transcatheter aortic valve replacement.      DESCRIPTION OF PROCEDURE:  After informed consent was obtained, the patient was prepped and draped in the usual sterile fashion.  The patient underwent general anesthesia.  Bilateral common femoral arteries were accessed using a micropuncture needle and sheath. A 14-Tamazight sheath was placed on the {RIGHT OR LEFT:1744} side.  Heparin was given.  A pigtail catheter was advanced to the aortic root to obtain images of the annulus. A {TAVR Valve Size:23279} mm {TAVR Valve Type:82855::\"Billy Ryan 3 Ultra Resilia\"} valve was chosen and prepped accordingly with {TAVR Contrast Amount:54493::\"nominal\"} contrast.  The aortic valve was crossed. The valve was brought into the abdominal aorta and paired with its balloon.  It was brought across the aortic annulus and deployed with rapid pacing.  Followup DUANE interrogation revealed {TAVR ECHO:65775} {TAVR Regur::\"aortic\"} insufficiency.  {TAVR Post Dilation (Optional):37423}The delivery device was removed.  The {RIGHT OR LEFT:1744} side was closed using {TAVR Closure:02958} devices and a {TAVR Closure:58845} device was used on the {RIGHT OR LEFT:1744} side to achieve hemostasis.    Patient tolerate the procedure without difficulty.       Luis Fowler MD  [facility]    removed.  The Right side was closed using Perclose devices and a Perclose device was used on the Left side to achieve hemostasis.    Patient tolerate the procedure without difficulty.       Luis Fowler MD  [facility]

## 2025-01-23 NOTE — HISTORICAL OFFICE NOTE
[facility]  [address] [place] [address], [address] [place] [address]  [phone]    Document Amended  Erica Schwartz  Progress Note  Demographics:  Name: Erica Schwartz YOB: 1940  Age: 84, Female Medical Record No: [id number]  Visited Date/Time: 10/30/2024 09:00 AM    Chief Complaints  annual f/u   pt c/o elevated BP readings  History of Present Illness  Follow-up visit for Erica.  84-year-old with history of moderate aortic valve stenosis, hypertension and hyperlipidemia.  She tells me that since the last seen her most recently she has had a difficult time controlling her blood pressure.  She is on a diuretic and losartan 25 mg daily and shows me readings that are as high as 170 to 180 mmHg in the morning.  She describes increased shortness of breath.  Her peak aortic valve velocity was 3.5 m/s a year ago.  She denies near-syncope or syncope.  She denies chest pain.  Cardiac risk factors Hypertension, Dyslipidemia and Never smoked  Past Medical History  1.Primary hypertension  2.Aortic valve stenosis AS (nonrheumatic)  3.History of bleeding peptic ulcer  4.Cardiac Risk Assessment, pre-operative  5.Murmur, heart  6.Hyperlipidemia, mixed  7.Bilateral leg edema  8.Palpitations  9.Dizziness  10.Right arm numbness  11.Tinnitus of both ears  12.Depression with anxiety  Past Surgical History  1.H/O total knee replacement, right  2.Carpal tunnel release  3.History of hysterectomy - Hx  4.History of cholecystectomy - Hx  Family History  1. Father - Candidal sepsis (Reason for death)  2. Mother - Old MI (myocardial infarction)  Social History  Smoking status Never smoked  Tobacco usage - No (Non-smoker for personal reasons (finding))  Alcohol usage - Yes (wine at night)  Review of systems  Cardiovascular GARSIA  No history of Chest pain, Palpitations, Syncope, PND, Orthopnea, Edema and Claudication  Physical Examination  Vitals Right Arm Sitting  / 86 mmHg, Pulse rate 60 bpm, Height in 5' 2\",  BMI: 30.2, Weight in 165 lbs (or) 74.84 kgs and BSA : 1.84 cc/m²  General Appearance No Acute Distress  Cardiovascular s1, s2 reg, mid to late peaking systolic murmur and  Lower Extremities no edema  EKG/Other abnormalities  HEENT:  EOMI, PERRL  Neck: trachea midline  Lungs: CTAB  Skin: warm and dry  Neuro: moving extremities appropriately  Allergies  1.aspirin \"Ingredient (IN)\" [RxNorm:1191](Reaction:Bleeding [Snomed:[id number]], Severity:Severe)  Medications (Info obtained by: Verbal)  1.ALPRAZolam 0.5 mg tablet, Take 1 tablet orally 2 times a day as needed for sleep and Anxiety  2.AMLODIPINE-BENAZ 5/10MG CAPSULES, TAKE 1 CAPSULE BY MOUTH EVERY DAY  3.furosemide 40 mg tablet, Take 1 tablet orally once a day as needed.  4.LOSARTAN 25MG TABLETS, TAKE 1 TABLET BY MOUTH EVERY DAY  5.nadoloL 40 mg tablet, Take 1 tablet orally once a day.  6.sertraline 100 mg tablet, Take 1 tablet orally once a day.  7.simvastatin 20 mg tablet, Take 1 tablet orally once a day.  8.losartan 50 mg tablet, Take 1 tablet orally once a day.  Impression  1.Primary hypertension  2.Aortic valve stenosis AS (nonrheumatic)  3.Hyperlipidemia, mixed  Assessment & Plan  Patient with a harsh mid-to-late peaking systolic murmur suggestive of at least moderate aortic stenosis and now symptoms of dyspnea on exertion.  Will check an echocardiogram today at 130.  Based on that I will give her further recommendations she may need to be considered for aortic valve replacement.  For now we need better blood pressure control.  I like to enroll her in our RPM clinic which she is agreeable with.  She would also start taking a higher dose losartan at 50 mg daily.  We will send a new prescription to her pharmacy.  Follow-up based on what the echo shows later today.    Recommend low sodium diet, daily exercise and maintain a normal BMI. Recommend monitor blood pressure at home.  Labs and Diagnostics ordered  1.EKG (electrocardiogram) (Today)  Future  appointments  1.Follow up visit - Adi Gallagher MD (Schedule next available)  Miscellaneous  1.Reviewed trans thoracic echocardiogram with the patient.  2.Weight monitoring (regime/therapy)  Nurses documentation  Triage - Nursing Doc:  Upcoming surgeries: none  Use of assistive devices(s): no  Refills: none  EKG: yes  Triage & medication list reviewed by: LINDA GLASGOW     Patient instructions  1. Echocardiogram today at 1:30pm in our diagnostics department.     Your provider has ordered an echocardiogram. This is an ultrasound of your heart to further assess its function and  valves. You may require an IV. You will be required to remove your shirt AND BRA, no all in-in-ones, dresses or  coveralls. This test takes approximately 45 to 60 minutes.     2. Enroll in our RPM program for hypertension.     3. Increase losartan to 50 mg daily.     4. Follow up with Dr. Gallagher after your echocardiogram.   Diagnostics Details  Trans Thoracic Echocardiogram 05/04/2023  1.The study quality is average.    2.Technically difficult study due to patients body habitus.    3.The left ventricle is normal in size. Global left ventricular systolic function is normal. The left ventricular ejection fraction is 60%. The left ventricle diastolic function is impaired (Grade I) with normal left atrial pressure. The wall thickness is normal. No regional wall motion abnormalities were identified.    4. Moderate aortic valve stenosis is present. Aortic valve area continuity equation is 0.9 cm². The trans-aortic peak velocity is 3.5 m/sec. The trans-aortic mean gradient is 35.0 mmHg. Moderate calcification of the aortic valve is noted.    5.The right ventricle is normal in size. Right ventricular systolic function is normal.    6.Pulmonary artery pressure cannot be assessed due to inadequate TR jet, but is likely normal.    CPOE Orders carried out by: Tatiana MCKEON  Care Providers: Adi Gallagher MD and Tatiana MCKEON  Electronically  Authenticated by  Adi Gallagher MD  10/30/2024 01:52:46 PM  Amendment Request : 1  Echo confirms now critical AS.  Patient with no CP or heart failure symptoms but increased fatigue.  Will refer for TAVR eval with next available appt.  Will likely need diagnostic LHC as well.  Requested By Initiated By Accepted By  Adi Gallagher MD  10/30/2024 02:36:51 PM 10/30/2024 02:36:51 PM 10/30/2024 02:36:51 PM  Disclaimer: Components of this note were documented using voice recognition system and are subject to errors not corrected at proofreading. Contact the author of this note for any clarifications.

## 2025-01-23 NOTE — PROCEDURES
Cardiology Transesophageal Echo Note     PRE and POST PROCEDURE DIAGNOSIS:   1. Aortic stenosis, severe and symptomatic.  2. S/pTAVR (23 mm S3 Ryan)     PROCEDURE: Transesophageal Echocardiogram DUANE - intra-operative during TAVR.    The patient was already intubated and sedated by anesthesiologist. DUANE probe placed by anesthesia.  DUANE probe was advanced to mid-esophageal level and transesophageal views were obtained and reviewed. Gastric views were obtained. The patient was stable hemodynamically and tolerated procedure very well. No pericardial effusion      DUANE guidance to cross aortic valve, with wire well positioned in the LV apex.  Mitral apparatus was intact.  Valve required BAV with 20 mm True balloon X1.     Ventilation held and external pacing used for TAVR deployment under DUANE guidance.  Excellent results obtained.  Valve position verified.  Trivial perivalvular AI noted, This was uneventful.       Patient was monitored post-procedure with no echo evidence of aortic root disruption or hematoma formation. Pericardial effusion absent.    Findings:  LVSF hyperdynamic (75% LVEF) without wall motion abnormalities. LV chamber size small.  RV size was normal with preserved systolic function.   No mitral stenosis with trivial mitral regurgitatoin; normal tricuspid valve.  Pulmonic valve was not well visualized.      There was no evidence for intracardiac mass or thrombus  Aortic valve - heavily calcified, with severe reduction in cusp separation.  Calcified aortic root.    Diameters:    - LVOT = 22 mm   Pre-TAVR  - mean gradient = 61 mmHg  - peak velocity = 5 m/sec suggests critical aortic stenosis  - mild aortic insufficiency.    Post-TAVR  - mean gradient = 3 mmHg   - peak velocity = 1.6 m/sec    Adi Gallagher MD

## 2025-01-23 NOTE — OPERATIVE REPORT
[facility]    PATIENT'S NAME: CHET STEVENS   ATTENDING PHYSICIAN: Lazarus Polanco M.D.   OPERATING PHYSICIAN: Lazarus Polanco M.D.   PATIENT ACCOUNT#:   [id number]    LOCATION:  [address] [place] [address]  MEDICAL RECORD #:   [id number]       YOB: 1940  ADMISSION DATE:       01/23/2025      OPERATION DATE:  01/23/2025    OPERATIVE REPORT    PREOPERATIVE DIAGNOSIS:    POSTOPERATIVE DIAGNOSIS:    PROCEDURE PERFORMED:    1.   Balloon aortic valvuloplasty.  2.   Percutaneous transcatheter aortic valve replacement.  3.   Placement of a temporary pacemaker.    HISTORY:  The patient is an 84-year-old woman referred for an attempt at percutaneous transcatheter aortic valve replacement due to the presence of severe symptomatic calcific aortic stenosis.    PROCEDURE:  After obtaining informed consent, the patient was brought to the hybrid operating room and placed under general anesthesia by Dr. Campoverde.  Using ultrasound guidance and a micropuncture technique, a 6-Canadian sheath was placed in the right common femoral artery and a 6-Canadian sheath in the left common femoral artery.  The right-sided sheath was upsized to a 14-Canadian sheath with a preclose technique.    A pigtail catheter was placed in the ascending aorta.  The patient received heparin for systemic anticoagulation.  The SavvyWire was used for pacing and valve deployment.    Given the severity of the stenosis, a balloon valvuloplasty was performed prior to placing the valve.  We used a 20 mm True balloon.    We then placed a #23 Ryan S3 Resilia aortic valve with 1 extra mL of contrast.  The valve sat beautifully.  There was no evidence of aortic disruption or significant aortic insufficiency.    Upon placing the valve, the patient developed complete heart block with a slow idioventricular escape rhythm.  A temporary pacemaker was therefore placed via the left common femoral vein.  After approximately 10 minutes, the patient's native  conduction returned (sinus bradycardia).  She was hemodynamically stable throughout.  The temporary pacemaker was left in place, however, to assure short-term stability.    The patient tolerated the procedure quite well.  Hemostasis was achieved by deploying Perclose devices in the femoral vessels.    I discussed the procedure with the patient's son and daughter-in-law.  She was taken in good condition by Dr. Campoverde to the coronary care unit for further management.    Dr. Eber Bowers and Dr. Johann Hawkins were co-operators in this case.     Dictated By Lazarus Polanco M.D.  d: 01/23/2025 11:16:09  t: 01/23/2025 13:33:08  Job [id number]  WS/

## 2025-01-23 NOTE — PROGRESS NOTES
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 Bunn in the OR    Will leave temporary PM in for an hour or two to assure stability - if no issues will discontinue later this afternoon    Reviewed with patient's son and daughter-in-law      Elissa/Collin/Shruthi

## 2025-01-23 NOTE — ANESTHESIA PROCEDURE NOTES
Peripheral IV  Date/Time: 1/23/2025 9:49 AM  Inserted by: Antonio Campoverde MD    Placement  Needle size: 20 G  Laterality: right  Location: forearm  Site prep: alcohol  Technique: anatomical landmarks  Attempts: 1

## 2025-01-23 NOTE — H&P
History & Physical Examination    Patient Name: Erica Schwartz  MRN: [id number]  [facility]: [id number]  YOB: 1940    Diagnosis: Severe, symptomatic aortic stenosis    Present Illness: Patient presents for elective admission for TAVR today. He/She has previously been evaluate by the Structural Heart Team and Cardiovascular surgery and deemed a TAVR candidate. Please see full structural heart consult performed by Dr Polanco on 12/05/2024. PAT was performed on 1/14/2025. No changes since that visit.     Prescriptions Prior to Admission[1]  No current facility-administered medications for this encounter.       Current Outpatient Medications   Medication Instructions    ALPRAZolam (XANAX) 0.5 mg, Oral, 2 times daily PRN    amLODIPine Besy-Benazepril HCl 5-10 MG Oral Cap 1 capsule, Oral, Daily    B Complex-C-Folic Acid Oral Tab Take by mouth.    cholecalciferol (VITAMIN D3) 2,000 Units, Daily    cyanocobalamin (VITAMIN B12) 1,000 mcg, Daily    furosemide (LASIX) 40 mg, Daily    losartan (COZAAR) 25 mg, Daily    losartan (COZAAR) 50 mg, Daily    nadolol (CORGARD) 40 mg, Daily    Neomycin-Polymyxin-HC 1 % Otic Solution 4 drops, Otic, 3 times daily    sertraline (ZOLOFT) 100 mg, Oral, Daily    simvastatin (ZOCOR) 20 mg, Oral, Nightly    Vitamin C (VITAMIN C) 1,000 mg, Daily    Zinc 50 MG Oral Tab Take by mouth.        Allergies: Aspirin Allergies[2]    Past Medical History:    Anemia    Anxiety    Bilateral leg edema    COVID    no fever , just tired    Disorder of thyroid    Diverticulosis    Hearing impairment    bilateral hearing aids    Heartburn    High blood pressure    High cholesterol    History of blood transfusion    History of cardiac murmur    History of palpitations    History of stomach ulcers    HTN (hypertension)    Hyperlipidemia    Lower GI bleed    Osteoarthritis    Palpitations    Prediabetes    Tinnitus    Visual impairment    glasses    Wears glasses     Past Surgical History:   Procedure  Laterality Date    Carpal tunnel release Right     Cholecystectomy      Colonoscopy N/A 05/30/2023    Procedure: COLONOSCOPY;  Surgeon: Kevin Shane DO;  Location:  ENDOSCOPY    Hysterectomy      Knee replacement surgery      Other surgical history      PLATER WARTS    Total knee replacement Right 05/23/2023    Upper gi endoscopy,exam       No family history on file.  Social History     Tobacco Use    Smoking status: Never    Smokeless tobacco: Never   Substance Use Topics    Alcohol use: Yes     Comment: 1 glass of wine nightly       SYSTEM Check if Review is Normal Check if Physical Exam is Normal If not normal, please explain:   HEENT [ x] [ x]    NECK & BACK [ x] [ x]    HEART [ x] [ ] 2/6 raulito   LUNGS [ x] [ x]    ABDOMEN [x ] [ x]    UROGENITAL [ ] [ ] deferred   EXTREMITIES [ x] [ x]    OTHER        [ x ] I have discussed the risks and benefits and alternatives with the patient/family.  They understand and agree to proceed with plan of care.  [ x ] I have reviewed the History and Physical done within the last 30 days.  Any changes noted above.    Erika Azul, APRN  1/23/2025  7:30 AM            [1]   No medications prior to admission.   [2]   Allergies  Allergen Reactions    Aspirin BLEEDING and OTHER (SEE COMMENTS)

## 2025-01-24 ENCOUNTER — APPOINTMENT (OUTPATIENT)
Dept: CV DIAGNOSTICS | Facility: HOSPITAL | Age: 85
End: 2025-01-24
Attending: INTERNAL MEDICINE
Payer: MEDICARE

## 2025-01-24 ENCOUNTER — APPOINTMENT (OUTPATIENT)
Dept: GENERAL RADIOLOGY | Facility: HOSPITAL | Age: 85
End: 2025-01-24
Attending: INTERNAL MEDICINE
Payer: MEDICARE

## 2025-01-24 VITALS
HEIGHT: 62 IN | RESPIRATION RATE: 17 BRPM | DIASTOLIC BLOOD PRESSURE: 68 MMHG | TEMPERATURE: 98 F | OXYGEN SATURATION: 97 % | WEIGHT: 168.88 LBS | BODY MASS INDEX: 31.08 KG/M2 | HEART RATE: 66 BPM | SYSTOLIC BLOOD PRESSURE: 139 MMHG

## 2025-01-24 LAB
ANION GAP SERPL CALC-SCNC: 8 MMOL/L (ref 0–18)
ATRIAL RATE: 59 BPM
ATRIAL RATE: 60 BPM
ATRIAL RATE: 66 BPM
BUN BLD-MCNC: 16 MG/DL (ref 9–23)
CALCIUM BLD-MCNC: 8.7 MG/DL (ref 8.7–10.6)
CHLORIDE SERPL-SCNC: 106 MMOL/L (ref 98–112)
CO2 SERPL-SCNC: 27 MMOL/L (ref 21–32)
CREAT BLD-MCNC: 0.81 MG/DL
EGFRCR SERPLBLD CKD-EPI 2021: 72 ML/MIN/1.73M2 (ref 60–?)
ERYTHROCYTE [DISTWIDTH] IN BLOOD BY AUTOMATED COUNT: 14.1 %
GLUCOSE BLD-MCNC: 120 MG/DL (ref 70–99)
HCT VFR BLD AUTO: 35.9 %
HGB BLD-MCNC: 12 G/DL
INR BLD: 1.02 (ref 0.8–1.2)
MAGNESIUM SERPL-MCNC: 2.2 MG/DL (ref 1.6–2.6)
MCH RBC QN AUTO: 28.8 PG (ref 26–34)
MCHC RBC AUTO-ENTMCNC: 33.4 G/DL (ref 31–37)
MCV RBC AUTO: 86.3 FL
OSMOLALITY SERPL CALC.SUM OF ELEC: 294 MOSM/KG (ref 275–295)
P AXIS: 105 DEGREES
P AXIS: 24 DEGREES
P AXIS: 80 DEGREES
P-R INTERVAL: 222 MS
P-R INTERVAL: 230 MS
P-R INTERVAL: 240 MS
PLATELET # BLD AUTO: 151 10(3)UL (ref 150–450)
POTASSIUM SERPL-SCNC: 4.1 MMOL/L (ref 3.5–5.1)
POTASSIUM SERPL-SCNC: 4.1 MMOL/L (ref 3.5–5.1)
PROTHROMBIN TIME: 13.5 SECONDS (ref 11.6–14.8)
Q-T INTERVAL: 428 MS
Q-T INTERVAL: 520 MS
Q-T INTERVAL: 550 MS
QRS DURATION: 150 MS
QRS DURATION: 160 MS
QRS DURATION: 90 MS
QTC CALCULATION (BEZET): 448 MS
QTC CALCULATION (BEZET): 520 MS
QTC CALCULATION (BEZET): 544 MS
R AXIS: -5 DEGREES
R AXIS: -51 DEGREES
R AXIS: -56 DEGREES
RBC # BLD AUTO: 4.16 X10(6)UL
SODIUM SERPL-SCNC: 141 MMOL/L (ref 136–145)
T AXIS: 118 DEGREES
T AXIS: 119 DEGREES
T AXIS: 120 DEGREES
VENTRICULAR RATE: 59 BPM
VENTRICULAR RATE: 60 BPM
VENTRICULAR RATE: 66 BPM
WBC # BLD AUTO: 8.9 X10(3) UL (ref 4–11)

## 2025-01-24 PROCEDURE — 97165 OT EVAL LOW COMPLEX 30 MIN: CPT

## 2025-01-24 PROCEDURE — 97116 GAIT TRAINING THERAPY: CPT

## 2025-01-24 PROCEDURE — 85027 COMPLETE CBC AUTOMATED: CPT | Performed by: INTERNAL MEDICINE

## 2025-01-24 PROCEDURE — 85610 PROTHROMBIN TIME: CPT | Performed by: INTERNAL MEDICINE

## 2025-01-24 PROCEDURE — 93308 TTE F-UP OR LMTD: CPT | Performed by: INTERNAL MEDICINE

## 2025-01-24 PROCEDURE — 83735 ASSAY OF MAGNESIUM: CPT | Performed by: INTERNAL MEDICINE

## 2025-01-24 PROCEDURE — 71045 X-RAY EXAM CHEST 1 VIEW: CPT | Performed by: INTERNAL MEDICINE

## 2025-01-24 PROCEDURE — 97535 SELF CARE MNGMENT TRAINING: CPT

## 2025-01-24 PROCEDURE — 93325 DOPPLER ECHO COLOR FLOW MAPG: CPT | Performed by: INTERNAL MEDICINE

## 2025-01-24 PROCEDURE — 97161 PT EVAL LOW COMPLEX 20 MIN: CPT

## 2025-01-24 PROCEDURE — 93005 ELECTROCARDIOGRAM TRACING: CPT

## 2025-01-24 PROCEDURE — 94760 N-INVAS EAR/PLS OXIMETRY 1: CPT

## 2025-01-24 PROCEDURE — 80048 BASIC METABOLIC PNL TOTAL CA: CPT | Performed by: INTERNAL MEDICINE

## 2025-01-24 PROCEDURE — 93321 DOPPLER ECHO F-UP/LMTD STD: CPT | Performed by: INTERNAL MEDICINE

## 2025-01-24 PROCEDURE — 84132 ASSAY OF SERUM POTASSIUM: CPT | Performed by: INTERNAL MEDICINE

## 2025-01-24 PROCEDURE — 93010 ELECTROCARDIOGRAM REPORT: CPT | Performed by: INTERNAL MEDICINE

## 2025-01-24 PROCEDURE — 99211 OFF/OP EST MAY X REQ PHY/QHP: CPT

## 2025-01-24 RX ORDER — CLOPIDOGREL BISULFATE 75 MG/1
75 TABLET ORAL DAILY
Status: DISCONTINUED | OUTPATIENT
Start: 2025-01-24 | End: 2025-01-24

## 2025-01-24 RX ORDER — AMLODIPINE BESYLATE 5 MG/1
5 TABLET ORAL DAILY
Qty: 30 TABLET | Refills: 1 | Status: SHIPPED | OUTPATIENT
Start: 2025-01-25

## 2025-01-24 RX ORDER — FUROSEMIDE 10 MG/ML
40 INJECTION INTRAMUSCULAR; INTRAVENOUS ONCE
Status: COMPLETED | OUTPATIENT
Start: 2025-01-24 | End: 2025-01-24

## 2025-01-24 RX ORDER — LOSARTAN POTASSIUM 50 MG/1
50 TABLET ORAL DAILY
Qty: 30 TABLET | Refills: 1 | Status: SHIPPED | OUTPATIENT
Start: 2025-01-24

## 2025-01-24 NOTE — PROGRESS NOTES
Progress Note  Erica Schwartz Patient Status:  Inpatient    1940 MRN [id number]   Location [facility] 6NE-A Attending Lazarus Polanco MD   Hosp Day # 1 PCP Nomi Weber,      Subjective:  Sitting up in the chair. Walked this am without difficulty.     Objective:  /57   Pulse 69   Temp 98.5 °F (36.9 °C) (Temporal)   Resp 16   Ht 5' 2\" (1.575 m)   Wt 168 lb 14 oz (76.6 kg)   SpO2 95%   BMI 30.89 kg/m²     Telemetry: SR, 1st degree AVG      Intake/Output: +960    Intake/Output Summary (Last 24 hours) at 2025 1110  Last data filed at 2025 1049  Gross per 24 hour   Intake 1660 ml   Output 700 ml   Net 960 ml       Last 3 Weights   25 1155 168 lb 14 oz (76.6 kg)   25 1100 166 lb 0.1 oz (75.3 kg)   25 0826 166 lb (75.3 kg)   10/21/24 1448 167 lb 3.2 oz (75.8 kg)       Labs:  Recent Labs   Lab 25  1130 25  0420   * 120*   BUN 12 16   CREATSERUM 0.69 0.81   EGFRCR 86 72   CA 8.3* 8.7   ALB 3.5  --     141   K 3.7 4.1  4.1    106   CO2 29.0 27.0   ALKPHO 53*  --    AST 23  --    ALT 15  --    BILT 0.4  --    TP 6.4  --      Recent Labs   Lab 25  1130 25  0428   RBC 4.57 4.16   HGB 13.3 12.0   HCT 39.8 35.9   MCV 87.1 86.3   MCH 29.1 28.8   MCHC 33.4 33.4   RDW 14.0 14.1   WBC 7.1 8.9   .0* 151.0         No results for input(s): \"TROP\", \"TROPHS\", \"CK\" in the last 168 hours.  Lab Results   Component Value Date    INR 1.02 2025    INR 1.18 2025    INR 1.10 2023       Diagnostics:     Review of Systems   Constitutional: Negative.   Cardiovascular: Negative.    Respiratory: Negative.         Physical Exam:    Gen: Alert, oriented x 3, in no apparent distress  Heent: Pupils equal, reactive. Mucous membranes moist.   Cardiac: Regular rate and rhythm, normal S1,S2  Lungs: Clear to auscultation  Abd: Soft, non tender, non distended  Ext: No edema  Skin: Warm, dry  Neuro: No focal  deficits        Medications:     [Held by provider] furosemide  40 mg Oral Daily    losartan  25 mg Oral Daily    [Held by provider] metoprolol tartrate  25 mg Oral 2x Daily(Beta Blocker)    sertraline  100 mg Oral Daily    famotidine  20 mg Oral BID    Or    famotidine  20 mg Intravenous BID    amLODIPine  5 mg Oral Daily      nitroGLYCERIN in dextrose 5%      norepinephrine             Assessment:  POD # 1 TAVR 23 mm Billy S3 valve with Resilia secondary to severe aortic stenosis  Transient CHB - resolved, temp pacer removed  LBBB, resolved, still with 1st degree AVB  Allergy to ASA  HTN - 143/57  Hyperlipidemia - on statin       Plan:  Resume furosemide  Continue ARB  Allergy to ASA (bleeding ), will discuss antiplatelet plans with SH  Hold BB - will await EP review prior to discharge. Will need MCT.   Await results of echo   Hopeful discharge planning later this afternoon.     Plan of care discussed with patient, RN.    BETHANIE Knight  1/24/2025  11:10 AM    ____________________________  Pt seen and examined and discussed with the APN.    Tele: NSR, Narrow QRS. No AV block    Exam  General- awake alert  HEENT- PEERLA  Neck- JVP normal  CV- RRR CRISTINE  Lungs- CTA  Extremities- no edema  Neuro- Normal  Psych- mood/affect congruent  Skin- no lesions    I have personally performed the medical decision making in its entirety. My additions include:  Plan  - Initial post procedure ECG showed a LBBB. Now back to pre procedure narrow QRS.  - Monitor on discharge. If OK with structural team, can discharge    R3    ____________________________  Stefanie Steiner MD, FACC, RS  Cardiac Electrophysiololgy  [facility]  1/24/2025

## 2025-01-24 NOTE — PHYSICAL THERAPY NOTE
PHYSICAL THERAPY EVALUATION - INPATIENT     Room Number: 6608/6608-A  Evaluation Date: 2025  Type of Evaluation: Initial  Physician Order: PT Eval and Treat    Presenting Problem: s/p TAVR   Co-Morbidities : R TKR 2023, HTN  Reason for Therapy: Mobility Dysfunction and Discharge Planning    PHYSICAL THERAPY ASSESSMENT   Patient is a 84 year old female admitted 2025 for planned procedure.  Prior to admission, patient's baseline is indep.  Patient is currently functioning at baseline with bed mobility, transfers, and gait.  Patient is requiring supervision as a result of the following impairments: medical status.      Patient has met all skilled IPPT goals at this time. Patient will be discharged from Physical Therapy services.  Please re-order if a new functional limitation presents during this admission.      PLAN DURING HOSPITALIZATION  Nursing Mobility Recommendation : 1 Assist  PT Device Recommendation: Rolling walker              PHYSICAL THERAPY MEDICAL/SOCIAL HISTORY    HOME SITUATION  Type of Home: [facility]  Home Layout: One level                     Lives With: Alone (pet goldfish, neighbor has a cat shes goes over and pets)    Drives: Yes   Patient Regularly Uses: Rolling walker      Prior Level of [place]: Goes chair exercises 3 days a week, did have a fall in the past 6 months, tripped in mulch, does not use any assistive equipment     SUBJECTIVE  \" I don't count it as a fall\"     OBJECTIVE  Precautions: Cardiac  Fall Risk: Standard fall risk    WEIGHT BEARING RESTRICTION     PAIN ASSESSMENT  Ratin          COGNITION  Overall Cognitive Status:  WFL - within functional limits    RANGE OF MOTION AND STRENGTH ASSESSMENT  Upper extremity ROM and strength are within functional limits     Lower extremity ROM is within functional limits     Lower extremity strength is within functional limits     BALANCE  Static Sitting: Good  Dynamic Sitting: Good  Static Standing:  Good  Dynamic Standing: Good    ADDITIONAL TESTS                                    ACTIVITY TOLERANCE  Pulse:  (90-100s)  Heart Rate Source: Monitor                   O2 WALK  Oxygen Therapy  SPO2% Ambulation on Room Air: 96    NEUROLOGICAL FINDINGS                        AM-PAC '6-Clicks' INPATIENT SHORT FORM - BASIC MOBILITY  How much difficulty does the patient currently have...  Patient Difficulty: Turning over in bed (including adjusting bedclothes, sheets and blankets)?: None   Patient Difficulty: Sitting down on and standing up from a chair with arms (e.g., wheelchair, bedside commode, etc.): None   Patient Difficulty: Moving from lying on back to sitting on the side of the bed?: None   How much help from another person does the patient currently need...   Help from Another: Moving to and from a bed to a chair (including a wheelchair)?: None   Help from Another: Need to walk in hospital room?: None   Help from Another: Climbing 3-5 steps with a railing?: A Little     AM-PAC Score:  Raw Score: 23   Approx Degree of Impairment: 11.2%   Standardized Score (AM-PAC Scale): 56.93   CMS Modifier (G-Code): CI    FUNCTIONAL ABILITY STATUS  Gait Assessment   Functional Mobility/Gait Assessment  Gait Assistance: Supervision  Distance (ft): 300  Assistive Device: None  Pattern: Within Functional Limits    Skilled Therapy Provided     Bed Mobility:  Rolling: NT  Supine to sit: NT   Sit to supine: NT     Transfer Mobility:  Sit to stand: indep   Stand to sit: indep  Gait = SBA    Therapist's Comments: Discussed case with RN prior to session initiation. Pt agreeable to participation in therapy. Gait belt donned for out of bed mobility. Pt educated on post op lifting precautions and gradual activity increase back to baseline. Reports dyspnea has already improved compared to pre op but did participate in education on EC and pacing strategies. Educated on avoiding any exercises in class that would strain her incision until  fully healed and cleared by surgeon.       Exercise/Education Provided:  Energy conservation  Functional activity tolerated  Gait training  Transfer training    Patient End of Session: Up in chair;Needs met;Call light within reach;RN aware of session/findings;All patient questions and concerns addressed;Hospital anti-slip socks      Patient Evaluation Complexity Level:  History Low - no personal factors and/or co-morbidities   Examination of body systems Low -  addressing 1-2 elements   Clinical Presentation Low- Stable   Clinical Decision Making Low Complexity       PT Session Time: 20 minutes  Gait Training: 10 minutes  Therapeutic Activity: 0 minutes  Neuromuscular Re-education:  minutes  Therapeutic Exercise:  minutes

## 2025-01-24 NOTE — PLAN OF CARE
Dr Polanco recommending no antiplatelet therapy at this time and ok with discharge today from structural heart standpoint.    Dyan Beltrán PA-C

## 2025-01-24 NOTE — CARDIAC REHAB
TAVR education complete with patient. Discussed phase 2 CR, will call if she decides she wants to move forward, # provided.

## 2025-01-24 NOTE — PLAN OF CARE
Assumed care of pt at approximately 1930. NSR on tele, RA overnight. Ambulating to bathroom overnight with steady gait and no assistive devices. Bilateral groin sites are C/D/I, no hematoma. No significant events overnight.

## 2025-01-24 NOTE — OCCUPATIONAL THERAPY NOTE
OCCUPATIONAL THERAPY EVALUATION - INPATIENT    Room Number: 6608/6608-A  Evaluation Date: 1/24/2025     Type of Evaluation: Initial  Presenting Problem: TAVR 1/23    Physician Order: IP Consult to Occupational Therapy  Reason for Therapy:  ADL/IADL Dysfunction and Discharge Planning    OCCUPATIONAL THERAPY ASSESSMENT   Patient is a 84 year old female admitted on 1/23/2025 with Presenting Problem: TAVR 1/23. Co-Morbidities : R TKR 05/2023, HTN  Patient is currently functioning at baseline with  all ADL .  Prior to admission, patient's baseline is independent.  Patient met all OT goals at independent level.  Patient reports no further questions/concerns at this time.       EVALUATION SESSION:  Patient at start of session: supine    FUNCTIONAL TRANSFER ASSESSMENT  Sit to Stand: Edge of Bed  Edge of Bed: Independent    BED MOBILITY  Supine to Sit : Independent    COGNITION  Overall Cognitive Status:  WFL - within functional limits    COGNITION ASSESSMENTS     Upper Extremity:   ROM: within functional limits   Strength: is within functional limits     EDUCATION PROVIDED  Patient Education : Role of Occupational Therapy; Plan of Care; Energy Conservation; Proper Body Mechanics  Patient's Response to Education: Verbalized Understanding    Equipment used: none    Therapist comments: Educated the pt about post-TAVR activity guidelines that relate to ADL tasks. Used MD instruction sheet.  Verbalized understanding. Cueing provided for pacing. Completed pt education about energy conservation. Verbalized understanding. Independent with mobility.       Patient End of Session: Up in chair;Needs met;Call light within reach;RN aware of session/findings;All patient questions and concerns addressed    OCCUPATIONAL PROFILE    HOME SITUATION  [facility]  Home Layout: One level  Lives With: Alone (pet goldfish, neighbor has a cat shes goes over and pets)    Toilet and Equipment: Comfort height toilet;Toilet  riser  Shower/Tub and Equipment: Walk-in shower;Grab bar;Shower chair             Drives: Yes  Patient Regularly Uses: Rolling walker    Prior Level of Function: pt lives at [facility], enjoys going to workout classes, stays active. Pt has 10 goldfish.       PAIN ASSESSMENT  Ratin          OBJECTIVE  Precautions: Cardiac  Fall Risk: Standard fall risk    WEIGHT BEARING RESTRICTION       AM-PAC ‘6-Clicks’ Inpatient Daily Activity Short Form  -   Putting on and taking off regular lower body clothing?: None  -   Bathing (including washing, rinsing, drying)?: None  -   Toileting, which includes using toilet, bedpan or urinal? : None  -   Putting on and taking off regular upper body clothing?: None  -   Taking care of personal grooming such as brushing teeth?: None  -   Eating meals?: None    AM-PAC Score:  Score: 24  Approx Degree of Impairment: 0%  Standardized Score (AM-PAC Scale): 57.54    ADDITIONAL TESTS     NEUROLOGICAL FINDINGS      PLAN   Patient has been evaluated and presents with no skilled Occupational Therapy needs at this time.  Patient discharged from Occupational Therapy services.  Please re-order if a new functional limitation presents during this admission.         Patient Evaluation Complexity Level:   Occupational Profile/Medical History LOW - Brief history including review of medical or therapy records    Specific performance deficits impacting engagement in ADL/IADL LOW  1 - 3 performance deficits    Client Assessment/Performance Deficits LOW - No comorbidities nor modifications of tasks    Clinical Decision Making LOW - Analysis of occupational profile, problem-focused assessments, limited treatment options    Overall Complexity LOW     OT Session Time: 20 minutes  Self-Care Home Management: 8 minutes

## 2025-01-25 LAB
BLOOD TYPE BARCODE: 5100
UNIT VOLUME: 350 ML

## 2025-01-27 ENCOUNTER — PATIENT OUTREACH (OUTPATIENT)
Dept: CASE MANAGEMENT | Age: 85
End: 2025-01-27

## 2025-01-27 NOTE — PROGRESS NOTES
Transitions of Care Navigation  Discharge Date: 25  Contact Date: 2025    Transitions of Care Assessment:  CELESTINE Initial Assessment    General:  Assessment completed with: Patient  Patient Subjective: Spoke with patient who reports she is doing fine. The patient reports she went today to  her heart monitor. No pain. The patient relates feeling good since being home. Patient reports bilateral groin sites look like they are healing well. Patient denies drainage, erythema, warmth, or increased swelling to the groin sites. Patient denies numbness/tingling in either leg or feet. Patient denies chest pain, shortness of breath, fever, chills, nausea, vomiting, diarrhea. Patient states she has been up and moving, walking around. She reports she has been using the Incentive Spriometer. The patient denies any questions or concerns at this time.  Chief Complaint: TRANSCATHETER AORTIC VALVE REPLACEMENT FEMORAL APPROACH, 23MM JESSE  Verify patient name and  with patient/ caregiver: Yes    Hospital Stay/Discharge:  Tell me what you understand of why you were in the hospital or emergency department: Patient states she had to procedure  Prior to leaving the hospital were your Discharge Instructions reviewed with you?: Yes  Did you receive a copy of your written Discharge Instructions?: Yes  What questions do you have about your Discharge Instructions?: patient denies  Do you feel better or worse since you left the hospital or emergency department?: Better    Follow - Up Appointment:  Do you have a follow-up appointment?: Yes  Date: 25  Physician: Dr. Gallagher  Are there any barriers to getting to your follow-up appointment?: No    Home Health/DME:  Prior to leaving the hospital was Home Health (HH) arranged for you?: N/A  Are HH needs identified by staff during the assessment?: No     Prior to leaving the hospital or emergency department was Durable Medical Equipment (DME), medical supplies, or infusions  arranged for you?: Yes  Have you received your DME/supplies/infusions?: Yes  Do you have questions about using your DME/supplies/infusions?: No     Medications/Diet:  Did any of your medications change, during or after your hospital stay or ED visit?: Yes  Do you have your new or updated medications?: Yes  Do you understand what your medications are for and possible side effects?: Yes  Are there any reasons that keep you from taking your medication as prescribed?: No  Any concerns about medication refills?: No    Were you given a different diet per your Discharge Instructions?: No     Questions/Concerns:  Do you have any questions or concerns that have not been discussed?: No           Nursing Interventions:    NCM reviewed post TAVR discharge instructions with the patient.   NCM advised patient to use the Incentive Spirometer 10 times per hour while awake, if possible--If watching TV, use it at every commercial break- to keep the lungs open and prevent future related lung complications.     Upcoming appointments were reviewed    POST TAVR follow up scheduled for 01/30Follow up with Adi Gallagher MD (CARDIOLOGY) on 1/30/2025; 10:50 post TAVR follow up.  Please contact [facility] if you need to reschedule.  Follow up with Mag, Structural Heart on 3/11/2025; 12:30pm consult with Ayla KOVACS in the [facility].  Please call [phone] if you need to reschedule.  Follow up with ZOEY ALTAMIRANO on 3/11/2025; 1:30pm echo at [address] [address]/2025   Patient went today for heart monitor placement today  The patient asked me to schedule her a follow up w/ Dr. Weber. NON-TCM HFU 02/12/2025   All d/c instructions reviewed with pt.  Reviewed when to call MD vs when to go to ER/call 911.  Educated pt on the importance of taking all meds as prescribed as well as close f/u with PCP/specialists.  Pt verbalized understanding and will contact office with any further  questions or concerns.       Medications:  Medication Reconciliation:  I am aware of an inpatient discharge within the last 30 days.  The discharge medication list has been reconciled with the patient's current medication list and reviewed by me. See medication list for additions of new medication, and changes to current doses of medications and discontinued medications.  Current Outpatient Medications   Medication Sig Dispense Refill    losartan 50 MG Oral Tab Take 1 tablet (50 mg total) by mouth daily. 30 tablet 1    amLODIPine 5 MG Oral Tab Take 1 tablet (5 mg total) by mouth daily. 30 tablet 1    ALPRAZolam 0.5 MG Oral Tab Take 1 tablet (0.5 mg total) by mouth 2 (two) times daily as needed for Anxiety or Sleep. 120 tablet 0    sertraline 100 MG Oral Tab Take 1 tablet (100 mg total) by mouth daily. 90 tablet 3    furosemide 40 MG Oral Tab Take 1 tablet (40 mg total) by mouth daily.      simvastatin 20 MG Oral Tab Take 1 tablet (20 mg total) by mouth nightly. 90 tablet 0         Follow-up Appointments:  Your appointments       Date & Time Appointment Department (Center)    Mar 11, 2025 12:30 PM CDT TAVR Visit with [facility] [place] ([facility])        Jul 07, 2025 11:40 AM CDT Exam - Established with Alejandro Cardenas MD [facility] ([place], [place]) ([facility])              [facility]  [address] [address]  [phone] [facility] [address] [place], [facility] [address]  [place] [id number]  [phone]            [facility]  Was TCC Ordered: No    Primary Care Provider (If no TCC appointment)  Does patient already have a PCP appointment scheduled? No  Nurse Care Manager Scheduled PCP office HFU appointment with  patient   -If no appointment scheduled: Explain   Patient requested appointment with Dr. Weber--- appointment scheduled for 02/12     Specialist  Does the patient have any other follow-up appointment(s) need to be scheduled? No   -If yes: Nurse Care Manager reviewed upcoming specialist appointments with patient: Yes   -Does the patient need assistance scheduling appointment(s): No      Book By Date: 01/31/2025

## 2025-01-27 NOTE — PROGRESS NOTES
MARILUZ called and spoke with daughter in law Catherine briefly. Catherine states right now she is in the car on her way to  [place] to take her to her Cardiologist for her heart monitor. Catherine advised me to call back later when they are home after 1:00 and to call the patient on her home phone. MARILUZ advised will follow up at a later time.

## 2025-01-27 NOTE — PROCEDURES
Pulmonary Function Test Report  Findings:  Prebronchodilator FEV1 is 1.24L, z-score -1.40.  Prebronchodilator FVC is 1.83L, z-score -1.00.                           Postbronchodilator FEV1 is 1.27L, z-score -1.31.  Postbronchodilator FVC is 1.88L, z-score -0.88.   FEV1/ FVC ratio is 68 %, z-score -1.22.   There is no significant bronchodilator response after administration of albuterol.    The flow-volume loop demonstrates a normal pattern.  The TLC is 4.88L, z-score 0.51. The residual volume 2.99L, z-score 1.39.   The diffusion capacity is 11.03, z-score -2.30 and -1.62 when corrected for alveolar volume.     Impression:  Spirometry is normal. and There is no significant bronchodilator response, but this does not preclude treatment with bronchodilators.  Lung volumes are normal.   There is evidence of hyperinflation.   Diffusion capacity is moderate z-score (-2.51 to -4.0).  DLCO improves to normal when considering alveolar volume. This may represent a normal finding but can be seen in emphysema, interstitial lung disease, pulmonary vascular disease (such as pulmonary hypertension) and anemia. If not already performed, would suggest further evaluation as determined clinically  Normal spirometry with decreased MVV and elevated RV/TLC ratio could be suggestive of an obstructive ventilatory defect with poor effort.  Clincal correlation is advised.  Disclaimer: This PFT has been interpreted based on Z-score/LLN/ULN criteria as described in ATS guidelines 2022.   Phillip Hayes MD  [facility] Pulmonary Medicine  Office: [phone]

## 2025-02-16 DIAGNOSIS — F32.A ANXIETY AND DEPRESSION: ICD-10-CM

## 2025-02-16 DIAGNOSIS — F41.9 ANXIETY AND DEPRESSION: ICD-10-CM

## 2025-02-20 RX ORDER — ALPRAZOLAM 0.5 MG
0.5 TABLET ORAL 2 TIMES DAILY PRN
Qty: 60 TABLET | Refills: 0 | Status: SHIPPED | OUTPATIENT
Start: 2025-02-20 | End: 2025-03-28

## 2025-02-20 NOTE — TELEPHONE ENCOUNTER
Please review .protocol failed/ has no protocol    Recent fills: 12/27/2024  Last Rx written: 12/27/2024  Last Office Visit: 10/21/2024    Recent Visits  Date Type Provider Dept   10/21/24 Office Visit Nomi Weber DO [facility] [address]

## 2025-03-11 ENCOUNTER — HOSPITAL ENCOUNTER (OUTPATIENT)
Dept: CARDIOLOGY CLINIC | Facility: HOSPITAL | Age: 85
Discharge: HOME | End: 2025-03-11
Attending: INTERNAL MEDICINE
Payer: MEDICARE

## 2025-03-11 VITALS — SYSTOLIC BLOOD PRESSURE: 118 MMHG | HEART RATE: 78 BPM | OXYGEN SATURATION: 100 % | DIASTOLIC BLOOD PRESSURE: 71 MMHG

## 2025-03-11 DIAGNOSIS — Z95.2 S/P TAVR (TRANSCATHETER AORTIC VALVE REPLACEMENT): Primary | ICD-10-CM

## 2025-03-11 PROCEDURE — 99213 OFFICE O/P EST LOW 20 MIN: CPT | Performed by: NURSE PRACTITIONER

## 2025-03-11 NOTE — PROGRESS NOTES
[facility]  TAVR Clinic Note    Subjective:   Patient presents for 1 month postop TAVR APN visit.  She underwent TF TAVR with 23 mm S3 ultra Resilia valve on 1/23/2025 due to severe, symptomatic aortic stenosis.  She had transient CHB post TAVR, resolved prior to discharge.  She underwent MCT monitoring for a LBBB with first-degree AVB post TAVR-results show no pauses, predominant rhythm was sinus tach, minimum heart rate 55-max 128.  She currently denies: CP, SOB, fatigue, dizziness, palpitations, or wound issues at present.  She is very active, states she exercises at her club 3 times a week.  She notices significant improvement in her breathing and endurance level since the procedure.  She has an upcoming trip to [place] next week.      Review of Systems   Constitutional: Negative.   HENT: Negative.     Eyes: Negative.    Cardiovascular: Negative.    Respiratory: Negative.     Endocrine: Negative.    Skin: Negative.    Musculoskeletal: Negative.    Gastrointestinal: Negative.    Genitourinary: Negative.    Neurological: Negative.    Psychiatric/Behavioral: Negative.         Objective:   /71   Pulse 78   SpO2 100%   Telemetry-NSR    Physical Exam:   General: AAO, NAD  HEENT: PERRL, MMM  Neck: No JVD  Cardiac: RRR, S1, S2, no murmur or rub noted  Lungs:  CTA bilat  Abdomen: soft, NT, ND, BS +  Extremities: warm, dry, no edema noted  Neurologic: AAO x 3  Skin: groin incision CDI, well healed      Laboratory/Data:  Echo: 1/24/25:  1. Left ventricle: The cavity size was normal. Wall thickness was normal.      Systolic function was vigorous. The estimated ejection fraction was      65-70%, by visual assessment. No diagnostic evidence for regional wall      motion abnormalities. Left ventricular diastolic function parameters were      normal.   2. Right ventricle: The cavity size was normal. Systolic function was      normal.   3. Aortic valve: Billy JESSE S3 23mm (TAVR) bioprosthesis was presentand       functioning normally. There was no significant regurgitation. The peak      systolic velocity was 1.79m/sec. The mean systolic gradient was 5mm Hg.      The peak systolic gradient was 13mm Hg.   4. Pericardium, extracardiac: No significant pericardial effusion was      identified.   Impressions:  No previous study from [facility] was   available for comparison.       Labs:    Lab Results   Component Value Date     (H) 01/24/2025    BUN 16 01/24/2025    CREATSERUM 0.81 01/24/2025    ANIONGAP 8 01/24/2025    GFRAA 78 05/23/2022    GFRNAA 68 05/23/2022    CA 8.7 01/24/2025     01/24/2025    K 4.1 01/24/2025    K 4.1 01/24/2025     01/24/2025    CO2 27.0 01/24/2025    OSMOCALC 294 01/24/2025     Lab Results   Component Value Date    WBC 8.9 01/24/2025    RBC 4.16 01/24/2025    HGB 12.0 01/24/2025    HCT 35.9 01/24/2025    MCV 86.3 01/24/2025    MCH 28.8 01/24/2025    MCHC 33.4 01/24/2025    RDW 14.1 01/24/2025    .0 01/24/2025       Lab Results   Component Value Date    INR 1.02 01/24/2025    INR 1.18 01/23/2025    INR 1.10 05/29/2023       Medications:  Current Outpatient Medications   Medication Sig Dispense Refill    ALPRAZolam 0.5 MG Oral Tab Take 1 tablet (0.5 mg total) by mouth 2 (two) times daily as needed for Anxiety or Sleep. 60 tablet 0    losartan 50 MG Oral Tab Take 1 tablet (50 mg total) by mouth daily. 30 tablet 1    amLODIPine 5 MG Oral Tab Take 1 tablet (5 mg total) by mouth daily. 30 tablet 1    sertraline 100 MG Oral Tab Take 1 tablet (100 mg total) by mouth daily. 90 tablet 3    furosemide 40 MG Oral Tab Take 1 tablet (40 mg total) by mouth daily.      simvastatin 20 MG Oral Tab Take 1 tablet (20 mg total) by mouth nightly. 90 tablet 0       Assessment:  S/p TF TAVR with 23 mm S3 ultra Resilia valve 1/23/2025 secondary to severe, symptomatic aortic stenosis  HTN   Hyperlipidemia - on statin     Plan:    Complete TVT KCCQ- results reviewed   Echo today    Increase acitivity as tolerated   F/U with primary cardiologist as directed -Dr. Gallagher   F/U in TAVR clinic in  5 months with echo, or sooner if clinically indicated    I have spent 20 minutes with this patient with > 50% of my time spent in education, coordination, and counseling related to their care. We specifically discussed low NA, heart healthy diet, importance of continued daily exercise, SBE prophylaxis with antibiotics for dental/surgical procedures, compliance with medications, and f/u with specialists as directed.    Ayla Holt, APRN  3/11/2025   [id number]

## 2025-03-28 DIAGNOSIS — F32.A ANXIETY AND DEPRESSION: ICD-10-CM

## 2025-03-28 DIAGNOSIS — F41.9 ANXIETY AND DEPRESSION: ICD-10-CM

## 2025-03-28 RX ORDER — ALPRAZOLAM 0.5 MG
0.5 TABLET ORAL 2 TIMES DAILY PRN
Qty: 60 TABLET | Refills: 0 | Status: SHIPPED | OUTPATIENT
Start: 2025-03-28 | End: 2025-04-21

## 2025-03-28 NOTE — TELEPHONE ENCOUNTER
Patient's DIL, on HIPAA calling to check status on below medication. Patient is out .  Patient needs a refill for several months. High TE

## 2025-03-28 NOTE — TELEPHONE ENCOUNTER
Please review. Protocol Failed; No Protocol      Recent fills: 12/27/2024, 2/21/2025  Last Rx written: 2/20/2025  Last office visit: 10/21/2024

## 2025-04-21 DIAGNOSIS — F32.A ANXIETY AND DEPRESSION: ICD-10-CM

## 2025-04-21 DIAGNOSIS — F41.9 ANXIETY AND DEPRESSION: ICD-10-CM

## 2025-04-24 RX ORDER — ALPRAZOLAM 0.5 MG
0.5 TABLET ORAL 2 TIMES DAILY PRN
Qty: 60 TABLET | Refills: 0 | Status: SHIPPED | OUTPATIENT
Start: 2025-04-24 | End: 2025-05-16

## 2025-04-24 NOTE — TELEPHONE ENCOUNTER
Patient DIL, on HIPAA stated patient only has enough medication through Sunday.  Patient needs a refill by tomorrow, to be sure she doesn't run out.    Patient is looking for a several refills and has talked to Dr. Nomi Webre about it but isn't getting them.  High TE

## 2025-04-24 NOTE — TELEPHONE ENCOUNTER
Please review: medication fails/has no protocol attached.    Patient is asking if refills can be provided - patient states she has enough until Sunday    No future appointments with primary care medicine.    Recent fill dates: 3/29/25, 2/21/25, and 12/27/24  Date of  last written prescription: 3/28/25   Last dispensed quantity: #60 each and processed as a 30 day supply  Refill date: 4/29/25  Last office visit: 10/21/24  [x] Takes as needed  [] Takes scheduled daily

## 2025-05-16 DIAGNOSIS — F41.9 ANXIETY AND DEPRESSION: ICD-10-CM

## 2025-05-16 DIAGNOSIS — F32.A ANXIETY AND DEPRESSION: ICD-10-CM

## 2025-05-16 RX ORDER — ALPRAZOLAM 0.5 MG
0.5 TABLET ORAL 2 TIMES DAILY PRN
Qty: 60 TABLET | Refills: 0 | Status: SHIPPED | OUTPATIENT
Start: 2025-05-16 | End: 2025-06-15

## 2025-05-21 ENCOUNTER — NURSE TRIAGE (OUTPATIENT)
Dept: INTERNAL MEDICINE CLINIC | Facility: CLINIC | Age: 85
End: 2025-05-21

## 2025-05-21 NOTE — TELEPHONE ENCOUNTER
Action Requested: Summary for Provider     []  Critical Lab, Recommendations Needed  [] Need Additional Advice  []   FYI    []   Need Orders  [] Need Medications Sent to Pharmacy  []  Other     SUMMARY: Patient called to report a right earache x1 month. States she has been having issues with her ears for a while and had wax removed last year.  Denies drainage, no swelling or redness behind the ear. Denies hearing loss. States pain level is 5/10. Advised to try tylenol or motrin in the meantime for pain but she declined. States she has been placing a warm compress on the ear. Assisted with scheduling an appointment for Friday. Patient agrees with the plan.            Reason for call: Earache  Onset: x1 month    Reason for Disposition   Patient wants to be seen    Protocols used: Earache-A-OH

## 2025-05-23 ENCOUNTER — OFFICE VISIT (OUTPATIENT)
Dept: INTERNAL MEDICINE CLINIC | Facility: CLINIC | Age: 85
End: 2025-05-23
Payer: MEDICARE

## 2025-05-23 VITALS
BODY MASS INDEX: 31.82 KG/M2 | DIASTOLIC BLOOD PRESSURE: 60 MMHG | RESPIRATION RATE: 16 BRPM | WEIGHT: 166.38 LBS | OXYGEN SATURATION: 99 % | TEMPERATURE: 98 F | HEART RATE: 74 BPM | HEIGHT: 60.63 IN | SYSTOLIC BLOOD PRESSURE: 126 MMHG

## 2025-05-23 DIAGNOSIS — Z78.0 POST-MENOPAUSAL: ICD-10-CM

## 2025-05-23 DIAGNOSIS — Z13.820 OSTEOPOROSIS SCREENING: ICD-10-CM

## 2025-05-23 DIAGNOSIS — H69.91 DYSFUNCTION OF RIGHT EUSTACHIAN TUBE: Primary | ICD-10-CM

## 2025-05-23 PROCEDURE — G2211 COMPLEX E/M VISIT ADD ON: HCPCS

## 2025-05-23 PROCEDURE — 99213 OFFICE O/P EST LOW 20 MIN: CPT

## 2025-05-23 RX ORDER — LORATADINE 10 MG/1
10 TABLET ORAL DAILY
Qty: 30 TABLET | Refills: 0 | Status: SHIPPED | OUTPATIENT
Start: 2025-05-23

## 2025-05-23 RX ORDER — FLUTICASONE PROPIONATE 50 MCG
2 SPRAY, SUSPENSION (ML) NASAL DAILY
Qty: 1 EACH | Refills: 0 | Status: SHIPPED | OUTPATIENT
Start: 2025-05-23 | End: 2026-05-18

## 2025-05-23 RX ORDER — AMLODIPINE AND BENAZEPRIL HYDROCHLORIDE 5; 10 MG/1; MG/1
1 CAPSULE ORAL DAILY
COMMUNITY
Start: 2025-03-20

## 2025-05-23 NOTE — PROGRESS NOTES
Erica Schwartz is a 85 year old female.   Chief Complaint   Patient presents with    Ear Pain     ES rm- 12 - INTMT R dull ear pain for 10 mo at a 3/10.     HPI:    History of Present Illness  Erica Schwartz is an 85 year old female who presents with ear fullness and discomfort.    She experiences fullness in her left ear, described as 'stuffy', with an achy sensation and no ear drainage. There is no recent illness or allergies. A previous earwax removal incident led to bleeding and required antibiotics, but discomfort persisted. About a month ago, water exposure during showers may have contributed to her symptoms. Three weeks ago, she noticed a reddish discharge after drying her ear. She uses hearing aids and sleeps on her right side.     Allergies:  Allergies[1]   Current Meds:  Current Medications[2]     PMH:   Past Medical History[3]    ROS:   Review of Systems   Constitutional: Negative.    HENT:  Positive for congestion and ear pain.    Respiratory: Negative.     Neurological: Negative.         PHYSICAL EXAM:    /60 (BP Location: Left arm, Patient Position: Sitting, Cuff Size: large)   Pulse 74   Temp 97.8 °F (36.6 °C) (Temporal)   Resp 16   Ht 5' 0.63\" (1.54 m)   Wt 166 lb 6.4 oz (75.5 kg)   SpO2 99%   BMI 31.83 kg/m²   Physical Exam  HENT:      Right Ear: Ear canal and external ear normal. A middle ear effusion is present.      Left Ear: Hearing normal.  No middle ear effusion.      Mouth/Throat:      Mouth: Mucous membranes are moist.      Pharynx: Oropharynx is clear.   Eyes:      Conjunctiva/sclera: Conjunctivae normal.   Neurological:      Mental Status: She is oriented to person, place, and time.          ASSESSMENT/ PLAN:   Assessment & Plan  Eustachian tube dysfunction  Eustachian tube dysfunction with fluid behind tympanic membrane. No current earwax obstruction.   - Refer to ENT for hearing changes and symptoms if they fail to improve  - Prescribe Flonase nasal spray, one spray  each nostril daily.  - Prescribe Claritin, once daily.  - Advise sleeping on left side.    Osteoporosis screening  Screening placed  - XR DEXA BONE DENSITOMETRY (CPT=77080); Future    Health Maintenance Due   Topic Date Due    Zoster Vaccines (1 of 2) Never done    DEXA Scan  Never done    COVID-19 Vaccine (6 - 2024-25 season) 09/01/2024            The following individual(s) verbally consented to be recorded using ambient AI listening technology and understand that they can each withdraw their consent to this listening technology at any point by asking the clinician to turn off or pause the recording:    Patient name: Erica Schwartz    Pt indicates understanding and agrees to the plan.     Follow as needed     JENNIFER Alexander          [1]   Allergies  Allergen Reactions    Aspirin BLEEDING and OTHER (SEE COMMENTS)   [2]   Current Outpatient Medications   Medication Sig Dispense Refill    amLODIPine Besy-Benazepril HCl 5-10 MG Oral Cap Take 1 capsule by mouth daily.      loratadine 10 MG Oral Tab Take 1 tablet (10 mg total) by mouth daily. 30 tablet 0    fluticasone propionate 50 MCG/ACT Nasal Suspension 2 sprays by Each Nare route daily. 1 each 0    ALPRAZolam 0.5 MG Oral Tab Take 1 tablet (0.5 mg total) by mouth 2 (two) times daily as needed for Anxiety or Sleep. 60 tablet 0    losartan 50 MG Oral Tab Take 1 tablet (50 mg total) by mouth daily. (Patient taking differently: Take 2 tablets (100 mg total) by mouth in the morning.) 30 tablet 1    amLODIPine 5 MG Oral Tab Take 1 tablet (5 mg total) by mouth daily. 30 tablet 1    sertraline 100 MG Oral Tab Take 1 tablet (100 mg total) by mouth daily. 90 tablet 3    furosemide 40 MG Oral Tab Take 1 tablet (40 mg total) by mouth daily.      simvastatin 20 MG Oral Tab Take 1 tablet (20 mg total) by mouth nightly. 90 tablet 0   [3]   Past Medical History:   Anemia    Anxiety    Bilateral leg edema    COVID    no fever , just tired    Disorder of thyroid     Diverticulosis    Hearing impairment    bilateral hearing aids    Heartburn    High blood pressure    High cholesterol    History of blood transfusion    History of cardiac murmur    History of palpitations    History of stomach ulcers    HTN (hypertension)    Hyperlipidemia    Lower GI bleed    Osteoarthritis    Palpitations    Prediabetes    Tinnitus    Visual impairment    glasses    Wears glasses

## 2025-06-15 DIAGNOSIS — F41.9 ANXIETY AND DEPRESSION: ICD-10-CM

## 2025-06-15 DIAGNOSIS — F32.A ANXIETY AND DEPRESSION: ICD-10-CM

## 2025-06-16 NOTE — TELEPHONE ENCOUNTER
Please review: medication fails/has no protocol attached.    Patient is very early for alprazolam refill - per patient's 3 month dispense history below, patient is not due for a refill until 6/28/25    No future appointments with primary care medicine   Last office visit: 10/21/24    Recent fill dates: 5/18/25, 4/24/24, and 3/29/25  Date of  last written prescription: 5/16/25   Last dispensed quantity: #60 each and processed as a 30 day supply

## 2025-06-18 RX ORDER — ALPRAZOLAM 0.5 MG
0.5 TABLET ORAL 2 TIMES DAILY PRN
Qty: 60 TABLET | Refills: 0 | Status: SHIPPED | OUTPATIENT
Start: 2025-06-18

## 2025-06-18 NOTE — TELEPHONE ENCOUNTER
Dr. Weber- From my end patient is due for refill and this is not an early refill request. Last filled 5/18/25 for 30 day supply. Patient has only 2 tabs left and needs refill.     Attempted to call patient's home phone. Left voicemail to call back.     Called patient's alternate contact Catherine (on HIPAA). Stated she did request refill and patient is needing refill. Patient only taking 2 times daily which is how prescription is written. 1 in morning and 1 at night. Has tried to not take and she feels better with medication. Stated she has only 2 tabs left. Stated she was advised to send refill request via [employer]. Stated she is not abusing this medication or taking more than directed. Notified from my end I see she is due for a reflll, so I will let Dr. Weber know.       Outpatient Medication Detail     Disp Refills Start End    ALPRAZolam 0.5 MG Oral Tab 60 tablet 0 5/16/2025 --    Sig - Route: Take 1 tablet (0.5 mg total) by mouth 2 (two) times daily as needed for Anxiety or Sleep. - Oral    Sent to pharmacy as: ALPRAZolam 0.5 MG Oral Tablet (Xanax)    E-Prescribing Status: Receipt confirmed by pharmacy (5/16/2025  8:02 PM CDT)

## 2025-06-18 NOTE — TELEPHONE ENCOUNTER
Dr. Weber: Spoke with patient she is taking two tablets every day states she has been on this medication for 30 years.    Patient is completely out     Triage: Patient would like a call back if refill will be processed or not

## 2025-06-18 NOTE — TELEPHONE ENCOUNTER
Patient called stating she found her empty bottle of last refill of Alprazolam. Confirmed it was filled on 5/16/25 for Alprazolam 0.5 mg take two tablets daily, dispense #60. Advised patient we are awaiting reply from Dr Weber and will call her when rx is sent to pharmacy.

## 2025-06-19 NOTE — TELEPHONE ENCOUNTER
Called and spoke with Catherine (on HIPAA). Notified Catherine that Dr. Weber did send in refill of Alprazolam. Catherine stated that she will call the patient now and is also seeing her later today and will notify her that medication was refilled. Catherine wondering how there was confusion with this refill as she was sure to request refill in a timely matter so that the patient did not go without medication. Notified Catherine she did refill appropriately and should request at least 5 days before due in order for refill to be processed by our office. Catherine appreciative of call and order.

## 2025-06-19 NOTE — TELEPHONE ENCOUNTER
\"  Patient is very early for alprazolam refill - per patient's 3 month dispense history below, patient is not due for a refill until 6/28/25   \"  I was sent the above message on 6/16 which is why I was inquiring about an early refill.    \"06/16/25, patient is not due for a refill until 6/28/25 (okay to fill 6/27/25)\"  This was also the note in the pended refill.       I have sent refill.

## 2025-06-19 NOTE — TELEPHONE ENCOUNTER
Pt's daughter-in-law called, wanted to inform office that the issue with pt's Xanax prescription was that it was listed as \"as needed.\"  Pt did pickup prescription, she wanted to inform office.  RN thanked daughter-in-law for update.

## 2025-07-03 ENCOUNTER — APPOINTMENT (OUTPATIENT)
Dept: URBAN - METROPOLITAN AREA CLINIC 247 | Age: 85
Setting detail: DERMATOLOGY
End: 2025-07-03

## 2025-07-03 DIAGNOSIS — L71.8 OTHER ROSACEA: ICD-10-CM

## 2025-07-03 DIAGNOSIS — L21.8 OTHER SEBORRHEIC DERMATITIS: ICD-10-CM

## 2025-07-03 PROCEDURE — OTHER PRESCRIPTION MEDICATION MANAGEMENT: OTHER

## 2025-07-03 PROCEDURE — OTHER PRESCRIPTION: OTHER

## 2025-07-03 PROCEDURE — OTHER COUNSELING: OTHER

## 2025-07-03 PROCEDURE — OTHER MEDICATION COUNSELING: OTHER

## 2025-07-03 PROCEDURE — OTHER MIPS QUALITY: OTHER

## 2025-07-03 RX ORDER — METRONIDAZOLE 7.5 MG/G
CREAM TOPICAL
Qty: 45 | Refills: 5 | Status: ERX | COMMUNITY
Start: 2025-07-03

## 2025-07-03 RX ORDER — KETOCONAZOLE 20 MG/G
CREAM TOPICAL
Qty: 30 | Refills: 5 | Status: ERX | COMMUNITY
Start: 2025-07-03

## 2025-07-03 ASSESSMENT — LOCATION SIMPLE DESCRIPTION DERM
LOCATION SIMPLE: NOSE
LOCATION SIMPLE: INFERIOR FOREHEAD
LOCATION SIMPLE: LEFT FOREHEAD

## 2025-07-03 ASSESSMENT — LOCATION DETAILED DESCRIPTION DERM
LOCATION DETAILED: NASAL DORSUM
LOCATION DETAILED: NASAL SUPRATIP
LOCATION DETAILED: LEFT INFERIOR MEDIAL FOREHEAD
LOCATION DETAILED: INFERIOR MID FOREHEAD

## 2025-07-03 ASSESSMENT — LOCATION ZONE DERM
LOCATION ZONE: NOSE
LOCATION ZONE: FACE

## 2025-07-22 ENCOUNTER — HOSPITAL ENCOUNTER (OUTPATIENT)
Dept: CARDIOLOGY CLINIC | Facility: HOSPITAL | Age: 85
Discharge: HOME | End: 2025-07-22
Attending: INTERNAL MEDICINE
Payer: MEDICARE

## 2025-07-22 VITALS
HEART RATE: 81 BPM | DIASTOLIC BLOOD PRESSURE: 73 MMHG | OXYGEN SATURATION: 100 % | RESPIRATION RATE: 12 BRPM | SYSTOLIC BLOOD PRESSURE: 126 MMHG

## 2025-07-22 PROCEDURE — 99212 OFFICE O/P EST SF 10 MIN: CPT

## 2025-07-22 NOTE — PROGRESS NOTES
[facility]  TAVR Clinic Note    Subjective:   Patient presents for 6 month postop TAVR APN visit.   She  underwent  TF TAVR with 23mm S3 Ultra Resila Valve on 1/23/25 d/t severe, symptomatic aortic stenosis.  Since our last visit in March, she continues to do well from a cardiac standpoint.  She is very active in her community, exercises at her club 3 times weekly without symptoms.  She currently denies: CP, SOB, fatigue, dizziness, palpitations, or wound issues at present.      Review of Systems   Constitutional: Negative.   HENT: Negative.     Eyes: Negative.    Cardiovascular: Negative.    Respiratory: Negative.     Endocrine: Negative.    Skin: Negative.    Musculoskeletal: Negative.    Gastrointestinal: Negative.    Genitourinary: Negative.    Neurological: Negative.    Psychiatric/Behavioral: Negative.       Objective:   There were no vitals taken for this visit.  Tele-NSR    Physical Exam:   General: AAO, NAD  HEENT: PERRL, MMM  Neck: No JVD  Cardiac: RRR, S1, S2, no murmur or rub noted  Lungs:  CTA bilat  Abdomen: soft, NT, ND, BS +  Extremities: warm, dry, no edema noted  Neurologic: AAO x 3  Skin: groin incision CDI, well healed    Laboratory/Data:  Echo: 3/10/2025  EF 65 to 70%, no RWMA noted  RV systolic function normal, TAPSE 2.4  PASP 15, RA 3  TAVR valve functioning normally, peak velocity 2.4M/S, mean grad 11  No effusion noted    Labs:    Lab Results   Component Value Date     (H) 01/24/2025    BUN 16 01/24/2025    CREATSERUM 0.81 01/24/2025    ANIONGAP 8 01/24/2025    GFRAA 78 05/23/2022    GFRNAA 68 05/23/2022    CA 8.7 01/24/2025     01/24/2025    K 4.1 01/24/2025    K 4.1 01/24/2025     01/24/2025    CO2 27.0 01/24/2025    OSMOCALC 294 01/24/2025     Lab Results   Component Value Date    WBC 8.9 01/24/2025    RBC 4.16 01/24/2025    HGB 12.0 01/24/2025    HCT 35.9 01/24/2025    MCV 86.3 01/24/2025    MCH 28.8 01/24/2025    MCHC 33.4 01/24/2025    RDW 14.1 01/24/2025    PLT  151.0 01/24/2025       Lab Results   Component Value Date    INR 1.02 01/24/2025    INR 1.18 01/23/2025    INR 1.10 05/29/2023       Medications:  Current Medications[1]    Assessment:  S/p TF TAVR with 23 mm S3 ultra Resilia valve 1/23/2025 secondary to severe, symptomatic aortic stenosis  HTN   Hyperlipidemia - on statin     Plan:    Echo today   F/U with primary cardiologist as directed - Dr. Gallagher   F/U in TAVR clinic in 6 months with echo, or sooner if clinically indicated    I have spent 20 minutes with this patient with > 50% of my time spent in education, coordination, and counseling related to their care. We specifically discussed low NA, heart healthy diet, importance of continued daily exercise, SBE prophylaxis with antibiotics for dental/surgical procedures, compliance with medications, and f/u with specialists as directed.    Ayla Holt, APRN  7/22/2025   1420           [1]   Current Outpatient Medications   Medication Sig Dispense Refill    ALPRAZolam 0.5 MG Oral Tab Take 1 tablet (0.5 mg total) by mouth 2 (two) times daily as needed for Anxiety or Sleep. 60 tablet 0    amLODIPine Besy-Benazepril HCl 5-10 MG Oral Cap Take 1 capsule by mouth daily.      loratadine 10 MG Oral Tab Take 1 tablet (10 mg total) by mouth daily. 30 tablet 0    fluticasone propionate 50 MCG/ACT Nasal Suspension 2 sprays by Each Nare route daily. 1 each 0    losartan 50 MG Oral Tab Take 1 tablet (50 mg total) by mouth daily. (Patient taking differently: Take 2 tablets (100 mg total) by mouth in the morning.) 30 tablet 1    amLODIPine 5 MG Oral Tab Take 1 tablet (5 mg total) by mouth daily. 30 tablet 1    sertraline 100 MG Oral Tab Take 1 tablet (100 mg total) by mouth daily. 90 tablet 3    furosemide 40 MG Oral Tab Take 1 tablet (40 mg total) by mouth daily.      simvastatin 20 MG Oral Tab Take 1 tablet (20 mg total) by mouth nightly. 90 tablet 0

## 2025-07-29 ENCOUNTER — HOSPITAL ENCOUNTER (OUTPATIENT)
Dept: CT IMAGING | Age: 85
Discharge: HOME | End: 2025-07-29
Attending: INTERNAL MEDICINE

## 2025-07-29 DIAGNOSIS — R91.8 MULTIPLE PULMONARY NODULES: ICD-10-CM

## 2025-07-29 PROCEDURE — 71250 CT THORAX DX C-: CPT | Performed by: INTERNAL MEDICINE

## 2025-07-31 ENCOUNTER — TELEPHONE (OUTPATIENT)
Dept: INTERNAL MEDICINE CLINIC | Facility: CLINIC | Age: 85
End: 2025-07-31

## 2025-07-31 DIAGNOSIS — R73.03 PREDIABETES: ICD-10-CM

## 2025-07-31 DIAGNOSIS — E78.2 MIXED HYPERLIPIDEMIA: Primary | ICD-10-CM

## 2025-07-31 DIAGNOSIS — Z13.29 SCREENING FOR THYROID DISORDER: ICD-10-CM

## 2025-08-04 ENCOUNTER — OFFICE VISIT (OUTPATIENT)
Facility: CLINIC | Age: 85
End: 2025-08-04

## 2025-08-04 VITALS
RESPIRATION RATE: 16 BRPM | DIASTOLIC BLOOD PRESSURE: 68 MMHG | HEART RATE: 82 BPM | OXYGEN SATURATION: 94 % | BODY MASS INDEX: 32.39 KG/M2 | WEIGHT: 165 LBS | SYSTOLIC BLOOD PRESSURE: 114 MMHG | HEIGHT: 60 IN

## 2025-08-04 DIAGNOSIS — R91.8 MULTIPLE PULMONARY NODULES: Primary | ICD-10-CM

## 2025-08-04 DIAGNOSIS — R06.09 DOE (DYSPNEA ON EXERTION): ICD-10-CM

## 2025-08-04 PROCEDURE — 99214 OFFICE O/P EST MOD 30 MIN: CPT

## 2025-08-14 DIAGNOSIS — F41.9 ANXIETY AND DEPRESSION: ICD-10-CM

## 2025-08-14 DIAGNOSIS — F32.A ANXIETY AND DEPRESSION: ICD-10-CM

## 2025-08-14 RX ORDER — ALPRAZOLAM 0.5 MG
0.5 TABLET ORAL 2 TIMES DAILY PRN
Qty: 60 TABLET | Refills: 0 | Status: CANCELLED | OUTPATIENT
Start: 2025-08-14

## 2025-08-15 RX ORDER — ALPRAZOLAM 0.5 MG
0.5 TABLET ORAL 2 TIMES DAILY PRN
Qty: 14 TABLET | Refills: 0 | Status: SHIPPED | OUTPATIENT
Start: 2025-08-15

## 2025-08-28 ENCOUNTER — APPOINTMENT (OUTPATIENT)
Dept: URBAN - METROPOLITAN AREA CLINIC 247 | Age: 85
Setting detail: DERMATOLOGY
End: 2025-08-28

## 2025-08-28 DIAGNOSIS — L71.8 OTHER ROSACEA: ICD-10-CM

## 2025-08-28 DIAGNOSIS — L21.8 OTHER SEBORRHEIC DERMATITIS: ICD-10-CM

## 2025-08-28 PROCEDURE — OTHER PRESCRIPTION MEDICATION MANAGEMENT: OTHER

## 2025-08-28 PROCEDURE — OTHER MIPS QUALITY: OTHER

## 2025-08-28 PROCEDURE — OTHER COUNSELING: OTHER

## 2025-08-28 PROCEDURE — OTHER PRESCRIPTION: OTHER

## 2025-08-28 PROCEDURE — 99214 OFFICE O/P EST MOD 30 MIN: CPT

## 2025-08-28 PROCEDURE — OTHER MEDICATION COUNSELING: OTHER

## 2025-08-28 RX ORDER — KETOCONAZOLE 20 MG/G
CREAM TOPICAL
Qty: 30 | Refills: 5 | Status: CANCELLED
Stop reason: SDUPTHER

## 2025-08-28 RX ORDER — METRONIDAZOLE 7.5 MG/G
CREAM TOPICAL
Qty: 45 | Refills: 5 | Status: CANCELLED
Stop reason: SDUPTHER

## 2025-08-28 ASSESSMENT — LOCATION SIMPLE DESCRIPTION DERM
LOCATION SIMPLE: INFERIOR FOREHEAD
LOCATION SIMPLE: LEFT FOREHEAD
LOCATION SIMPLE: NOSE

## 2025-08-28 ASSESSMENT — LOCATION DETAILED DESCRIPTION DERM
LOCATION DETAILED: NASAL DORSUM
LOCATION DETAILED: LEFT INFERIOR MEDIAL FOREHEAD
LOCATION DETAILED: NASAL SUPRATIP
LOCATION DETAILED: INFERIOR MID FOREHEAD

## 2025-08-28 ASSESSMENT — LOCATION ZONE DERM
LOCATION ZONE: FACE
LOCATION ZONE: NOSE

## (undated) DEVICE — DISPOSABLE TOURNIQUET CUFF SINGLE BLADDER, DUAL PORT AND QUICK CONNECT CONNECTOR: Brand: COLOR CUFF

## (undated) DEVICE — 3M™ RED DOT™ MONITORING ELECTRODE WITH FOAM TAPE AND STICKY GEL, 50/BAG, 20/CASE, 72/PLT 2570: Brand: RED DOT™

## (undated) DEVICE — BLOCK BITE MAXI 60FR

## (undated) DEVICE — STERILE POLYISOPRENE POWDER-FREE SURGICAL GLOVES: Brand: PROTEXIS

## (undated) DEVICE — SUT ETHIBOND 5 V-37 MB66G

## (undated) DEVICE — LAPAROTOMY SPONGE - RF AND X-RAY DETECTABLE PRE-WASHED: Brand: SITUATE

## (undated) DEVICE — NEEDLE BIOPSY ACQUIRE OD22 GA

## (undated) DEVICE — 3M™ IOBAN™ 2 ANTIMICROBIAL INCISE DRAPE 6650EZ: Brand: IOBAN™ 2

## (undated) DEVICE — HOOK LOCK LATEX FREE ELASTIC BANDAGE 2INX5YD

## (undated) DEVICE — BITEBLOCK ENDOSCP 60FR MAXI STRP

## (undated) DEVICE — CATHETER PACE 5FR L110CM INTRO 6FR ELECTRD

## (undated) DEVICE — 1200CC GUARDIAN II: Brand: GUARDIAN

## (undated) DEVICE — PADDING CAST COTTON  4

## (undated) DEVICE — ENDOSCOPY PACK - LOWER: Brand: MEDLINE INDUSTRIES, INC.

## (undated) DEVICE — REM POLYHESIVE ADULT PATIENT RETURN ELECTRODE: Brand: VALLEYLAB

## (undated) DEVICE — Device: Brand: STABLECUT®

## (undated) DEVICE — SCREWS PACK: Brand: KNEE INSTRUMENTS

## (undated) DEVICE — SYRINGE 30ML LL TIP

## (undated) DEVICE — BALLOON HEMOSTATIC EUS LINEAR

## (undated) DEVICE — TIP CLEANER: Brand: VALLEYLAB

## (undated) DEVICE — THREADED PINS PACK: Brand: KNEE INSTRUMENTS

## (undated) DEVICE — 3M™ TEGADERM™ TRANSPARENT FILM DRESSING FRAME STYLE, 1626W, 4 IN X 4-3/4 IN (10 CM X 12 CM), 50/CT 4CT/CASE: Brand: 3M™ TEGADERM™

## (undated) DEVICE — ELECTRODE DEFIB AD SLD GEL MULTFUNC RADLUC

## (undated) DEVICE — UPPER EXTREMITY CDS-LF: Brand: MEDLINE INDUSTRIES, INC.

## (undated) DEVICE — SWORD PIN PACK: Brand: KNEE INSTRUMENTS

## (undated) DEVICE — ISOPROPYL ALCOHOL 70% 4OZ BTL

## (undated) DEVICE — ALCOHOL 70% 4 OZ

## (undated) DEVICE — FIXED CORE WIRE GUIDE SAFE-T-J, CURVED: Brand: COOK

## (undated) DEVICE — BNDG COHESIVE W4INXL5YD TAN E

## (undated) DEVICE — TRUE™ DILATATION BALLOON VALVULOPLASTY CATHETER, 20 MM X 4.5 CM, 110 CM CATHETER: Brand: TRUE DILATATION

## (undated) DEVICE — PACK UNIVERSAL DRAPE

## (undated) DEVICE — TIBURON DRAPE TOWELS: Brand: CONVERTORS

## (undated) DEVICE — PINNACLE INTRODUCER SHEATH: Brand: PINNACLE

## (undated) DEVICE — SOLUTION  .9 3000ML

## (undated) DEVICE — GMK SPHERE KNEE: Type: IMPLANTABLE DEVICE

## (undated) DEVICE — DRESS WOUND AQUACEL 3.5INX12IN

## (undated) DEVICE — SOLUTION  .9 1000ML BTL

## (undated) DEVICE — PERCLOSE PROGLIDE™ SUTURE-MEDIATED CLOSURE SYSTEM: Brand: PERCLOSE PROGLIDE™

## (undated) DEVICE — KIT MAINFOLD 3 PORT NAMIC CUST

## (undated) DEVICE — WIRESAVVY X-SMALL 2.9 X 3.2CM

## (undated) DEVICE — PREMIERPRO LAP SPONGE 18"X18" STERILE, 5/PK: Brand: PREMIERPRO

## (undated) DEVICE — 3M™ IOBAN™ 2 ANTIMICROBIAL INCISE DRAPE 6651EZ: Brand: IOBAN™ 2

## (undated) DEVICE — SUT PERMA- 0 18IN FSL NABSRB BLK L30MM 3/8

## (undated) DEVICE — 10FT COMBINED O2 DELIVERY/CO2 MONITORING. FILTER WITH MICROSTREAM TYPE LUER: Brand: DUAL ADULT NASAL CANNULA

## (undated) DEVICE — ANGIOGRAPHIC CATHETER: Brand: EXPO™

## (undated) DEVICE — HOOD, PEEL-AWAY: Brand: FLYTE

## (undated) DEVICE — TOWEL SURG OR 17X30IN BLUE

## (undated) DEVICE — 12 FOOT DISPOSABLE EXTENSION CABLE WITH SAFE CONNECT / ALLIGATOR CLIP

## (undated) DEVICE — SOL PREP 4OZ 10% POVIDONE IOD

## (undated) DEVICE — DRAPE,U/SHT,SPLIT,FILM,60X84,STERILE: Brand: MEDLINE

## (undated) DEVICE — SOLUTION PREP 26ML 0.7% POVACRYLEX 74% ISO

## (undated) DEVICE — BIOGUARD CLEANING ADAPTER

## (undated) DEVICE — CLIP HEMOSTASIS LOCKADO 16X235

## (undated) DEVICE — GAUZE SPONGES,12 PLY: Brand: CURITY

## (undated) DEVICE — SPONGE 4X4 10PK

## (undated) DEVICE — ANGIOGRAPHIC CATHETER: Brand: IMPULSE™

## (undated) DEVICE — MINI-BLADE®: Brand: BEAVER®

## (undated) DEVICE — NDL DISP INJ ACUJECT FLEX

## (undated) DEVICE — 3M™ BAIR HUGGER® UNDERBODY BLANKET, FULL ACCESS, 10 PER CASE 63500: Brand: BAIR HUGGER™

## (undated) DEVICE — SUT MONOCRYL 4-0 PS-2 Y496G

## (undated) DEVICE — SUT VICRYL 2-0 CP-1 J266H

## (undated) DEVICE — STERIS KITS

## (undated) DEVICE — NEEDLE SPINAL 18X3-1/2 PINK.

## (undated) DEVICE — Device

## (undated) DEVICE — PREMIUM WET SKIN PREP TRAY: Brand: MEDLINE INDUSTRIES, INC.

## (undated) DEVICE — WRAP COOLING KNEE W/ICE PILLOW

## (undated) DEVICE — DISPOSABLE BIPOLAR FORCEPS 4" (10.2CM) JEWELERS, STRAIGHT 0.4MM TIP AND 12 FT. (3.6M) CABLE: Brand: KIRWAN

## (undated) DEVICE — GOWN,SIRUS,FABRIC-REINFORCED,X-LARGE: Brand: MEDLINE

## (undated) DEVICE — ENDOSCOPIC ULTRASOUND FINE NEEDLE BIOPSY (FNB) DEVICE: Brand: ACQUIRE

## (undated) DEVICE — DRAPE SURG 18X24

## (undated) DEVICE — COVER,TABLE,44X90,STERILE: Brand: MEDLINE

## (undated) DEVICE — STERILE POLYISOPRENE POWDER-FREE SURGICAL GLOVES WITH EMOLLIENT COATING: Brand: PROTEXIS

## (undated) DEVICE — VALVE KIT SGL USE DEFENDO

## (undated) DEVICE — GUIDEWIRE: Brand: AMPLATZ SUPER STIFF™

## (undated) DEVICE — OR TOWEL, 17" X 26" STERILE, BLUE: Brand: PREMIERPRO

## (undated) DEVICE — SPECIMAN CONTAINER 4OZ STERILE

## (undated) DEVICE — WIRE J .035X260

## (undated) DEVICE — PERCLOSE™ PROSTYLE™ SUTURE-MEDIATED CLOSURE AND REPAIR SYSTEM: Brand: PERCLOSE™ PROSTYLE™

## (undated) DEVICE — KIT ENDO ORCAPOD 160/180/190

## (undated) DEVICE — SYRINGE MED 30ML STD CLR PLAS LL TIP N CTRL

## (undated) DEVICE — SURGICAL SCRB HIBICLENS 4OZ

## (undated) DEVICE — HOOD: Brand: FLYTE

## (undated) DEVICE — LIGHT HANDLE

## (undated) DEVICE — X-RAY DETECTABLE SPONGES,16 PLY: Brand: VISTEC

## (undated) DEVICE — E-Z BUTTON SWITCH PENCIL

## (undated) DEVICE — TOTAL HIP CDS: Brand: MEDLINE INDUSTRIES, INC.

## (undated) DEVICE — GOWN AERO CHROME XXL

## (undated) DEVICE — 1010 S-DRAPE TOWEL DRAPE 10/BX: Brand: STERI-DRAPE™

## (undated) DEVICE — TUBING MEGADYNE SPECULUM

## (undated) DEVICE — NON-ADHERENT STRIPS,OIL EMULSION: Brand: CURITY

## (undated) DEVICE — BOWL CEMENT MIX QUICK-VAC

## (undated) DEVICE — SMOOTH PINS PACK: Brand: KNEE INSTRUMENTS

## (undated) DEVICE — SHORT THREADED PINS PACK: Brand: KNEE INSTRUMENTS

## (undated) DEVICE — WIRE NAMIC STR 035X150

## (undated) DEVICE — PADDING CAST COTTON STER 3

## (undated) DEVICE — SLEEVE KENDALL SCD EXPRESS MED

## (undated) DEVICE — PACK ANGIOGRAPHY CUSTOM

## (undated) DEVICE — FORCEP BIOPSY RJ4 LG CAP W/ND

## (undated) NOTE — Clinical Note
Initial assessment completed with patient. Appointment scheduled with Cardiology on 01/30/2025. Follow up appointment scheduled with you for 02/12/2025. No further outreach needed at this time.  Thank you!

## (undated) NOTE — LETTER
Date: 10/20/2023    Patient Name: Trinity Hinson          To Whom it may concern:    Secondary to this patient's medical history and subsequent fall risk, I recommend accommodations during airport travel, such as use of a shuttle. Thank you for facilitating my care of this patient.     Sincerely,      Berwyn Dandy, MD

## (undated) NOTE — LETTER
OUTSIDE TESTING RESULT REQUEST     IMPORTANT: FOR YOUR IMMEDIATE ATTENTION  Please FAX all test results listed below to: [phone]     Testing already done on or about: Appointment with you 2023     * * * * If testing is NOT complete, arrange with patient A.S.A.P. * * * *      Patient Name: Mela Golden  Surgery Date: 2023  Medical Record: [id number]  CSN: [id number]  : 1940 - A: 80 y     Sex: female  Surgeon(s):  James Herrera MD  Procedure: RIGHT TOTAL KNEE ARTHROPLASTY. Anesthesia Type: Choice     Surgeon: James Herrera MD     The following Testing and Time Line are REQUIRED PER ANESTHESIA     EKG READ AND SIGNED WITHIN   90 days      Thank You,   Sent by: JOSE R Mendoza RN

## (undated) NOTE — LETTER
23  [facility] Orthopedic Surgery   Pre-Operative Clearance Request    Patient Name:   Aniyah Perez             :   1940    Surgeon: Dr. Kelly Gallagher             Date of Surgery: 23     Surgical Procedure: RIGHT TOTAL KNEE ARTHROPLASTY. Please complete all of the following 2-3 weeks PRIOR TO your scheduled surgery to avoid potential cancellation. [x]  History and Physical       [x]  Medical  Clearance                    Required pre-op testing to be ordered:                        [x]  CBC W/Diff                                                                   [x]  CMP                                                                                                                                                                                                                         [x]  PT/INR    [x]  PTT     [x]  Type and Screen                     **Please fax test results, H&P, and clearance to [phone] and to                                         P. A.T at [phone]**

## (undated) NOTE — LETTER
OUTSIDE TESTING RESULT REQUEST     IMPORTANT: FOR YOUR IMMEDIATE ATTENTION  Please FAX all test results listed below to: [phone]     Testing already done on or about: Appointment with you 2023     * * * * If testing is NOT complete, arrange with patient A.S.A.P. * * * *      Patient Name: Gretta Level  Surgery Date: 2023  Medical Record: [id number]  CSN: [id number]  : 1940 - A: 80 y     Sex: female  Surgeon(s):  Merrill Scott MD  Procedure: RIGHT TOTAL KNEE ARTHROPLASTY. Anesthesia Type: Choice     Surgeon: Merrill Scott MD     The following Testing and Time Line are REQUIRED PER ANESTHESIA     EKG READ AND SIGNED WITHIN   90 days      Thank You,   Sent by: JOSE R Mendoza RN

## (undated) NOTE — LETTER
[address] [place] [address]   Date:   6/8/2022     Name:   Esperanza Breaux    YOB: 1940   MRN:   [id number]       [facility]? Wade the areas on your body where you feel the described sensations. Use the appropriate symbol. Tara Azar the areas of radiation. Include all affected areas. Just to complete the picture, please draw in the face. ACHE:  ^ ^ ^   NUMBNESS:  0000   PINS & NEEDLES:  = = = =                              ^ ^ ^                       0000              = = = =                                    ^ ^ ^                       0000            = = = =      BURNING:  XXXX   STABBING: ////                  XXXX                ////                         XXXX          ////     Please wade the line below indicating your degree of pain right now  with 0 being no pain 10 being the worst pain possible.                                          0             1             2              3             4              5              6              7             8             9             10         Patient Signature:

## (undated) NOTE — LETTER
Patient Name: Ramos MAJANO-Age / Sex: 1940-A: 80 y  female  Medical Records: [id number] CSN: [id number]    The above patient had a positive COVID test on: approximately 2023___________      Per [facility] Infection Control guidelines this patient will NOT be retested for COVID  prior to their surgery/procedure on: _______2023_______. Thank you.